# Patient Record
Sex: MALE | Race: WHITE | Employment: PART TIME | URBAN - METROPOLITAN AREA
[De-identification: names, ages, dates, MRNs, and addresses within clinical notes are randomized per-mention and may not be internally consistent; named-entity substitution may affect disease eponyms.]

---

## 2017-02-03 ENCOUNTER — TRANSFERRED RECORDS (OUTPATIENT)
Dept: HEALTH INFORMATION MANAGEMENT | Facility: CLINIC | Age: 27
End: 2017-02-03

## 2017-02-13 ENCOUNTER — OFFICE VISIT (OUTPATIENT)
Dept: OTHER | Facility: OUTPATIENT CENTER | Age: 27
End: 2017-02-13

## 2017-02-13 DIAGNOSIS — F43.10 PTSD (POST-TRAUMATIC STRESS DISORDER): ICD-10-CM

## 2017-02-13 DIAGNOSIS — F64.0 GENDER DYSPHORIA IN ADOLESCENT AND ADULT: Primary | ICD-10-CM

## 2017-02-13 NOTE — MR AVS SNAPSHOT
After Visit Summary   2/13/2017    Parveen Kaplan    MRN: [id number]           Patient Information     Date Of Birth          1990        Visit Information        Provider Department      2/13/2017 6:00 PM Denny Hdez PhD  [facility]        Today's Diagnoses     Gender dysphoria in adolescent and adult    -  1    PTSD (post-traumatic stress disorder)           Follow-ups after your visit        Your next 10 appointments already scheduled     Feb 27, 2017  6:00 PM CST   INDIVIDUAL THERAPY with Denny Hdez PhD LP   [facility] ([facility])    [address]  [address]  [facility] [address]   [phone]            Mar 13, 2017  6:00 PM CDT   INDIVIDUAL THERAPY with Denny Hdez PhD LP   [facility] ([facility])    [address]  [address]  [facility] [address]   [phone]            Mar 27, 2017  6:00 PM CDT   INDIVIDUAL THERAPY with Denny Hdez PhD LP   [facility] [facility] ([facility])    [address]  [address]  [facility] [address]   [phone]            Apr 10, 2017  6:00 PM CDT   INDIVIDUAL THERAPY with Denny Hdez PhD LP   [facility] ([facility])    [address]  [address]  [facility] [address]   [phone]            Apr 24, 2017  6:00 PM CDT   INDIVIDUAL THERAPY with Denny Hdez PhD LP   [facility] ([facility])    [address]  [address]  [facility] [address]   [phone]              Who to contact     Please call your clinic at [phone] to:    Ask questions about your health    Make or cancel appointments    Discuss your medicines    Learn about your test results    Speak to your doctor   If you have compliments or concerns about an experience at your clinic, or if you wish to file a complaint, please contact [facility] Patient  Relations at [phone] or email us at [email or web address]         Additional Information About Your Visit        [email or web address] Information     HerBabyShower gives you secure access to your electronic health record. If you see a primary care provider, you can also send messages to your care team and make appointments. If you have questions, please call your primary care clinic.  If you do not have a primary care provider, please call [phone] and they will assist you.      HerBabyShower is an electronic gateway that provides easy, online access to your medical records. With HerBabyShower, you can request a clinic appointment, read your test results, renew a prescription or communicate with your care team.     To access your existing account, please contact your [facility] or call [phone] for assistance.        Care EveryWhere ID     This is your Care EveryWhere ID. This could be used by other organizations to access your [place] medical records  [id number]         Blood Pressure from Last 3 Encounters:   12/06/16 110/60   11/30/16 121/60   11/29/16 124/73    Weight from Last 3 Encounters:   12/06/16 85.4 kg (188 lb 3.2 oz)   11/30/16 85.7 kg (188 lb 15 oz)   11/29/16 85.5 kg (188 lb 9.6 oz)              We Performed the Following     Individual Psychotherapy (53+ min) [65654]          Today's Medication Changes          These changes are accurate as of: 2/13/17  6:56 PM.  If you have any questions, ask your nurse or doctor.               These medicines have changed or have updated prescriptions.        Dose/Directions    gabapentin 300 MG capsule   Commonly known as:  NEURONTIN   This may have changed:    - when to take this  - additional instructions   Used for:  Depression with anxiety        One tid   Quantity:  90 capsule   Refills:  1       venlafaxine 225 MG Tb24 24 hr tablet   Commonly known as:  EFFEXOR-ER   This may have changed:    - how much to take  - additional  "instructions   Used for:  Depression with anxiety, Panic attacks        One q day   Quantity:  30 tablet   Refills:  1                Primary Care Provider    Md Other Clinic                Thank you!     Thank you for choosing [facility]  for your care. Our goal is always to provide you with excellent care. Hearing back from our patients is one way we can continue to improve our services. Please take a few minutes to complete the written survey that you may receive in the mail after your visit with us. Thank you!             Your Updated Medication List - Protect others around you: Learn how to safely use, store and throw away your medicines at www.disposemymeds.org.          This list is accurate as of: 2/13/17  6:56 PM.  Always use your most recent med list.                   Brand Name Dispense Instructions for use    BUSPIRONE HCL PO      Take 15 mg by mouth 3 times daily       gabapentin 300 MG capsule    NEURONTIN    90 capsule    One tid       hydrOXYzine 25 MG capsule    VISTARIL    30 capsule    Take 1 capsule (25 mg) by mouth every 6 hours as needed for itching       lamoTRIgine 50 MG half-tab    LaMICtal     Take 25 mg by mouth 2 times daily       LORazepam 1 MG tablet    ATIVAN    60 tablet    Take 0.5 tablets (0.5 mg) by mouth every 8 hours as needed for anxiety       MIRTAZAPINE PO      Take 25 mg by mouth At Bedtime       ondansetron 4 MG ODT tab    ZOFRAN-ODT    8 tablet    Take 1 tablet (4 mg) by mouth every 6 hours as needed for nausea       oxyCODONE 5 MG IR tablet    ROXICODONE    40 tablet    Take 1-2 tablets (5-10 mg) by mouth every 3 hours as needed for other (Moderate to Severe Pain)       senna-docusate 8.6-50 MG per tablet    SENOKOT-S;PERICOLACE    30 tablet    Take 1-2 tablets by mouth 2 times daily       syringe/needle (disp) 25G X 1\" 1 ML Misc     10 each    To use with weekly IM injection       testosterone cypionate 200 MG/ML injection    DEPO-TESTOSTERONE    2 mL    " Inject 0.25 mLs (50 mg) into the muscle once a week       venlafaxine 225 MG Tb24 24 hr tablet    EFFEXOR-ER    30 tablet    One q day       WELLBUTRIN XL PO      Take 150 mg by mouth
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
## 2017-02-27 ENCOUNTER — OFFICE VISIT (OUTPATIENT)
Dept: OTHER | Facility: OUTPATIENT CENTER | Age: 27
End: 2017-02-27

## 2017-02-27 DIAGNOSIS — F64.0 GENDER DYSPHORIA IN ADOLESCENT AND ADULT: Primary | ICD-10-CM

## 2017-02-27 DIAGNOSIS — F43.10 PTSD (POST-TRAUMATIC STRESS DISORDER): ICD-10-CM

## 2017-02-27 NOTE — MR AVS SNAPSHOT
After Visit Summary   2/27/2017    Parveen Kaplan    MRN: [id number]           Patient Information     Date Of Birth          1990        Visit Information        Provider Department      2/27/2017 6:00 PM Denny Hdez PhD LP [facility]        Today's Diagnoses     Gender dysphoria in adolescent and adult    -  1    PTSD (post-traumatic stress disorder)           Follow-ups after your visit        Your next 10 appointments already scheduled     Mar 02, 2017  2:00 PM CST   DBT Eval with Deneen Trevino LP   Psychiatry Clinic ([facility])    [facility]  [address]  [address] [facility] [address]   [phone]            Mar 13, 2017  6:00 PM CDT   INDIVIDUAL THERAPY with Denny Hdez PhD LP   [facility] ([facility])    [address]  [address]  [facility] [address]   [phone]            Mar 27, 2017  6:00 PM CDT   INDIVIDUAL THERAPY with Denny Hdez PhD LP   [facility] ([facility])    [address]  [address]  [facility] [address]   [phone]            Apr 10, 2017  6:00 PM CDT   INDIVIDUAL THERAPY with Denny Hdez PhD LP   [facility] ([facility])    [address]  [address]  [facility] [address]   [phone]            Apr 24, 2017  6:00 PM CDT   INDIVIDUAL THERAPY with Denny Hdez PhD LP   [facility] ([facility])    [address]  [address]  [facility] [address]   [phone]            May 08, 2017  6:00 PM CDT   INDIVIDUAL THERAPY with Denny Hdez PhD LP   [facility] ([facility])    [address]  [address]  [facility] [address]   [phone]            May 22, 2017  6:00 PM CDT   INDIVIDUAL THERAPY with Denny Hdez PhD LP   [facility] ([facility])    [address]  Mail [address]  [address]  [facility] [address]   [phone]              Who to contact     Please call your clinic at [phone] to:    Ask questions about your health    Make or cancel appointments    Discuss your medicines    Learn about your test results    Speak to your doctor   If you have compliments or concerns about an experience at your clinic, or if you wish to file a complaint, please contact [facility] Patient Relations at [phone] or email us at [email or web address]         Additional Information About Your Visit        beprettyhart Information     POPVOXt gives you secure access to your electronic health record. If you see a primary care provider, you can also send messages to your care team and make appointments. If you have questions, please call your primary care clinic.  If you do not have a primary care provider, please call [phone] and they will assist you.      [employer] is an electronic gateway that provides easy, online access to your medical records. With [employer], you can request a clinic appointment, read your test results, renew a prescription or communicate with your care team.     To access your existing account, please contact your [facility] or call [phone] for assistance.        Care EveryWhere ID     This is your Care EveryWhere ID. This could be used by other organizations to access your [place] medical records  [id number]         Blood Pressure from Last 3 Encounters:   12/06/16 110/60   11/30/16 121/60   11/29/16 124/73    Weight from Last 3 Encounters:   12/06/16 85.4 kg (188 lb 3.2 oz)   11/30/16 85.7 kg (188 lb 15 oz)   11/29/16 85.5 kg (188 lb 9.6 oz)              We Performed the Following     Individual Psychotherapy (53+ min) [55801]          Today's Medication Changes          These changes are accurate as of: 2/27/17  6:55 PM.  If you have any questions, ask your nurse or doctor.               These medicines  have changed or have updated prescriptions.        Dose/Directions    gabapentin 300 MG capsule   Commonly known as:  NEURONTIN   This may have changed:    - when to take this  - additional instructions   Used for:  Depression with anxiety        One tid   Quantity:  90 capsule   Refills:  1       venlafaxine 225 MG Tb24 24 hr tablet   Commonly known as:  EFFEXOR-ER   This may have changed:    - how much to take  - additional instructions   Used for:  Depression with anxiety, Panic attacks        One q day   Quantity:  30 tablet   Refills:  1                Primary Care Provider    Md Other Clinic                Thank you!     Thank you for choosing [facility]  for your care. Our goal is always to provide you with excellent care. Hearing back from our patients is one way we can continue to improve our services. Please take a few minutes to complete the written survey that you may receive in the mail after your visit with us. Thank you!             Your Updated Medication List - Protect others around you: Learn how to safely use, store and throw away your medicines at www.disposemymeds.org.          This list is accurate as of: 2/27/17  6:55 PM.  Always use your most recent med list.                   Brand Name Dispense Instructions for use    BUSPIRONE HCL PO      Take 15 mg by mouth 3 times daily       gabapentin 300 MG capsule    NEURONTIN    90 capsule    One tid       hydrOXYzine 25 MG capsule    VISTARIL    30 capsule    Take 1 capsule (25 mg) by mouth every 6 hours as needed for itching       lamoTRIgine 50 MG half-tab    LaMICtal     Take 25 mg by mouth 2 times daily       LORazepam 1 MG tablet    ATIVAN    60 tablet    Take 0.5 tablets (0.5 mg) by mouth every 8 hours as needed for anxiety       MIRTAZAPINE PO      Take 25 mg by mouth At Bedtime       ondansetron 4 MG ODT tab    ZOFRAN-ODT    8 tablet    Take 1 tablet (4 mg) by mouth every 6 hours as needed for nausea       oxyCODONE 5 MG IR  "tablet    ROXICODONE    40 tablet    Take 1-2 tablets (5-10 mg) by mouth every 3 hours as needed for other (Moderate to Severe Pain)       senna-docusate 8.6-50 MG per tablet    SENOKOT-S;PERICOLACE    30 tablet    Take 1-2 tablets by mouth 2 times daily       syringe/needle (disp) 25G X 1\" 1 ML Misc     10 each    To use with weekly IM injection       testosterone cypionate 200 MG/ML injection    DEPO-TESTOSTERONE    2 mL    Inject 0.25 mLs (50 mg) into the muscle once a week       venlafaxine 225 MG Tb24 24 hr tablet    EFFEXOR-ER    30 tablet    One q day       WELLBUTRIN XL PO      Take 150 mg by mouth         "
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
## 2017-03-08 ENCOUNTER — TEAM CONFERENCE (OUTPATIENT)
Dept: OTHER | Facility: OUTPATIENT CENTER | Age: 27
End: 2017-03-08

## 2017-03-08 NOTE — TELEPHONE ENCOUNTER
Medical/Psychiatric/Psyhological Consultation Form    Date:  3/8/2017     Name:  Parveen Kaplan   Aliases:  NÉSTOR KAPLAN : MILES : RAE BERNAL  :  1990  MRN:  [id number]    To:  Dr. [facility]  Other Providers:  none    Reason for Consult:  Psychiatric Consultation:  Evaluation of indications for medication treatment    DSM Diagnosis:  302.85    Consult reason / list test to be given/interpretted and additional information:  Client is FTM individual who is a long-time therapy client, though less frequent in the last 6 months. He has PTSD and anxiety/depression. He find it difficult to work with his current psychiatry and I suggested he consider [facility] psychiatry. He already has an appt with [facility] psychiatry set up.    OK for Medical Student to sit in:  unknown

## 2017-03-13 ENCOUNTER — OFFICE VISIT (OUTPATIENT)
Dept: OTHER | Facility: OUTPATIENT CENTER | Age: 27
End: 2017-03-13

## 2017-03-13 DIAGNOSIS — F43.10 PTSD (POST-TRAUMATIC STRESS DISORDER): ICD-10-CM

## 2017-03-13 DIAGNOSIS — F64.0 GENDER DYSPHORIA IN ADOLESCENT AND ADULT: Primary | ICD-10-CM
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
## 2017-03-13 NOTE — MR AVS SNAPSHOT
After Visit Summary   3/13/2017    Parveen Kaplan    MRN: [id number]           Patient Information     Date Of Birth          1990        Visit Information        Provider Department      3/13/2017 6:00 PM Denny Hdez PhD LP [facility]        Today's Diagnoses     Gender dysphoria in adolescent and adult    -  1    PTSD (post-traumatic stress disorder)           Follow-ups after your visit        Your next 10 appointments already scheduled     Mar 27, 2017  6:00 PM CDT   INDIVIDUAL THERAPY with Denny Hdez PhD LP   [facility] ([facility])    [address]  [address] 7521  [facility] [address]   [phone]            Apr 10, 2017  6:00 PM CDT   INDIVIDUAL THERAPY with Denny Hdez PhD LP   [facility] ([facility])    [address]  [address] 7521  [facility] [address]   [phone]            Apr 24, 2017  6:00 PM CDT   INDIVIDUAL THERAPY with Denny Hdez PhD LP   [facility] ([facility])    [address]  [address] 7521  [facility] [address]   [phone]            May 08, 2017  6:00 PM CDT   INDIVIDUAL THERAPY with Denny Hdez PhD LP   [facility] ([facility])    [address]  Mail [address] 7521  [facility] [address]   [phone]            May 22, 2017  6:00 PM CDT   INDIVIDUAL THERAPY with Denny Hdez PhD LP   [facility] ([facility])    [address]  [address]  [facility] [address]   [phone]              Who to contact     Please call your clinic at [phone] to:    Ask questions about your health    Make or cancel appointments    Discuss your medicines    Learn about your test results    Speak to your doctor   If you have compliments or concerns about an experience at your clinic, or if you wish to file a complaint, please contact [facility] Patient  Relations at [phone] or email us at [email or web address]         Additional Information About Your Visit        [employer]vision System[employer] Information     Tachyus gives you secure access to your electronic health record. If you see a primary care provider, you can also send messages to your care team and make appointments. If you have questions, please call your primary care clinic.  If you do not have a primary care provider, please call [phone] and they will assist you.      Tachyus is an electronic gateway that provides easy, online access to your medical records. With Tachyus, you can request a clinic appointment, read your test results, renew a prescription or communicate with your care team.     To access your existing account, please contact your [facility] or call [phone] for assistance.        Care EveryWhere ID     This is your Care EveryWhere ID. This could be used by other organizations to access your [place] medical records  [id number]         Blood Pressure from Last 3 Encounters:   12/06/16 110/60   11/30/16 121/60   11/29/16 124/73    Weight from Last 3 Encounters:   12/06/16 85.4 kg (188 lb 3.2 oz)   11/30/16 85.7 kg (188 lb 15 oz)   11/29/16 85.5 kg (188 lb 9.6 oz)              We Performed the Following     Individual Psychotherapy (53+ min) [79239]          Today's Medication Changes          These changes are accurate as of: 3/13/17  6:55 PM.  If you have any questions, ask your nurse or doctor.               These medicines have changed or have updated prescriptions.        Dose/Directions    gabapentin 300 MG capsule   Commonly known as:  NEURONTIN   This may have changed:    - when to take this  - additional instructions   Used for:  Depression with anxiety        One tid   Quantity:  90 capsule   Refills:  1       venlafaxine 225 MG Tb24 24 hr tablet   Commonly known as:  EFFEXOR-ER   This may have changed:    - how much to take  - additional  "instructions   Used for:  Depression with anxiety, Panic attacks        One q day   Quantity:  30 tablet   Refills:  1                Primary Care Provider Fax #    M[facility]ed with [facility] [phone]       [employer] [place] [address] [facility] [address]        Thank you!     Thank you for choosing [facility]  for your care. Our goal is always to provide you with excellent care. Hearing back from our patients is one way we can continue to improve our services. Please take a few minutes to complete the written survey that you may receive in the mail after your visit with us. Thank you!             Your Updated Medication List - Protect others around you: Learn how to safely use, store and throw away your medicines at www.disposemymeds.org.          This list is accurate as of: 3/13/17  6:55 PM.  Always use your most recent med list.                   Brand Name Dispense Instructions for use    BUSPIRONE HCL PO      Take 15 mg by mouth 3 times daily       gabapentin 300 MG capsule    NEURONTIN    90 capsule    One tid       hydrOXYzine 25 MG capsule    VISTARIL    30 capsule    Take 1 capsule (25 mg) by mouth every 6 hours as needed for itching       lamoTRIgine 50 MG half-tab    LaMICtal     Take 25 mg by mouth 2 times daily       LORazepam 1 MG tablet    ATIVAN    60 tablet    Take 0.5 tablets (0.5 mg) by mouth every 8 hours as needed for anxiety       MIRTAZAPINE PO      Take 25 mg by mouth At Bedtime       ondansetron 4 MG ODT tab    ZOFRAN-ODT    8 tablet    Take 1 tablet (4 mg) by mouth every 6 hours as needed for nausea       oxyCODONE 5 MG IR tablet    ROXICODONE    40 tablet    Take 1-2 tablets (5-10 mg) by mouth every 3 hours as needed for other (Moderate to Severe Pain)       senna-docusate 8.6-50 MG per tablet    SENOKOT-S;PERICOLACE    30 tablet    Take 1-2 tablets by mouth 2 times daily       syringe/needle (disp) 25G X 1\" 1 ML Misc     10 each    To use with weekly IM injection       " testosterone cypionate 200 MG/ML injection    DEPO-TESTOSTERONE    2 mL    Inject 0.25 mLs (50 mg) into the muscle once a week       venlafaxine 225 MG Tb24 24 hr tablet    EFFEXOR-ER    30 tablet    One q day       WELLBUTRIN XL PO      Take 150 mg by mouth

## 2017-05-26 ENCOUNTER — OFFICE VISIT (OUTPATIENT)
Dept: PSYCHIATRY | Facility: CLINIC | Age: 27
End: 2017-05-26
Attending: PSYCHIATRY & NEUROLOGY
Payer: COMMERCIAL

## 2017-05-26 VITALS
DIASTOLIC BLOOD PRESSURE: 77 MMHG | SYSTOLIC BLOOD PRESSURE: 111 MMHG | WEIGHT: 183.2 LBS | BODY MASS INDEX: 26.29 KG/M2 | HEART RATE: 79 BPM

## 2017-05-26 DIAGNOSIS — F64.0 GENDER DYSPHORIA IN ADULT: ICD-10-CM

## 2017-05-26 DIAGNOSIS — F41.1 GAD (GENERALIZED ANXIETY DISORDER): Primary | ICD-10-CM

## 2017-05-26 DIAGNOSIS — F41.8 DEPRESSION WITH ANXIETY: ICD-10-CM

## 2017-05-26 DIAGNOSIS — F33.1 MAJOR DEPRESSIVE DISORDER, RECURRENT EPISODE, MODERATE (H): ICD-10-CM

## 2017-05-26 PROCEDURE — 99212 OFFICE O/P EST SF 10 MIN: CPT | Mod: ZF

## 2017-05-26 RX ORDER — GABAPENTIN 300 MG/1
CAPSULE ORAL
Qty: 150 CAPSULE | Refills: 1 | Status: SHIPPED | OUTPATIENT
Start: 2017-05-26 | End: 2017-10-23

## 2017-05-26 RX ORDER — LAMOTRIGINE 100 MG/1
100 TABLET ORAL DAILY
Qty: 30 TABLET | Refills: 1 | Status: SHIPPED | OUTPATIENT
Start: 2017-05-26 | End: 2017-06-21

## 2017-05-26 NOTE — PATIENT INSTRUCTIONS
Stop Wellbutrin  Increase Gabapentin to 600 mg in the morning, 300 mg in the afternoon, and 600 mg at bedtime  In one week, increase Lamictal to 100 mg daily    A social work referral will be placed and they will be contacting you within 3-5 business with additional resources for DBT programs.

## 2017-05-26 NOTE — MR AVS SNAPSHOT
After Visit Summary   5/26/2017    Parveen Kaplan    MRN: [id number]           Patient Information     Date Of Birth          1990        Visit Information        Provider Department      5/26/2017 1:00 PM Sara Santacruz MD [facility]        Today's Diagnoses     Depression with anxiety          Care Instructions    Stop Wellbutrin  Increase Gabapentin to 600 mg in the morning, 300 mg in the afternoon, and 600 mg at bedtime  In one week, increase Lamictal to 100 mg daily    A social work referral will be placed and they will be contacting you within 3-5 business with additional resources for DBT programs.           Follow-ups after your visit        Follow-up notes from your care team     Return in about 1 month (around 6/26/2017).      Your next 10 appointments already scheduled     Jun 12, 2017  6:00 PM CDT   INDIVIDUAL THERAPY with Denny Hdez PhD    [facility] ([facility])    [address]  Mail [address] 7521  [facility] [address]   [phone]            Jun 21, 2017  3:15 PM CDT   Adult Med Follow UP with Sara Santacruz MD   [facility] ([facility])    [address]  [facility] [address]   [phone]            Jun 26, 2017  6:00 PM CDT   INDIVIDUAL THERAPY with Denny Hdez PhD    [facility] ([facility])    [address]  [address] 75[address]  [facility] [address]   [phone]            Jul 10, 2017  6:00 PM CDT   INDIVIDUAL THERAPY with Denny Hdez PhD LP   [place] [facility] ([facility])    [address]  Mail [address] 7521  [facility] [address]   [phone]            Jul 24, 2017  6:00 PM CDT   INDIVIDUAL THERAPY with Denny Hdez PhD [facility] ([facility])    [address]  [address] 75[address]  [facility] [address]   [phone]              Who to contact     Please call your  clinic at [phone] to:    Ask questions about your health    Make or cancel appointments    Discuss your medicines    Learn about your test results    Speak to your doctor   If you have compliments or concerns about an experience at your clinic, or if you wish to file a complaint, please contact [facility] Patient Relations at [phone] or email us at [email or web address]         Additional Information About Your Visit        Z[employer]le[employer] Information     Echo[employer]t gives you secure access to your electronic health record. If you see a primary care provider, you can also send messages to your care team and make appointments. If you have questions, please call your primary care clinic.  If you do not have a primary care provider, please call [phone] and they will assist you.      Bringme is an electronic gateway that provides easy, online access to your medical records. With Bringme, you can request a clinic appointment, read your test results, renew a prescription or communicate with your care team.     To access your existing account, please contact your [facility] or call [phone] for assistance.        Care EveryWhere ID     This is your Care EveryWhere ID. This could be used by other organizations to access your [place] medical records  [id number]        Your Vitals Were     Pulse BMI (Body Mass Index)                79 26.29 kg/m2           Blood Pressure from Last 3 Encounters:   05/26/17 111/77   12/06/16 110/60   11/30/16 121/60    Weight from Last 3 Encounters:   05/26/17 83.1 kg (183 lb 3.2 oz)   12/06/16 85.4 kg (188 lb 3.2 oz)   11/30/16 85.7 kg (188 lb 15 oz)              Today, you had the following     No orders found for display         Today's Medication Changes          These changes are accurate as of: 5/26/17  3:11 PM.  If you have any questions, ask your nurse or doctor.               These medicines have changed or  have updated prescriptions.        Dose/Directions    gabapentin 300 MG capsule   Commonly known as:  NEURONTIN   This may have changed:  additional instructions   Used for:  Depression with anxiety   Changed by:  Sara Santacruz MD        Take 600 mg in the morning, 300 mg in the afternoon, and 600 mg at bedtime for a total daily dose of 1500 mg   Quantity:  150 capsule   Refills:  1       * lamoTRIgine 50 mg half-tab   Commonly known as:  LaMICtal   This may have changed:  Another medication with the same name was added. Make sure you understand how and when to take each.   Changed by:  Senait Gaines MD        Dose:  25 mg   Take 25 mg by mouth 2 times daily   Refills:  0       * lamoTRIgine 100 MG tablet   Commonly known as:  LAMICTAL   This may have changed:  You were already taking a medication with the same name, and this prescription was added. Make sure you understand how and when to take each.   Used for:  Depression with anxiety   Changed by:  Sara Santacruz MD        Dose:  100 mg   Take 1 tablet (100 mg) by mouth daily In one week   Quantity:  30 tablet   Refills:  1       venlafaxine 225 MG Tb24 24 hr tablet   Commonly known as:  EFFEXOR-ER   This may have changed:    - how much to take  - additional instructions   Used for:  Depression with anxiety, Panic attacks        One q day   Quantity:  30 tablet   Refills:  1       * Notice:  This list has 2 medication(s) that are the same as other medications prescribed for you. Read the directions carefully, and ask your doctor or other care provider to review them with you.      Stop taking these medicines if you haven't already. Please contact your care team if you have questions.     LORazepam 1 MG tablet   Commonly known as:  ATIVAN   Stopped by:  Sara Santacruz MD           MIRTAZAPINE PO   Stopped by:  Sara Santacruz MD           ondansetron 4 MG ODT tab   Commonly known as:  ZOFRAN-ODT   Stopped by:  Sara Santacruz MD            oxyCODONE 5 MG IR tablet   Commonly known as:  ROXICODONE   Stopped by:  Sara Santacruz MD           senna-docusate 8.6-50 MG per tablet   Commonly known as:  SENOKOT-S;PERICOLACE   Stopped by:  Sara Santacruz MD           WELLBUTRIN XL PO   Stopped by:  Sara Santacruz MD                Where to get your medicines      These medications were sent to [facility] [address] [place], [place] [address] [address]  AT [facility] [address] [facility]  [address], [facility] [address]     Phone:  [phone]     gabapentin 300 MG capsule    lamoTRIgine 100 MG tablet                Primary Care Provider Fax #    [facility] [phone]       [facility] [place] [address] [facility] [address]        Thank you!     Thank you for choosing [facility]  for your care. Our goal is always to provide you with excellent care. Hearing back from our patients is one way we can continue to improve our services. Please take a few minutes to complete the written survey that you may receive in the mail after your visit with us. Thank you!             Your Updated Medication List - Protect others around you: Learn how to safely use, store and throw away your medicines at www.disposemymeds.org.          This list is accurate as of: 5/26/17  3:11 PM.  Always use your most recent med list.                   Brand Name Dispense Instructions for use    BUSPIRONE HCL PO      Take 15 mg by mouth 3 times daily       gabapentin 300 MG capsule    NEURONTIN    150 capsule    Take 600 mg in the morning, 300 mg in the afternoon, and 600 mg at bedtime for a total daily dose of 1500 mg       hydrOXYzine 25 MG capsule    VISTARIL    30 capsule    Take 1 capsule (25 mg) by mouth every 6 hours as needed for itching       * lamoTRIgine 50 mg half-tab    LaMICtal     Take 25 mg by mouth 2 times daily       * lamoTRIgine 100 MG tablet    LAMICTAL    30 tablet    Take 1 tablet (100 mg) by mouth daily In one week        "syringe/needle (disp) 25G X 1\" 1 ML Misc     10 each    To use with weekly IM injection       testosterone cypionate 200 MG/ML injection    DEPO-TESTOSTERONE    2 mL    Inject 0.25 mLs (50 mg) into the muscle once a week       venlafaxine 225 MG Tb24 24 hr tablet    EFFEXOR-ER    30 tablet    One q day       * Notice:  This list has 2 medication(s) that are the same as other medications prescribed for you. Read the directions carefully, and ask your doctor or other care provider to review them with you.      "
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
## 2017-05-26 NOTE — NURSING NOTE
Chief Complaint   Patient presents with     Eval/Assessment     Reviewed allergies, smoking status, and pharmacy preference  Administered abuse screening questions   Obtained weight, blood pressure and heart rate   Silvia Dumas LPN

## 2017-05-27 ENCOUNTER — HEALTH MAINTENANCE LETTER (OUTPATIENT)
Age: 27
End: 2017-05-27

## 2017-06-02 ENCOUNTER — TELEPHONE (OUTPATIENT)
Dept: PSYCHIATRY | Facility: CLINIC | Age: 27
End: 2017-06-02

## 2017-06-02 ASSESSMENT — PATIENT HEALTH QUESTIONNAIRE - PHQ9: SUM OF ALL RESPONSES TO PHQ QUESTIONS 1-9: 13

## 2017-06-02 NOTE — TELEPHONE ENCOUNTER
"Social Work Referral Response  [facility]    Data/Intervention:  Patient Name:  Parveen Kaplan  /Age:  1990 (27 year old)    Referral Source:  Dr. Sara Santacruz, Psychiatry provider  Reason for Referral:  Per Dr. Santacruz:    \"Social Work Consult Request     Reason for Referral and/or Desired Outcome:     Pt interested in DBT. I would love for him (OMAR) to attend a DBT program that [facility] has recommended, if possible.     Is this a provider recommendation or patient request?   Both     How have they been getting this need met or coping until this point?   Individual psychotherapy though would benefit from more intensive outpatient services     Have there been changes recently that contribute to this need?   Ongoing anxiety and depressive symptoms and lack of healthy coping strategies     Does this person have a  or other  involved in their care currently?  If yes, is there an ADILSON? No       Additional Comments or History:  (Are there specific dates of service SW can review that will provide helpful context)   None     Preferred Mode of Communication: Contact with patient   - Is this individual aware of this consult?  Yes   - If the individual is someone other than the patient, is there a Release of Information/ADILSON?  NA     If contacting patient by phone, is it okay to leave a detailed message?   Did not ask this question specifically     Preferred Response Time:  2-3 business days   (Note: standard response time is 2-3 business days)\"    Collaborated With:    -Omar, pt/self  -Dr. Santacruz    SW called Omar and [facility] to offer DBT referrals. No programs in [place], but some in surrounding areas. Also offered to send whole list as [employer] message.     Plan:  Provided patient with writer's contact information and availability.   SW will wait for call back from pt.     Will route to patient's psychiatric provider(s) as an FYI.     Thank you for the " referral!  Please call or page if needs or concerns arise.     ROSARIO Rodriguez, [facility]  Clinic   Direct: [phone]  Fax: [phone]

## 2017-06-21 ENCOUNTER — OFFICE VISIT (OUTPATIENT)
Dept: PSYCHIATRY | Facility: CLINIC | Age: 27
End: 2017-06-21
Attending: PSYCHIATRY & NEUROLOGY

## 2017-06-21 VITALS
SYSTOLIC BLOOD PRESSURE: 117 MMHG | DIASTOLIC BLOOD PRESSURE: 75 MMHG | BODY MASS INDEX: 26.57 KG/M2 | HEART RATE: 92 BPM | WEIGHT: 185.2 LBS

## 2017-06-21 DIAGNOSIS — F41.8 DEPRESSION WITH ANXIETY: ICD-10-CM

## 2017-06-21 DIAGNOSIS — F41.0 PANIC ATTACKS: ICD-10-CM

## 2017-06-21 DIAGNOSIS — F33.41 MDD (MAJOR DEPRESSIVE DISORDER), RECURRENT, IN PARTIAL REMISSION (H): Primary | ICD-10-CM

## 2017-06-21 DIAGNOSIS — F41.1 GAD (GENERALIZED ANXIETY DISORDER): ICD-10-CM

## 2017-06-21 DIAGNOSIS — Z92.89 HISTORY OF PSYCHIATRIC CARE: ICD-10-CM

## 2017-06-21 PROCEDURE — 99212 OFFICE O/P EST SF 10 MIN: CPT | Mod: ZF

## 2017-06-21 RX ORDER — VENLAFAXINE HYDROCHLORIDE 150 MG/1
300 TABLET, EXTENDED RELEASE ORAL
Start: 2017-06-21 | End: 2017-10-23

## 2017-06-21 RX ORDER — LAMOTRIGINE 100 MG/1
150 TABLET ORAL DAILY
Qty: 45 TABLET | Refills: 2
Start: 2017-06-21 | End: 2017-10-23
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
## 2017-06-21 NOTE — MR AVS SNAPSHOT
After Visit Summary   6/21/2017    Parveen Kaplan    MRN: [id number]           Patient Information     Date Of Birth          1990        Visit Information        Provider Department      6/21/2017 3:15 PM Sara Santacruz MD Psychiatry Clinic        Today's Diagnoses     Depression with anxiety        Panic attacks          Care Instructions    Please contact Con[facility] (contact info below) to discuss DBT program options.    ROSARIO Rodriguez, [facility]   Direct: [phone]  GENERAL RECOMMENDATIONS FOR INSOMNIA       --Allow 2-4 wks to see results--    - Try progressive muscle relaxation and paced breathing during night-time awakenings.     Establish a regular sleep schedule: Go to bed at same time; Get up at same time each day     Avoid sleeping-in on Sunday morning     Cut down time in bed (if not asleep, get up)    Use your bed only for sleep and sex; Do not read or watch television in bed     Make the bedroom comfortable:  keep it quiet, cool, dark    Consider ear plugs (silicon)     Deal with your worries before bedtime:  set aside a worry time for 30 minutes earlier    Perform measures to make you tired at bedtime:               --Get regular Exercise each day (6 hours before bedtime)                --Take medications only as directed                --Eat a light bedtime snack or warm drink               --Warm milk or camomile tea (non-caffeinated)   Things to avoid   Do not exercise 2-3 hours before bedtime   No overstimulating activities just before bed   No competitive games before bedtime   No exciting television programs before bedtime   Avoid caffeine (coffee, soda, caffeinated teas) after lunchtime   Do not use alcohol to induce sleep (worsens middle evening insomnia)   Do not take someone else's sleeping pills   Do not look at the clock when awakening   Do not turn on light when getting up to use bathroom     Thank you for coming to the PSYCHIATRY CLINIC.    Lab  Testing:  If you had lab testing today and your results are reassuring or normal they will be mailed to you or sent through [employer] within 7 days.   If the lab tests need quick action we will call you with the results.  The phone number we will call with results is # [phone] (home) . If this is not the best number please call our clinic and change the number.    Medication Refills:  If you need any refills please call your pharmacy and they will contact us. Our fax number for refills is [phone]. Please allow three business for refill processing.   If you need to  your refill at a new pharmacy, please contact the new pharmacy directly. The new pharmacy will help you get your medications transferred.     Scheduling:  If you have any concerns about today's visit or wish to schedule another appointment please call our office during normal business hours [phone] (8-5:00 M-F)    Contact Us:  Please call [phone] during business hours (8-5:00 M-F).  If after clinic hours, or on the weekend, please call  [phone].    Financial Assistance [phone]  [facility] Billing [phone]  [place] [facility]illing [phone]  Medical Records [phone]      MENTAL HEALTH CRISIS NUMBERS:  [facility]:   [facility] - [phone]   Crisis Residence [facility] - [phone]   Walk-In Counseling [facility] - [phone]   COPE 24/7 Tra Mobile Team for Adults - [[phone]]; Child - [[phone]]     Crisis Connection - [phone]     [facility]:   [facility] - [phone]   Walk-in counseling [facility] - [phone]   Walk-in counseling [facility] - [phone]   Crisis Residence [facility] - [phone]   Urgent Care Adult Mental Health:   --Drop-in, 24/7 crisis line, [facility] Mobile Team [[phone]]    CRISIS TEXT LINE: Text [phone] from anywhere, anytime, any crisis  24/7;    OR SEE www.crisistextline.org     Poison Control Center - [phone]    CHILD: [facility] needs assessment team - [phone]     [facility] - [phone]; or [facility] - [phone]    If you have a medical emergency please call 911or go to the nearest ER.                    _____________________________________________    Again thank you for choosing PSYCHIATRY CLINIC and please let us know how we can best partner with you to improve you and your family's health.  You may be receiving a survey in the mail regarding this appointment. We would love to have your feedback, both positive and negative, so please fill out the survey and return it using the provided envolpe. The survey is done by an external company, so your answers are anonymous.           Follow-ups after your visit        Your next 10 appointments already scheduled     Jul 24, 2017  1:45 PM CDT   Adult Med Follow UP with Nica Peters MD   Psychiatry Clinic ([facility])    [place] [id number] [facility] [address] [phone]              Who to contact     Please call your clinic at [phone] to:    Ask questions about your health    Make or cancel appointments    Discuss your medicines    Learn about your test results    Speak to your doctor   If you have compliments or concerns about an experience at your clinic, or if you wish to file a complaint, please contact [facility] Patient Relations at [phone] or email us at [email or web address]         Additional Information About Your Visit        MyChart Information     [employer] gives you secure access to your electronic health record. If you see a primary care provider, you can also send messages to your care team and make appointments. If you have questions, please call your primary care clinic.  If you do not have a primary care provider, please call [phone]  and they will assist you.      [employer] is an electronic gateway that provides easy, online access to your medical records. With [employer], you can request a clinic appointment, read your test results, renew a prescription or communicate with your care team.     To access your existing account, please contact your [facility] or call [phone] for assistance.        Care EveryWhere ID     This is your Care EveryWhere ID. This could be used by other organizations to access your [facility] medical records  [id number]        Your Vitals Were     Pulse BMI (Body Mass Index)                92 26.57 kg/m2           Blood Pressure from Last 3 Encounters:   06/21/17 117/75   05/26/17 111/77   12/06/16 110/60    Weight from Last 3 Encounters:   06/21/17 84 kg (185 lb 3.2 oz)   05/26/17 83.1 kg (183 lb 3.2 oz)   12/06/16 85.4 kg (188 lb 3.2 oz)              Today, you had the following     No orders found for display         Today's Medication Changes          These changes are accurate as of: 6/21/17  4:22 PM.  If you have any questions, ask your nurse or doctor.               These medicines have changed or have updated prescriptions.        Dose/Directions    lamoTRIgine 100 MG tablet   Commonly known as:  LAMICTAL   This may have changed:    - how much to take  - additional instructions  - Another medication with the same name was removed. Continue taking this medication, and follow the directions you see here.   Used for:  Depression with anxiety   Changed by:  Sara Santacruz MD        Dose:  150 mg   Take 1.5 tablets (150 mg) by mouth daily   Quantity:  45 tablet   Refills:  2       venlafaxine 150 MG Tb24 24 hr tablet   Commonly known as:  EFFEXOR-ER   This may have changed:    - medication strength  - how much to take  - how to take this  - when to take this   Used for:  Depression with anxiety, Panic attacks   Changed by:  Sara Santacruz MD        Dose:  300 mg   Take 2 tablets (300  mg) by mouth daily (with breakfast) One q day   Refills:  0         Stop taking these medicines if you haven't already. Please contact your care team if you have questions.     BUSPIRONE HCL PO   Stopped by:  Sara Santacruz MD                Where to get your medicines      Some of these will need a paper prescription and others can be bought over the counter.  Ask your nurse if you have questions.     You don't need a prescription for these medications     lamoTRIgine 100 MG tablet    venlafaxine 150 MG Tb24 24 hr tablet                Primary Care Provider Fax #    [facility] [phone]       [facility] [address]  [facility] [address]        Equal Access to Services     [facility]: Hadii radha Jones, saul navas, emmanuelle liu, kim marques. So [facility] [phone].    ATENCIÓN: Si habla español, tiene a loera disposición servicios gratuitos de asistencia lingüística. [facility] [phone].    We comply with applicable federal civil rights laws and [place] laws. We do not discriminate on the basis of race, color, national origin, age, disability sex, sexual orientation or gender identity.            Thank you!     Thank you for choosing PSYCHIATRY CLINIC  for your care. Our goal is always to provide you with excellent care. Hearing back from our patients is one way we can continue to improve our services. Please take a few minutes to complete the written survey that you may receive in the mail after your visit with us. Thank you!             Your Updated Medication List - Protect others around you: Learn how to safely use, store and throw away your medicines at www.disposemymeds.org.          This list is accurate as of: 6/21/17  4:22 PM.  Always use your most recent med list.                   Brand Name Dispense Instructions for use Diagnosis    gabapentin 300 MG capsule    NEURONTIN    150 capsule    Take 600 mg in the morning,  "300 mg in the afternoon, and 600 mg at bedtime for a total daily dose of 1500 mg    Depression with anxiety       hydrOXYzine 25 MG capsule    VISTARIL    30 capsule    Take 1 capsule (25 mg) by mouth every 6 hours as needed for itching    Gender identity disorder       lamoTRIgine 100 MG tablet    LAMICTAL    45 tablet    Take 1.5 tablets (150 mg) by mouth daily    Depression with anxiety       syringe/needle (disp) 25G X 1\" 1 ML Misc     10 each    To use with weekly IM injection    Gender identity disorder in adolescents or adults       testosterone cypionate 200 MG/ML injection    DEPO-TESTOSTERONE    2 mL    Inject 0.25 mLs (50 mg) into the muscle once a week    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

## 2017-06-21 NOTE — PATIENT INSTRUCTIONS
Please contact [facility] (contact info below) to discuss DBT program options.    ROSARIO Rodriguez, [facility]  Clinic   Direct: [phone]  GENERAL RECOMMENDATIONS FOR INSOMNIA       --Allow 2-4 wks to see results--    - Try progressive muscle relaxation and paced breathing during night-time awakenings.     Establish a regular sleep schedule: Go to bed at same time; Get up at same time each day     Avoid sleeping-in on Sunday morning     Cut down time in bed (if not asleep, get up)    Use your bed only for sleep and sex; Do not read or watch television in bed     Make the bedroom comfortable:  keep it quiet, cool, dark    Consider ear plugs (silicon)     Deal with your worries before bedtime:  set aside a worry time for 30 minutes earlier    Perform measures to make you tired at bedtime:               --Get regular Exercise each day (6 hours before bedtime)                --Take medications only as directed                --Eat a light bedtime snack or warm drink               --Warm milk or camomile tea (non-caffeinated)   Things to avoid   Do not exercise 2-3 hours before bedtime   No overstimulating activities just before bed   No competitive games before bedtime   No exciting television programs before bedtime   Avoid caffeine (coffee, soda, caffeinated teas) after lunchtime   Do not use alcohol to induce sleep (worsens middle evening insomnia)   Do not take someone else's sleeping pills   Do not look at the clock when awakening   Do not turn on light when getting up to use bathroom     Thank you for coming to the PSYCHIATRY CLINIC.    Lab Testing:  If you had lab testing today and your results are reassuring or normal they will be mailed to you or sent through [employer] within 7 days.   If the lab tests need quick action we will call you with the results.  The phone number we will call with results is # [phone] (home) . If this is not the best number please call our clinic and change the  number.    Medication Refills:  If you need any refills please call your pharmacy and they will contact us. Our fax number for refills is [phone]. Please allow three business for refill processing.   If you need to  your refill at a new pharmacy, please contact the new pharmacy directly. The new pharmacy will help you get your medications transferred.     Scheduling:  If you have any concerns about today's visit or wish to schedule another appointment please call our office during normal business hours [phone] (8-5:00 M-F)    Contact Us:  Please call [phone] during business hours (8-5:00 M-F).  If after clinic hours, or on the weekend, please call  [phone].    Financial Assistance [phone]  [facility] [phone]  [facility] [phone]  Medical Records [phone]      MENTAL HEALTH CRISIS NUMBERS:  [facility]:   [facility] - [phone]   Crisis Residence [facility] - [phone]   Walk-In Counseling [facility] [phone]   COPE 24/7 Tra Mobile Team for Adults - [[phone]]; Child - [[phone]]     Crisis Connection - [phone]     [facility] - [phone]   Walk-in counseling [facility] - [phone]   Walk-in counseling [facility] - [phone]   Crisis Residence [facility] - [phone]   Urgent Care Adult Mental Health:   --Drop-in, 24/7 crisis line, and [facility]ell Ortiz Mobile Team [[phone]]    CRISIS TEXT LINE: Text [phone] from anywhere, anytime, any crisis 24/7;    OR SEE www.crisistextline.org     Poison Control Center - [phone]    CHILD: [facility] needs assessment team - [phone]     [facility] Lifeline - [phone]; or [facility] Lifeline - [phone]    If you have a medical emergency please call 911or go to the nearest ER.                     _____________________________________________    Again thank you for choosing PSYCHIATRY CLINIC and please let us know how we can best partner with you to improve you and your family's health.  You may be receiving a survey in the mail regarding this appointment. We would love to have your feedback, both positive and negative, so please fill out the survey and return it using the provided envolpe. The survey is done by an external company, so your answers are anonymous.

## 2017-06-21 NOTE — NURSING NOTE
Chief Complaint   Patient presents with     Recheck Medication     Depression with anxiety        Reviewed allergies, smoking status, and pharmacy preference   Obtained weight, blood pressure and heart rate

## 2017-06-23 ASSESSMENT — PATIENT HEALTH QUESTIONNAIRE - PHQ9: SUM OF ALL RESPONSES TO PHQ QUESTIONS 1-9: 12

## 2017-06-27 PROBLEM — Z92.89 HISTORY OF PSYCHIATRIC CARE: Status: ACTIVE | Noted: 2017-06-27

## 2017-06-28 ENCOUNTER — TELEPHONE (OUTPATIENT)
Dept: PSYCHIATRY | Facility: CLINIC | Age: 27
End: 2017-06-28

## 2017-06-28 NOTE — TELEPHONE ENCOUNTER
"Document found in former [facility] employees file folder.  File folder reads \"faxed forms\". Post it reads \"enter in computer\"\"faxed 6/12/2017\".  On 5/26/2017 the patient signed an ADILSON authorizing the release of records from [facility] to [facility].  I don't see evidence of the ADILSON having been sent to scanning so I am sending it and keeping a copy in Psychiatry until scanning complete/confirmed.Maru Johnson/KISHOR    "

## 2017-09-27 ENCOUNTER — TRANSFERRED RECORDS (OUTPATIENT)
Dept: HEALTH INFORMATION MANAGEMENT | Facility: CLINIC | Age: 27
End: 2017-09-27

## 2017-10-02 ENCOUNTER — OFFICE VISIT (OUTPATIENT)
Dept: OTHER | Facility: OUTPATIENT CENTER | Age: 27
End: 2017-10-02

## 2017-10-02 DIAGNOSIS — F43.10 PTSD (POST-TRAUMATIC STRESS DISORDER): ICD-10-CM

## 2017-10-02 DIAGNOSIS — F64.0 GENDER DYSPHORIA IN ADULT: Primary | ICD-10-CM

## 2017-10-02 NOTE — MR AVS SNAPSHOT
After Visit Summary   10/2/2017    Parveen Kaplan    MRN: [id number]           Patient Information     Date Of Birth          1990        Visit Information        Provider Department      10/2/2017 1:00 PM Denny Hdez, PhD  [facility]        Today's Diagnoses     Gender dysphoria in adult    -  1    PTSD (post-traumatic stress disorder)           Follow-ups after your visit        Your next 10 appointments already scheduled     Oct 23, 2017  9:05 AM CDT   Adult Med Follow UP with Nica Peters MD   Psychiatry Clinic ([facility])    [address]  [address] [facility] [id number] [phone]              Who to contact     Please call your clinic at [phone] to:    Ask questions about your health    Make or cancel appointments    Discuss your medicines    Learn about your test results    Speak to your doctor   If you have compliments or concerns about an experience at your clinic, or if you wish to file a complaint, please contact [facility] Patient Relations at [phone] or email us at [email or web address]         Additional Information About Your Visit        MyChart Information     [facility] gives you secure access to your electronic health record. If you see a primary care provider, you can also send messages to your care team and make appointments. If you have questions, please call your primary care clinic.  If you do not have a primary care provider, please call [phone] and they will assist you.      [facility] is an electronic gateway that provides easy, online access to your medical records. With [facility], you can request a clinic appointment, read your test results, renew a prescription or communicate with your care team.     To access your existing account, please contact your [facility] Clinic or call [phone] for assistance.        Care EveryWhere  ID     This is your Care EveryWhere ID. This could be used by other organizations to access your [facility] medical records  [id number]         Blood Pressure from Last 3 Encounters:   06/21/17 117/75   05/26/17 111/77   12/06/16 110/60    Weight from Last 3 Encounters:   06/21/17 84 kg (185 lb 3.2 oz)   05/26/17 83.1 kg (183 lb 3.2 oz)   12/06/16 85.4 kg (188 lb 3.2 oz)              We Performed the Following     Individual Psychotherapy (53+ min) [60542]        Primary Care Provider Fax #    [facility] [phone]       [facility] [address]  [facility] [address]        Equal Access to Services     COLUMBA TONY : Janis Jones, watrevorda luqadaha, qaybta kaalmada adechristianyakomal, kim marques. So [facility] [phone].    ATENCIÓN: Si habla español, tiene a loera disposición servicios gratuitos de asistencia lingüística. [facility] [phone].    We comply with applicable federal civil rights laws and [place] laws. We do not discriminate on the basis of race, color, national origin, age, disability, sex, sexual orientation, or gender identity.            Thank you!     Thank you for choosing [facility]  for your care. Our goal is always to provide you with excellent care. Hearing back from our patients is one way we can continue to improve our services. Please take a few minutes to complete the written survey that you may receive in the mail after your visit with us. Thank you!             Your Updated Medication List - Protect others around you: Learn how to safely use, store and throw away your medicines at www.disposemymeds.org.          This list is accurate as of: 10/2/17  1:56 PM.  Always use your most recent med list.                   Brand Name Dispense Instructions for use Diagnosis    gabapentin 300 MG capsule    NEURONTIN    150 capsule    Take 600 mg in the morning, 300 mg in the afternoon, and 600 mg at bedtime for a total daily dose  "of 1500 mg    Depression with anxiety       hydrOXYzine 25 MG capsule    VISTARIL    30 capsule    Take 1 capsule (25 mg) by mouth every 6 hours as needed for itching    Gender identity disorder       lamoTRIgine 100 MG tablet    LAMICTAL    45 tablet    Take 1.5 tablets (150 mg) by mouth daily    Depression with anxiety       syringe/needle (disp) 25G X 1\" 1 ML Misc     10 each    To use with weekly IM injection    Gender identity disorder in adolescents or adults       testosterone cypionate 200 MG/ML injection    DEPO-TESTOSTERONE    2 mL    Inject 0.25 mLs (50 mg) into the muscle once a week    

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
## 2017-10-23 ENCOUNTER — OFFICE VISIT (OUTPATIENT)
Dept: PSYCHIATRY | Facility: CLINIC | Age: 27
End: 2017-10-23
Attending: PSYCHIATRY & NEUROLOGY
Payer: COMMERCIAL

## 2017-10-23 VITALS
BODY MASS INDEX: 26.34 KG/M2 | DIASTOLIC BLOOD PRESSURE: 78 MMHG | WEIGHT: 183.6 LBS | HEART RATE: 79 BPM | SYSTOLIC BLOOD PRESSURE: 110 MMHG

## 2017-10-23 DIAGNOSIS — F41.8 DEPRESSION WITH ANXIETY: ICD-10-CM

## 2017-10-23 DIAGNOSIS — F64.9 GENDER IDENTITY DISORDER: ICD-10-CM

## 2017-10-23 DIAGNOSIS — F41.0 PANIC ATTACKS: ICD-10-CM

## 2017-10-23 PROCEDURE — 99212 OFFICE O/P EST SF 10 MIN: CPT | Mod: ZF

## 2017-10-23 RX ORDER — LAMOTRIGINE 100 MG/1
150 TABLET ORAL DAILY
Qty: 45 TABLET | Refills: 2 | Status: SHIPPED | OUTPATIENT
Start: 2017-10-23 | End: 2018-01-11

## 2017-10-23 RX ORDER — GABAPENTIN 300 MG/1
CAPSULE ORAL
Qty: 150 CAPSULE | Refills: 2 | Status: SHIPPED | OUTPATIENT
Start: 2017-10-23 | End: 2018-03-06

## 2017-10-23 RX ORDER — HYDROXYZINE PAMOATE 25 MG/1
25 CAPSULE ORAL EVERY 6 HOURS PRN
Qty: 30 CAPSULE | Refills: 0 | Status: SHIPPED | OUTPATIENT
Start: 2017-10-23 | End: 2019-03-18

## 2017-10-23 RX ORDER — VENLAFAXINE HYDROCHLORIDE 150 MG/1
300 TABLET, EXTENDED RELEASE ORAL
Qty: 30 EACH | Refills: 2 | Status: SHIPPED | OUTPATIENT
Start: 2017-10-23 | End: 2017-11-09

## 2017-10-23 ASSESSMENT — PATIENT HEALTH QUESTIONNAIRE - PHQ9: SUM OF ALL RESPONSES TO PHQ QUESTIONS 1-9: 16
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
## 2017-10-23 NOTE — MR AVS SNAPSHOT
After Visit Summary   10/23/2017    Parveen Kaplan    MRN: [id number]           Patient Information     Date Of Birth          1990        Visit Information        Provider Department      10/23/2017 9:05 AM Nica Peters MD Psychiatry Clinic        Today's Diagnoses     Depression with anxiety        Panic attacks        Gender identity disorder           Follow-ups after your visit        Follow-up notes from your care team     Return in about 3 months (around 1/23/2018).      Your next 10 appointments already scheduled     Nov 13, 2017  1:00 PM CST   INDIVIDUAL THERAPY with Denny Hdez PhD    [facility] ([facility])    [address]  [address]  [facility] [address]   [phone]            Nov 27, 2017  1:00 PM CST   INDIVIDUAL THERAPY with Denny Hdez PhD [facility] ([facility])    [address]  Mail [address]  [facility] [address]   [phone]            Dec 18, 2017  1:00 PM CST   INDIVIDUAL THERAPY with Denny Hdez PhD [facility] ([facility])    [address]  Mail [address]  [facility] [address]   [phone]            Jan 22, 2018  9:05 AM CST   Adult Med Follow UP with Nica Peters MD   [facility] ([facility])    [address]  [address] [facility] [address] [phone]              Who to contact     Please call your clinic at [phone] to:    Ask questions about your health    Make or cancel appointments    Discuss your medicines    Learn about your test results    Speak to your doctor   If you have compliments or concerns about an experience at your clinic, or if you wish to file a complaint, please contact [facility] Patient Relations at [phone] or email us at [email or web address]         Additional Information About Your Visit        [facility]  Information     [facility] gives you secure access to your electronic health record. If you see a primary care provider, you can also send messages to your care team and make appointments. If you have questions, please call your primary care clinic.  If you do not have a primary care provider, please call [phone] and they will assist you.      [employer] [facility] is an electronic gateway that provides easy, online access to your medical records. With [facility], you can request a clinic appointment, read your test results, renew a prescription or communicate with your care team.     To access your existing account, please contact your [facility] or call [phone] for assistance.        Care EveryWhere ID     This is your Care EveryWhere ID. This could be used by other organizations to access your [facility] medical records  [id number]        Your Vitals Were     Pulse BMI (Body Mass Index)                79 26.34 kg/m2           Blood Pressure from Last 3 Encounters:   10/23/17 110/78   06/21/17 117/75   05/26/17 111/77    Weight from Last 3 Encounters:   10/23/17 83.3 kg (183 lb 9.6 oz)   06/21/17 84 kg (185 lb 3.2 oz)   05/26/17 83.1 kg (183 lb 3.2 oz)              Today, you had the following     No orders found for display         Where to get your medicines      These medications were sent to [place] [address] [place] [address] [place]  [address] [place], [place] [address] [address]     Phone:  [phone]     gabapentin 300 MG capsule    hydrOXYzine 25 MG capsule    lamoTRIgine 100 MG tablet    venlafaxine 150 MG Tb24 24 hr tablet          Primary Care Provider Fax #    [facility] [phone]       [address] [place] [address] [address] [facility] [address]        Equal Access to Services     COLUMBA TONY : Janis Jones, saul navas, kim keenan. So [facility] [phone].    ATENCIÓN: Danielle fam  "español, tiene a loera disposición servicios gratuitos de asistencia lingüística. Leanna moreno [phone].    We comply with applicable federal civil rights laws and [place] laws. We do not discriminate on the basis of race, color, national origin, age, disability, sex, sexual orientation, or gender identity.            Thank you!     Thank you for choosing PSYCHIATRY CLINIC  for your care. Our goal is always to provide you with excellent care. Hearing back from our patients is one way we can continue to improve our services. Please take a few minutes to complete the written survey that you may receive in the mail after your visit with us. Thank you!             Your Updated Medication List - Protect others around you: Learn how to safely use, store and throw away your medicines at www.disposemymeds.org.          This list is accurate as of: 10/23/17 11:59 PM.  Always use your most recent med list.                   Brand Name Dispense Instructions for use Diagnosis    gabapentin 300 MG capsule    NEURONTIN    150 capsule    Take 600 mg in the morning, 300 mg in the afternoon, and 600 mg at bedtime for a total daily dose of 1500 mg    Depression with anxiety       hydrOXYzine 25 MG capsule    VISTARIL    30 capsule    Take 1 capsule (25 mg) by mouth every 6 hours as needed for itching    Gender identity disorder       lamoTRIgine 100 MG tablet    LAMICTAL    45 tablet    Take 1.5 tablets (150 mg) by mouth daily    Depression with anxiety       syringe/needle (disp) 25G X 1\" 1 ML Misc     10 each    To use with weekly IM injection    Gender identity disorder in adolescents or adults       testosterone cypionate 200 MG/ML injection    DEPO-TESTOSTERONE    2 mL    Inject 0.25 mLs (50 mg) into the muscle once a week    Gender identity disorder in adolescents or adults       venlafaxine 150 MG Tb24 24 hr tablet    EFFEXOR-ER    30 each    Take 2 tablets (300 mg) by mouth daily (with breakfast) One q day    Depression with " anxiety, Panic attacks

## 2017-10-30 ENCOUNTER — OFFICE VISIT (OUTPATIENT)
Dept: OTHER | Facility: OUTPATIENT CENTER | Age: 27
End: 2017-10-30

## 2017-10-30 DIAGNOSIS — F64.0 GENDER DYSPHORIA IN ADULT: Primary | ICD-10-CM
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
## 2017-10-30 NOTE — MR AVS SNAPSHOT
After Visit Summary   10/30/2017    Parveen Kaplan    MRN: [id number]           Patient Information     Date Of Birth          1990        Visit Information        Provider Department      10/30/2017 1:00 PM Denny Hdez, PhD [facility]        Today's Diagnoses     Gender dysphoria in adult    -  1       Follow-ups after your visit        Your next 10 appointments already scheduled     Nov 13, 2017  1:00 PM CST   INDIVIDUAL THERAPY with Denny Hdez PhD LP   [facility] ([facility]    [address]  Mail [address] 7521  [facility] [address]   [phone]            Nov 27, 2017  1:00 PM CST   INDIVIDUAL THERAPY with Denny Hdez PhD LP   [facility] ([facility]    [address]  Mail [address]21  [facility] [address]   [phone]            Dec 18, 2017  1:00 PM CST   INDIVIDUAL THERAPY with Denny Hdez PhD LP   [facility]    [address]  Mail [address] 7521  [facility] [address]   [phone]            Jan 22, 2018  9:05 AM CST   Adult Med Follow UP with Nica Peters MD   [facility]iat[facility] ([facility])    [address]  [address] [facility] [id number] [phone]              Who to contact     Please call your clinic at [phone] to:    Ask questions about your health    Make or cancel appointments    Discuss your medicines    Learn about your test results    Speak to your doctor   If you have compliments or concerns about an experience at your clinic, or if you wish to file a complaint, please contact [facility] Patient Relations at [phone] or email us at [email or web address]         Additional Information About Your Visit        MyChart Information     MyChart gives you secure access to your electronic health record. If you see a primary care provider, you can  also send messages to your care team and make appointments. If you have questions, please call your primary care clinic.  If you do not have a primary care provider, please call [phone] and they will assist you.      Radiojar is an electronic gateway that provides easy, online access to your medical records. With Radiojar, you can request a clinic appointment, read your test results, renew a prescription or communicate with your care team.     To access your existing account, please contact your [facility] or call [phone] for assistance.        Care EveryWhere ID     This is your Care EveryWhere ID. This could be used by other organizations to access your [facility] [place] medical records  [id number]         Blood Pressure from Last 3 Encounters:   10/23/17 110/78   06/21/17 117/75   05/26/17 111/77    Weight from Last 3 Encounters:   10/23/17 83.3 kg (183 lb 9.6 oz)   06/21/17 84 kg (185 lb 3.2 oz)   05/26/17 83.1 kg (183 lb 3.2 oz)              We Performed the Following     Individual Psychotherapy (53+ min) [94702]        Primary Care Provider Fax #    [facility] [phone]       [address] [place] [address] [facility] [address]        Equal Access to Services     COLUMBA TONY : Hadii radha diaz hadasho Somarieali, waaxda luqadaha, qaybta kaalmada adeegyada, kim marques. So [facility] [phone].    ATENCIÓN: Si habla español, tiene a loera disposición servicios gratuitos de asistencia lingüística. Llame al [phone].    We comply with applicable federal civil rights laws and [place] laws. We do not discriminate on the basis of race, color, national origin, age, disability, sex, sexual orientation, or gender identity.            Thank you!     Thank you for choosing [facility] [facility]  for your care. Our goal is always to provide you with excellent care. Hearing back from our patients is one way we can continue to improve our services.  "Please take a few minutes to complete the written survey that you may receive in the mail after your visit with us. Thank you!             Your Updated Medication List - Protect others around you: Learn how to safely use, store and throw away your medicines at www.disposemymeds.org.          This list is accurate as of: 10/30/17  1:53 PM.  Always use your most recent med list.                   Brand Name Dispense Instructions for use Diagnosis    gabapentin 300 MG capsule    NEURONTIN    150 capsule    Take 600 mg in the morning, 300 mg in the afternoon, and 600 mg at bedtime for a total daily dose of 1500 mg    Depression with anxiety       hydrOXYzine 25 MG capsule    VISTARIL    30 capsule    Take 1 capsule (25 mg) by mouth every 6 hours as needed for itching    Gender identity disorder       lamoTRIgine 100 MG tablet    LAMICTAL    45 tablet    Take 1.5 tablets (150 mg) by mouth daily    Depression with anxiety       syringe/needle (disp) 25G X 1\" 1 ML Misc     10 each    To use with weekly IM injection    Gender identity disorder in adolescents or adults       testosterone cypionate 200 MG/ML injection    DEPO-TESTOSTERONE    2 mL    Inject 0.25 mLs (50 mg) into the muscle once a week    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

## 2017-11-03 ENCOUNTER — TRANSFERRED RECORDS (OUTPATIENT)
Dept: HEALTH INFORMATION MANAGEMENT | Facility: CLINIC | Age: 27
End: 2017-11-03

## 2017-11-08 ENCOUNTER — TELEPHONE (OUTPATIENT)
Dept: PSYCHIATRY | Facility: CLINIC | Age: 27
End: 2017-11-08

## 2017-11-08 DIAGNOSIS — F41.8 DEPRESSION WITH ANXIETY: ICD-10-CM

## 2017-11-08 DIAGNOSIS — F41.0 PANIC ATTACKS: ICD-10-CM

## 2017-11-08 NOTE — TELEPHONE ENCOUNTER
Medication requested: Venlafaxine ER 75 mg caps  Last refilled: 9-9-17 (per fax)  Qty: 60      Last seen: 10-23-17  RTC: 3 mo  Cancel: 0  No-show: 0  Next appt: 1-22-17    Refill decision: Refill pended and routed to the provider for review/determination due to dose clarification needed:    Refill Request States:   Venlafaxine ER 2 caps of 75 mg with 1 cap of 150 mg for a total of 300 mg daily    Medication List States:  venlafaxine (EFFEXOR-ER) 150 MG Take 2 tablets (300 mg) by mouth daily (with breakfast) One q day  *NOT a clear order - (should that be 1 tab daily or 2?)    Last Clinic NoteStates:  1) PSYCHOTROPIC MEDICATIONS:  - Continue Lamictal 150 mg daily  - Continue Venlafaxine  mg daily  - Continue Gabapentin 600/300/600 daily  - No refill of hydroxyzine 25-50 mg q 6 hours PRN anxiety - consider behavioral interventions in the future and tapering      Kathleen M Doege RN

## 2017-11-09 ENCOUNTER — TELEPHONE (OUTPATIENT)
Dept: PSYCHIATRY | Facility: CLINIC | Age: 27
End: 2017-11-09

## 2017-11-09 RX ORDER — VENLAFAXINE HYDROCHLORIDE 150 MG/1
300 TABLET, EXTENDED RELEASE ORAL
Qty: 30 EACH | Refills: 2 | Status: SHIPPED | OUTPATIENT
Start: 2017-11-09 | End: 2017-11-13

## 2017-11-09 NOTE — TELEPHONE ENCOUNTER
Nica Peters MD   You; Maine Rondon, RN 39 minutes ago (4:04 PM)                 Patient should be on Effexor  mg daily. Whatever pill combination gets her that dose is fine. My last note is confusing in that it says 200 mg, but he is on 300 mg.         Refill sent for 30 day supply with 2 refills to get patient to next appointment - 150 mg tabs - 2 tabs daily.    Kathleen M Doege RN

## 2017-11-09 NOTE — TELEPHONE ENCOUNTER
Received lab results from [facility] drawn on 11/3/2017 at 16:23  Results  NOT  entered into EMR   Copies sent to Dr. Peters and Maine Rondon, RNCC for review  Document placed in scanning  Maru Johnson/CMA

## 2017-11-13 ENCOUNTER — OFFICE VISIT (OUTPATIENT)
Dept: OTHER | Facility: OUTPATIENT CENTER | Age: 27
End: 2017-11-13

## 2017-11-13 DIAGNOSIS — F64.0 GENDER DYSPHORIA IN ADULT: Primary | ICD-10-CM

## 2017-11-13 RX ORDER — VENLAFAXINE HYDROCHLORIDE 150 MG/1
300 CAPSULE, EXTENDED RELEASE ORAL DAILY
Qty: 60 CAPSULE | Refills: 2 | Status: SHIPPED | OUTPATIENT
Start: 2017-11-13 | End: 2018-02-15

## 2017-11-13 NOTE — MR AVS SNAPSHOT
After Visit Summary   11/13/2017    Parveen Kaplan    MRN: [id number]           Patient Information     Date Of Birth          1990        Visit Information        Provider Department      11/13/2017 1:00 PM Denny Hdez, PhD [facility]        Today's Diagnoses     Gender dysphoria in adult    -  1       Follow-ups after your visit        Your next 10 appointments already scheduled     Nov 27, 2017  1:00 PM CST   INDIVIDUAL THERAPY with Denny Hdez PhD LP   [facility] ([facility])    [address]  Mail [address] 7521  [facility] [address]   [phone]            Dec 18, 2017  1:00 PM CST   INDIVIDUAL THERAPY with Denny Hdez PhD LP   [facility] ([facility])    [address]  Mail [address] 7521  [facility] [address]   [phone]            Jan 22, 2018  8:35 AM CST   Adult Med Follow UP with Nica Peters MD   Psychiatry Clinic ([facility])    [address] [facility] [id number] [phone]              Who to contact     Please call your clinic at [phone] to:    Ask questions about your health    Make or cancel appointments    Discuss your medicines    Learn about your test results    Speak to your doctor   If you have compliments or concerns about an experience at your clinic, or if you wish to file a complaint, please contact [facility] Patient Relations at [phone] or email us at [email or web address]         Additional Information About Your Visit        MyChart Information     Venus Conceptt gives you secure access to your electronic health record. If you see a primary care provider, you can also send messages to your care team and make appointments. If you have questions, please call your primary care clinic.  If you do not have a primary care provider, please call [phone] and they will assist  you.      OpenDNS is an electronic gateway that provides easy, online access to your medical records. With OpenDNS, you can request a clinic appointment, read your test results, renew a prescription or communicate with your care team.     To access your existing account, please contact your [facility] or call [phone] for assistance.        Care EveryWhere ID     This is your Care EveryWhere ID. This could be used by other organizations to access your [facility] medical records  [id number]         Blood Pressure from Last 3 Encounters:   10/23/17 110/78   06/21/17 117/75   05/26/17 111/77    Weight from Last 3 Encounters:   10/23/17 83.3 kg (183 lb 9.6 oz)   06/21/17 84 kg (185 lb 3.2 oz)   05/26/17 83.1 kg (183 lb 3.2 oz)              We Performed the Following     Individual Psychotherapy (53+ min) [94257]        Primary Care Provider Fax #    [facility] [phone]       [address] [place] [address] [facility] [address]        Equal Access to Services     [facility] Global Medical CenterISABEL : Hadii aad ku hadasho Soomaali, waaxda luqadaha, qaybta kaalmada adeegyakomal, kim dubois . So [facility] [phone].    ATENCIÓN: Si habla español, tiene a loera disposición servicios gratuitos de asistencia lingüística. Leanna al [phone].    We comply with applicable federal civil rights laws and [place] laws. We do not discriminate on the basis of race, color, national origin, age, disability, sex, sexual orientation, or gender identity.            Thank you!     Thank you for choosing [facility] [facility]  for your care. Our goal is always to provide you with excellent care. Hearing back from our patients is one way we can continue to improve our services. Please take a few minutes to complete the written survey that you may receive in the mail after your visit with us. Thank you!             Your Updated Medication List - Protect others around you: Learn how to  "safely use, store and throw away your medicines at www.disposemymeds.org.          This list is accurate as of: 11/13/17  1:58 PM.  Always use your most recent med list.                   Brand Name Dispense Instructions for use Diagnosis    gabapentin 300 MG capsule    NEURONTIN    150 capsule    Take 600 mg in the morning, 300 mg in the afternoon, and 600 mg at bedtime for a total daily dose of 1500 mg    Depression with anxiety       hydrOXYzine 25 MG capsule    VISTARIL    30 capsule    Take 1 capsule (25 mg) by mouth every 6 hours as needed for itching    Gender identity disorder       lamoTRIgine 100 MG tablet    LAMICTAL    45 tablet    Take 1.5 tablets (150 mg) by mouth daily    Depression with anxiety       syringe/needle (disp) 25G X 1\" 1 ML Misc     10 each    To use with weekly IM injection    Gender identity disorder in adolescents or adults       testosterone cypionate 200 MG/ML injection    DEPO-TESTOSTERONE    2 mL    Inject 0.25 mLs (50 mg) into the muscle once a week    

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
## 2017-11-13 NOTE — TELEPHONE ENCOUNTER
Rochelle Darling Michelle, RN        Phone Number: [phone]                     Caller:  mayito Nelson   Medication:  Venlafaxine   Pharmacy and location:  [facility]   Have you contacted the pharmacy: yes   How much of medication do you have left:  Ran out yesterday   Pending appt: 1/22   Okay to leave VM:  yes       LVM for pt with update that refill was submitted on 11/9 to preferred pharmacy.  Requested a c/b if there was further difficulty in obtaining refill.

## 2017-11-13 NOTE — TELEPHONE ENCOUNTER
Order from 11-9-17 did not transmit - verbal order called - changed to caps as that is what the patient has been taking per pharmacy.      Kathleen M Doege RN

## 2017-12-18 ENCOUNTER — TELEPHONE (OUTPATIENT)
Dept: OTHER | Facility: OUTPATIENT CENTER | Age: 27
End: 2017-12-18

## 2018-01-11 DIAGNOSIS — F41.8 DEPRESSION WITH ANXIETY: ICD-10-CM

## 2018-01-11 RX ORDER — LAMOTRIGINE 100 MG/1
150 TABLET ORAL DAILY
Qty: 45 TABLET | Refills: 0 | Status: SHIPPED | OUTPATIENT
Start: 2018-01-11 | End: 2018-02-09

## 2018-01-11 NOTE — TELEPHONE ENCOUNTER
Medication requested: Lamictal 100 mg tabs  Last refilled: 12-12-17  Qty: 45      Last seen: 12-23-17  RTC: 3 mo  Cancel: 0  No-show: 0  Next appt: 1-22-18    Refill decision: Refilled for 30 days per protocol.      Kathleen M Doege RN

## 2018-02-09 DIAGNOSIS — F41.8 DEPRESSION WITH ANXIETY: ICD-10-CM

## 2018-02-09 RX ORDER — LAMOTRIGINE 100 MG/1
150 TABLET ORAL DAILY
Qty: 45 TABLET | Refills: 0 | Status: SHIPPED | OUTPATIENT
Start: 2018-02-09 | End: 2018-03-06

## 2018-02-09 NOTE — TELEPHONE ENCOUNTER
Medication requested: Lamictal 100 mg tabs  Last refilled: 1-11-18  Qty: 45      Last seen: 10-23-17  RTC: 3 mo  Cancel: 1  No-show: 1  Next appt: 2-22-18    Refill decision: Refill pended and routed to the provider for review/determination due to: cancellation x1, NOS x1 and Pt outside of RTC timeframe.      Kathleen M Doege RN

## 2018-02-15 DIAGNOSIS — F41.0 PANIC ATTACKS: ICD-10-CM

## 2018-02-15 DIAGNOSIS — F41.8 DEPRESSION WITH ANXIETY: ICD-10-CM

## 2018-02-15 RX ORDER — VENLAFAXINE HYDROCHLORIDE 150 MG/1
300 CAPSULE, EXTENDED RELEASE ORAL DAILY
Qty: 60 CAPSULE | Refills: 0 | Status: SHIPPED | OUTPATIENT
Start: 2018-02-15 | End: 2018-03-06

## 2018-02-15 NOTE — TELEPHONE ENCOUNTER
Medication requested: venlafaxine (EFFEXOR-XR) 150 MG   Last refilled: 1/5/18  Qty: 60    Last seen: 10/23/17  RTC: 3 MOS  Cancel:  X 1  No-show: X 1  Next appt: NONE     Refill decision: Refill pended and routed to the provider for review/determination due to : RTC OVER DUE  1X CANCEL 1X NOSHOW       30 DAY RF ETHAN  Scheduling has been notified to contact the pt for appointment

## 2018-02-22 ENCOUNTER — OFFICE VISIT (OUTPATIENT)
Dept: OTHER | Facility: OUTPATIENT CENTER | Age: 28
End: 2018-02-22
Payer: COMMERCIAL

## 2018-02-22 DIAGNOSIS — F43.10 PTSD (POST-TRAUMATIC STRESS DISORDER): ICD-10-CM

## 2018-02-22 DIAGNOSIS — F64.0 GENDER DYSPHORIA IN ADULT: Primary | ICD-10-CM

## 2018-02-22 NOTE — MR AVS SNAPSHOT
After Visit Summary   2/22/2018    Parveen Kaplan    MRN: [id number]           Patient Information     Date Of Birth          1990        Visit Information        Provider Department      2/22/2018 9:00 AM Denny Hdez, PhD  [facility]        Today's Diagnoses     Gender dysphoria in adult    -  1    PTSD (post-traumatic stress disorder)           Follow-ups after your visit        Who to contact     Please call your clinic at [phone] to:    Ask questions about your health    Make or cancel appointments    Discuss your medicines    Learn about your test results    Speak to your doctor            Additional Information About Your Visit        MyChart Information     Annexon gives you secure access to your electronic health record. If you see a primary care provider, you can also send messages to your care team and make appointments. If you have questions, please call your primary care clinic.  If you do not have a primary care provider, please call [phone] and they will assist you.      Annexon is an electronic gateway that provides easy, online access to your medical records. With Annexon, you can request a clinic appointment, read your test results, renew a prescription or communicate with your care team.     To access your existing account, please contact your [facility] or call [phone] for assistance.        Care EveryWhere ID     This is your Care EveryWhere ID. This could be used by other organizations to access your [facility] medical records  [id number]         Blood Pressure from Last 3 Encounters:   10/23/17 110/78   06/21/17 117/75   05/26/17 111/77    Weight from Last 3 Encounters:   10/23/17 83.3 kg (183 lb 9.6 oz)   06/21/17 84 kg (185 lb 3.2 oz)   05/26/17 83.1 kg (183 lb 3.2 oz)              We Performed the Following     Individual Psychotherapy (53+ min) [19288]        Primary Care Provider Fax #    [facility] [phone]       [address] [place] [address] N.  [facility] [address]        Equal Access to Services     COLUMBA TONY : Hadii radha diaz meghan Jones, jenyda yosef, emmanuelle liu, kim guyarturo colón laMichellekatja marques. So [facility] [phone].    ATENCIÓN: Si habla español, tiene a loera disposición servicios gratuitos de asistencia lingüística. Llame al [phone].    We comply with applicable federal civil rights laws and [place] laws. We do not discriminate on the basis of race, color, national origin, age, disability, sex, sexual orientation, or gender identity.            Thank you!     Thank you for choosing [place] [facility]  for your care. Our goal is always to provide you with excellent care. Hearing back from our patients is one way we can continue to improve our services. Please take a few minutes to complete the written survey that you may receive in the mail after your visit with us. Thank you!             Your Updated Medication List - Protect others around you: Learn how to safely use, store and throw away your medicines at www.disposemymeds.org.          This list is accurate as of 2/22/18  9:58 AM.  Always use your most recent med list.                   Brand Name Dispense Instructions for use Diagnosis    gabapentin 300 MG capsule    NEURONTIN    150 capsule    Take 600 mg in the morning, 300 mg in the afternoon, and 600 mg at bedtime for a total daily dose of 1500 mg    Depression with anxiety       hydrOXYzine 25 MG capsule    VISTARIL    30 capsule    Take 1 capsule (25 mg) by mouth every 6 hours as needed for itching    Gender identity disorder       lamoTRIgine 100 MG tablet    LAMICTAL    45 tablet    Take 1.5 tablets (150 mg) by mouth daily    Depression with anxiety       syringe/needle (disp) 25G X 1\" 1 ML Misc     10 each    To use with weekly IM injection    Gender identity disorder in adolescents or adults       testosterone cypionate 200 MG/ML " injection    DEPO-TESTOSTERONE    2 mL    Inject 0.25 mLs (50 mg) into the muscle once a week    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 This will be her last refill until she is seen.    Depression with anxiety, Panic attacks
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
## 2018-02-26 ENCOUNTER — TELEPHONE (OUTPATIENT)
Dept: PSYCHIATRY | Facility: CLINIC | Age: 28
End: 2018-02-26

## 2018-02-26 DIAGNOSIS — F41.8 DEPRESSION WITH ANXIETY: ICD-10-CM

## 2018-02-26 NOTE — TELEPHONE ENCOUNTER
Prior Authorization Retail Medication Request  Medication/Dose: Effexor  mg caps  Diagnosis and ICD code: MDD, recurrent, moderate depressive symptoms (F33.1); Generalized anxiety disorder (F 41.1)  New/Renewal/Insurance Change PA: unknown    Previously Tried and Failed Therapies:   *pt has only been seen at this clinic since 5/2017 so not all records are available  Wellbutrin XL (unknown- 5/2017) increased anxiety, Remeron (prior to 5/2017) ineffective, Paxil (prior to 5/2017) caused SI, Buspar (2016-6/2017) ineffective, Prazosin (prior to 5/2017) too sedating, Trazodone (unknown date) ineffective for mood    Clinical:  Pt has been on Effexor  mg daily since prior to their first appt at this clinic, in 5/2017.  Pt has not had adequate response at lower doses and continues to have severe depression. Even though 300mg daily is higher than FDA recommended dose, it is the standard in psychiatric treatment in the community to use this dose for treatment of Major Depressive Disorder. Pt's depression and anxiety is currently being treated with a combination of Effexor XR, Lamictal, Gabapentin, and rare hydroxyzine use.  There is no clinical indication to adjust dosing.  Please continue to authorize as dose decrease may result in decompensation of pt.      Insurance ID (if provided): [id number]  Insurance Phone (if provided): [phone]    Any additional info from fax request: quantity limit    If you received a fax notification from an outside Pharmacy:  Pharmacy Name: [facility]  Pharmacy #: [phone]  Pharmacy Fax: [phone]

## 2018-02-28 NOTE — TELEPHONE ENCOUNTER
PA not required    Medication: Effexor  mg - no PA required  Insurance Company: [employer] - Phone [phone] Fax [phone]  Pharmacy Filling the Rx: [facility] [address] [place], [place] [address] [address] [address] AT [facility] OF [address]  Filling Pharmacy Phone: [phone]  Filling Pharmacy Fax: [phone]  Start Date: 2/28/2018      Per pharmacy issue has been resolved

## 2018-02-28 NOTE — TELEPHONE ENCOUNTER
Central Prior Authorization Team   Phone: [phone]    PA Initiation    Medication: Effexor  mg  Insurance Company: [employer] - Phone [phone] Fax [phone]  Pharmacy Filling the Rx: [facility] [address] [place] [address] [address] AT [facility] OF  & [address]  Filling Pharmacy Phone: [phone]  Filling Pharmacy Fax: [phone]  Start Date: 2/28/2018      Waiting for questions to generate

## 2018-03-06 ENCOUNTER — OFFICE VISIT (OUTPATIENT)
Dept: PSYCHIATRY | Facility: CLINIC | Age: 28
End: 2018-03-06
Attending: PSYCHIATRY & NEUROLOGY
Payer: COMMERCIAL

## 2018-03-06 VITALS
HEART RATE: 91 BPM | WEIGHT: 192.2 LBS | SYSTOLIC BLOOD PRESSURE: 116 MMHG | DIASTOLIC BLOOD PRESSURE: 76 MMHG | BODY MASS INDEX: 27.58 KG/M2

## 2018-03-06 DIAGNOSIS — F41.0 PANIC ATTACKS: ICD-10-CM

## 2018-03-06 DIAGNOSIS — F41.8 DEPRESSION WITH ANXIETY: ICD-10-CM

## 2018-03-06 DIAGNOSIS — F64.0 GENDER IDENTITY DISORDER IN ADOLESCENTS OR ADULTS: ICD-10-CM

## 2018-03-06 PROCEDURE — G0463 HOSPITAL OUTPT CLINIC VISIT: HCPCS | Mod: ZF

## 2018-03-06 RX ORDER — GABAPENTIN 300 MG/1
CAPSULE ORAL
Qty: 120 CAPSULE | Refills: 2 | Status: SHIPPED | OUTPATIENT
Start: 2018-03-06 | End: 2018-06-22

## 2018-03-06 RX ORDER — VENLAFAXINE HYDROCHLORIDE 150 MG/1
300 CAPSULE, EXTENDED RELEASE ORAL DAILY
Qty: 60 CAPSULE | Refills: 2 | Status: SHIPPED | OUTPATIENT
Start: 2018-03-06 | End: 2018-06-11

## 2018-03-06 RX ORDER — TESTOSTERONE CYPIONATE 200 MG/ML
70 INJECTION, SOLUTION INTRAMUSCULAR WEEKLY
Qty: 2 ML | Refills: 1 | COMMUNITY
Start: 2018-03-06 | End: 2020-01-15

## 2018-03-06 RX ORDER — LAMOTRIGINE 100 MG/1
150 TABLET ORAL DAILY
Qty: 45 TABLET | Refills: 2 | Status: SHIPPED | OUTPATIENT
Start: 2018-03-06 | End: 2018-06-22

## 2018-03-06 ASSESSMENT — PAIN SCALES - GENERAL: PAINLEVEL: NO PAIN (0)

## 2018-03-06 NOTE — MR AVS SNAPSHOT
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      Your next 10 appointments already scheduled     Jun 04, 2018 10:15 AM CDT   Adult Med Follow UP with Nica Peters MD   Psychiatry Clinic ([facility])    [place] [id number]  [address] [address] [phone]              Who to contact     Please call your clinic at [phone] to:    Ask questions about your health    Make or cancel appointments    Discuss your medicines    Learn about your test results    Speak to your doctor            Additional Information About Your Visit        MyChart Information     Slacker gives you secure access to your electronic health record. If you see a primary care provider, you can also send messages to your care team and make appointments. If you have questions, please call your primary care clinic.  If you do not have a primary care provider, please call [phone] and they will assist you.      Slacker is an electronic gateway that provides easy, online access to your medical records. With Slacker, you can request a clinic appointment, read your test results, renew a prescription or communicate with your care team.     To access your existing account, please contact your [facility] or call [phone] for assistance.        Care EveryWhere ID      This is your Care EveryWhere ID. This could be used by other organizations to access your [place] medical records  [id number]        Your Vitals Were     Pulse BMI (Body Mass Index)                91 27.58 kg/m2           Blood Pressure from Last 3 Encounters:   03/06/18 116/76   10/23/17 110/78   06/21/17 117/75    Weight from Last 3 Encounters:   03/06/18 87.2 kg (192 lb 3.2 oz)   10/23/17 83.3 kg (183 lb 9.6 oz)   06/21/17 84 kg (185 lb 3.2 oz)              Today, you had the following     No orders found for display         Today's Medication Changes          These changes are accurate as of 3/6/18 11:59 PM.  If you have any questions, ask your nurse or doctor.               These medicines have changed or have updated prescriptions.        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   Generic drug:  testosterone cypionate   Changed by:  Nica Peters MD        Dose:  70 mg   Inject 0.35 mLs (70 mg) into the muscle once a week   Quantity:  2 mL   Refills:  1       gabapentin 300 MG capsule   Commonly known as:  NEURONTIN   This may have changed:  additional instructions   Used for:  Depression with anxiety   Changed by:  Nica Peters MD        Take 900 mg in the morning, 300 mg in the afternoon as needed for anxiety for a total daily dose of 1200 mg   Quantity:  120 capsule   Refills:  2            Where to get your medicines      These medications were sent to [facility] [address] [place] [address] [facility]  AT [facility] OF [address] [facility]  [address] [facility] , [facility] [address]     Phone:  [phone]     gabapentin 300 MG capsule    lamoTRIgine 100 MG tablet    venlafaxine 150 MG 24 hr capsule                Primary Care Provider Fax #    [facility] [phone]       [address] [place] [address] [facility] [address]        Equal Access to  "Services     [facility]: Hadii aad ku hadevonanthony Daliajerman, watrevorda luqadaha, qaybta kaalmakomal liu, kim marques. So [facility] [phone].    ATENCIÓN: Si habla daxa, tiene a loera disposición servicios gratuitos de asistencia lingüística. Llame al [phone].    We comply with applicable federal civil rights laws and [place] laws. We do not discriminate on the basis of race, color, national origin, age, disability, sex, sexual orientation, or gender identity.            Thank you!     Thank you for choosing PSYCHIATRY CLINIC  for your care. Our goal is always to provide you with excellent care. Hearing back from our patients is one way we can continue to improve our services. Please take a few minutes to complete the written survey that you may receive in the mail after your visit with us. Thank you!             Your Updated Medication List - Protect others around you: Learn how to safely use, store and throw away your medicines at www.disposemymeds.org.          This list is accurate as of 3/6/18 11:59 PM.  Always use your most recent med list.                   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       gabapentin 300 MG capsule    NEURONTIN    120 capsule    Take 900 mg in the morning, 300 mg in the afternoon as needed for anxiety for a total daily dose of 1200 mg    Depression with anxiety       hydrOXYzine 25 MG capsule    VISTARIL    30 capsule    Take 1 capsule (25 mg) by mouth every 6 hours as needed for itching    Gender identity disorder       lamoTRIgine 100 MG tablet    LAMICTAL    45 tablet    Take 1.5 tablets (150 mg) by mouth daily    Depression with anxiety       syringe/needle (disp) 25G X 1\" 1 ML Misc     10 each    To use with weekly IM injection    Gender " identity disorder in adolescents or adults       venlafaxine 150 MG 24 hr capsule    EFFEXOR-XR    60 capsule    Take 2 capsules (300 mg) by mouth daily Call clinic to schedule follow up appointment. This will be her last refill until she is seen.    Depression with anxiety, Panic attacks

## 2018-03-06 NOTE — PATIENT INSTRUCTIONS
-Eat something satisfying every three hours  -Melatonin 3-6 mg with dinner (or 3 hours before bedtime)
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
## 2018-03-06 NOTE — NURSING NOTE
Chief Complaint   Patient presents with     Recheck Medication     Gender dysphoria in adult     Reviewed Allergies, Medications, Pharmacy, Smoking Status, and Pain Level  Administered Abuse Screening Questions   Obtained Weight, Blood Pressure, Heart Rate

## 2018-03-07 ASSESSMENT — PATIENT HEALTH QUESTIONNAIRE - PHQ9: SUM OF ALL RESPONSES TO PHQ QUESTIONS 1-9: 9

## 2018-06-11 DIAGNOSIS — F41.0 PANIC ATTACKS: ICD-10-CM

## 2018-06-11 DIAGNOSIS — F41.8 DEPRESSION WITH ANXIETY: ICD-10-CM

## 2018-06-11 RX ORDER — VENLAFAXINE HYDROCHLORIDE 150 MG/1
300 CAPSULE, EXTENDED RELEASE ORAL DAILY
Qty: 60 CAPSULE | Refills: 2 | Status: SHIPPED | OUTPATIENT
Start: 2018-06-11 | End: 2018-06-22

## 2018-06-11 NOTE — TELEPHONE ENCOUNTER
Medication requested: Venlafaxine 150 mg tabs  Last refilled: 5-21-18  Qty: 60      Last seen: 3-6-18  RTC: 3 mo  Cancel: 1  No-show: 0  Next appt: none    Refill decision:   Refill pended and routed to the provider for review/determination due to cancellation x1 and no scheduled appointment.    Scheduling has been notified to contact the pt for appointment.    Kathleen M Doege RN

## 2018-06-13 ENCOUNTER — TELEPHONE (OUTPATIENT)
Dept: PSYCHIATRY | Facility: CLINIC | Age: 28
End: 2018-06-13

## 2018-06-13 NOTE — TELEPHONE ENCOUNTER
Central Prior Authorization Team   Phone: [phone]    PA Initiation    Medication: venlafaxine  mg capsule  Insurance Company: [employer] - Phone [phone] Fax [phone]  [facility] [address] [place] [address] [address] AT [facility] OF  & [address]  Filling Pharmacy Phone: [phone]  Filling Pharmacy Fax: [phone]  Start Date: 6/13/2018

## 2018-06-13 NOTE — TELEPHONE ENCOUNTER
Prior Authorization Retail Medication Request    Prior Authorization Retail Medication Request  Medication/Dose: Effexor  mg caps  Diagnosis and ICD code: MDD, recurrent, moderate depressive symptoms (F33.1); Generalized anxiety disorder (F 41.1)  New/Renewal/Insurance Change PA: previously submitted on 2/26/18 and the response was no PA was needed     Previously Tried and Failed Therapies:   *pt has only been seen at this clinic since 5/2017 so not all records are available  Wellbutrin XL (unknown- 5/2017) increased anxiety, Remeron (prior to 5/2017) ineffective, Paxil (prior to 5/2017) caused SI, Buspar (2016-6/2017) ineffective, Prazosin (prior to 5/2017) too sedating, Trazodone (unknown date) ineffective for mood     Clinical:  Pt has been on Effexor  mg daily since prior to their first appt at this clinic, in 5/2017.  Pt has not had adequate response at lower doses and continues to have severe depression. Even though 300mg daily is higher than FDA recommended dose, it is the standard in psychiatric treatment in the community to use this dose for treatment of Major Depressive Disorder. Pt's depression and anxiety is currently being treated with a combination of Effexor XR, Lamictal, Gabapentin, and rare hydroxyzine use.  There is no clinical indication to adjust dosing.  Please continue to authorize as dose decrease may result in decompensation of pt.        Insurance ID (if provided): [id number]  Insurance Phone (if provided): [phone]     Any additional info from fax request: quantity limit     If you received a fax notification from an outside Pharmacy:  Pharmacy Name: [facility]  Pharmacy #: [phone]  Pharmacy Fax: [phone]    Called the pharmacy to verify that a PA is needed since nothing has changed since February, but staff confirmed that a PA is now needed.    Will route to PA team for assistance.

## 2018-06-21 ENCOUNTER — OFFICE VISIT (OUTPATIENT)
Dept: OTHER | Facility: OUTPATIENT CENTER | Age: 28
End: 2018-06-21
Payer: COMMERCIAL

## 2018-06-21 DIAGNOSIS — F64.0 GENDER DYSPHORIA IN ADULT: Primary | ICD-10-CM

## 2018-06-21 DIAGNOSIS — F43.10 PTSD (POST-TRAUMATIC STRESS DISORDER): ICD-10-CM

## 2018-06-21 NOTE — MR AVS SNAPSHOT
After Visit Summary   6/21/2018    Parveen Kaplan    MRN: [id number]           Patient Information     Date Of Birth          1990        Visit Information        Provider Department      6/21/2018 4:00 PM Denny Hdez, PhD  [facility]        Today's Diagnoses     Gender dysphoria in adult    -  1    PTSD (post-traumatic stress disorder)           Follow-ups after your visit        Your next 10 appointments already scheduled     Jun 22, 2018  1:15 PM CDT   Adult Med Follow UP with Nica Peters MD   Psychiatry Clinic ([facility])    [place] [id number]  [address] [id number] [phone]              Who to contact     Please call your clinic at [phone] to:    Ask questions about your health    Make or cancel appointments    Discuss your medicines    Learn about your test results    Speak to your doctor            Additional Information About Your Visit        MyChart Information     [employer] gives you secure access to your electronic health record. If you see a primary care provider, you can also send messages to your care team and make appointments. If you have questions, please call your primary care clinic.  If you do not have a primary care provider, please call [phone] and they will assist you.      [employer] is an electronic gateway that provides easy, online access to your medical records. With [employer], you can request a clinic appointment, read your test results, renew a prescription or communicate with your care team.     To access your existing account, please contact your [facility] or call [phone] for assistance.        Care EveryWhere ID     This is your Care EveryWhere ID. This could be used by other organizations to access your [place] medical records  [id number]         Blood Pressure from Last 3 Encounters:   03/06/18 116/76   10/23/17 110/78   06/21/17 117/75     Weight from Last 3 Encounters:   03/06/18 87.2 kg (192 lb 3.2 oz)   10/23/17 83.3 kg (183 lb 9.6 oz)   06/21/17 84 kg (185 lb 3.2 oz)              We Performed the Following     Individual Psychotherapy (53+ min) [96366]        Primary Care Provider Fax #    [facility] [phone]       [facility] [place] [address] [facility] [address]        Equal Access to Services     COLUMBA TONY : Hadii aad ku hadasho Soomaali, waaxda luqadaha, qaybta kaalmada adeegyada, waxay guyin haykatja marques. So [facility] [phone].    ATENCIÓN: Si habla español, tiene a loera disposición servicios gratuitos de asistencia lingüística. [facility] [phone].    We comply with applicable federal civil rights laws and [place] laws. We do not discriminate on the basis of race, color, national origin, age, disability, sex, sexual orientation, or gender identity.            Thank you!     Thank you for choosing [facility]  for your care. Our goal is always to provide you with excellent care. Hearing back from our patients is one way we can continue to improve our services. Please take a few minutes to complete the written survey that you may receive in the mail after your visit with us. Thank you!             Your Updated Medication List - Protect others around you: Learn how to safely use, store and throw away your medicines at www.disposemymeds.org.          This list is accurate as of 6/21/18  4:58 PM.  Always use your most recent med list.                   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       gabapentin 300 MG capsule    NEURONTIN    120 capsule    Take 900 mg in the morning, 300 mg in the afternoon as needed for anxiety for a total daily dose of 1200 mg    Depression with anxiety  "      hydrOXYzine 25 MG capsule    VISTARIL    30 capsule    Take 1 capsule (25 mg) by mouth every 6 hours as needed for itching    Gender identity disorder       lamoTRIgine 100 MG tablet    LAMICTAL    45 tablet    Take 1.5 tablets (150 mg) by mouth daily    Depression with anxiety       syringe/needle (disp) 25G X 1\" 1 ML Misc     10 each    To use with weekly IM injection    

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
## 2018-06-22 ENCOUNTER — OFFICE VISIT (OUTPATIENT)
Dept: PSYCHIATRY | Facility: CLINIC | Age: 28
End: 2018-06-22
Attending: PSYCHIATRY & NEUROLOGY
Payer: COMMERCIAL

## 2018-06-22 VITALS
DIASTOLIC BLOOD PRESSURE: 65 MMHG | WEIGHT: 194.6 LBS | HEART RATE: 79 BPM | SYSTOLIC BLOOD PRESSURE: 118 MMHG | BODY MASS INDEX: 27.92 KG/M2

## 2018-06-22 DIAGNOSIS — F41.0 PANIC ATTACKS: ICD-10-CM

## 2018-06-22 DIAGNOSIS — F41.8 DEPRESSION WITH ANXIETY: ICD-10-CM

## 2018-06-22 PROCEDURE — G0463 HOSPITAL OUTPT CLINIC VISIT: HCPCS | Mod: ZF

## 2018-06-22 RX ORDER — GABAPENTIN 300 MG/1
CAPSULE ORAL
Qty: 150 CAPSULE | Refills: 2 | Status: SHIPPED | OUTPATIENT
Start: 2018-06-22 | End: 2018-08-03

## 2018-06-22 RX ORDER — LAMOTRIGINE 100 MG/1
150 TABLET ORAL DAILY
Qty: 45 TABLET | Refills: 2 | Status: SHIPPED | OUTPATIENT
Start: 2018-06-22 | End: 2018-08-03

## 2018-06-22 RX ORDER — VENLAFAXINE HYDROCHLORIDE 150 MG/1
300 CAPSULE, EXTENDED RELEASE ORAL DAILY
Qty: 60 CAPSULE | Refills: 2 | Status: SHIPPED | OUTPATIENT
Start: 2018-06-22 | End: 2018-08-03

## 2018-06-22 ASSESSMENT — PAIN SCALES - GENERAL: PAINLEVEL: MILD PAIN (3)

## 2018-06-22 NOTE — MR AVS SNAPSHOT
After Visit Summary   6/22/2018    Parveen Kaplan    MRN: [id number]           Patient Information     Date Of Birth          1990        Visit Information        Provider Department      6/22/2018 1:15 PM Nica Peters MD Psychiatry Clinic        Today's Diagnoses     Depression with anxiety        Panic attacks           Follow-ups after your visit        Follow-up notes from your care team     Return in about 4 weeks (around 7/20/2018).      Your next 10 appointments already scheduled     Jul 16, 2018  9:00 AM CDT   Individual Therapy 53+ minutes with Nathalie Baldwin PhD   [facility] ([facility])    [address]  [address]  [facility] [address]   [phone]            Aug 01, 2018 10:00 AM CDT   Individual Therapy 53+ minutes with Nathalie Baldwin PhD   [facility] ([facility])    [address]  [address]  [facility] [address]   [phone]            Aug 03, 2018  2:40 PM CDT   Adult Med Follow UP with Loly Driscoll MD   Psychiatry Clinic ([facility])    [address]  [address] [address]   [phone]            Aug 20, 2018  3:00 PM CDT   Individual Therapy 53+ minutes with Nathalie Baldwin PhD   [facility] ([facility])    [address]  [address]  [facility] [address]   [phone]              Who to contact     Please call your clinic at [phone] to:    Ask questions about your health    Make or cancel appointments    Discuss your medicines    Learn about your test results    Speak to your doctor            Additional Information About Your Visit        MyChart Information     [employer] gives you secure access to your electronic health record. If you see a primary care provider, you can also send messages to your care team and make appointments. If you have questions, please call your primary  [facility] clinic.  If you do not have a primary care provider, please call [phone] and they will assist you.      [employer] is an electronic gateway that provides easy, online access to your medical records. With [employer], you can request a clinic appointment, read your test results, renew a prescription or communicate with your care team.     To access your existing account, please contact your [facility] or call [phone] for assistance.        Care EveryWhere ID     This is your Care EveryWhere ID. This could be used by other organizations to access your [place] medical records  [id number]        Your Vitals Were     Pulse BMI (Body Mass Index)                79 27.92 kg/m2           Blood Pressure from Last 3 Encounters:   06/22/18 118/65   03/06/18 116/76   10/23/17 110/78    Weight from Last 3 Encounters:   06/22/18 88.3 kg (194 lb 9.6 oz)   03/06/18 87.2 kg (192 lb 3.2 oz)   10/23/17 83.3 kg (183 lb 9.6 oz)              Today, you had the following     No orders found for display         Today's Medication Changes          These changes are accurate as of 6/22/18 11:59 PM.  If you have any questions, ask your nurse or doctor.               These medicines have changed or have updated prescriptions.        Dose/Directions    gabapentin 300 MG capsule   Commonly known as:  NEURONTIN   This may have changed:  additional instructions   Used for:  Depression with anxiety   Changed by:  Nica Peters MD        Take 900 mg in the morning, 300 mg twice a day as needed for anxiety for a total daily dose of 1500 mg   Quantity:  150 capsule   Refills:  2       hydrOXYzine 25 MG capsule   Commonly known as:  VISTARIL   This may have changed:  reasons to take this   Used for:  Gender identity disorder        Dose:  25 mg   Take 1 capsule (25 mg) by mouth every 6 hours as needed for itching   Quantity:  30 capsule   Refills:  0            Where to get your medicines      These  medications were sent to [facility] [id number] [place] [address] - [address] [facility]  [facility]  [address] [place] [address]     Phone:  [phone]     gabapentin 300 MG capsule    lamoTRIgine 100 MG tablet    venlafaxine 150 MG 24 hr capsule                Primary Care Provider Fax #    [facility] [phone]       [address] [facility] [address]  [facility] [address]        Equal Access to Services     [facility] CECILY : Hadii aad ku hadasho Soomaali, waaxda luqadaha, qaybta kaalmada adeegyada, kim dubois . So [facility] [phone].    ATENCIÓN: Si habla español, tiene a loera disposición servicios gratuitos de asistencia lingüística. [facility] [phone].    We comply with applicable federal civil rights laws and [place] laws. We do not discriminate on the basis of race, color, national origin, age, disability, sex, sexual orientation, or gender identity.            Thank you!     Thank you for choosing PSYCHIATRY CLINIC  for your care. Our goal is always to provide you with excellent care. Hearing back from our patients is one way we can continue to improve our services. Please take a few minutes to complete the written survey that you may receive in the mail after your visit with us. Thank you!             Your Updated Medication List - Protect others around you: Learn how to safely use, store and throw away your medicines at www.disposemymeds.org.          This list is accurate as of 6/22/18 11:59 PM.  Always use your most recent med list.                   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       gabapentin 300 MG capsule    NEURONTIN    150 capsule    Take 900 mg in the morning, 300 mg twice a day as needed for anxiety for  "a total daily dose of 1500 mg    Depression with anxiety       hydrOXYzine 25 MG capsule    VISTARIL    30 capsule    Take 1 capsule (25 mg) by mouth every 6 hours as needed for itching    Gender identity disorder       lamoTRIgine 100 MG tablet    LAMICTAL    45 tablet    Take 1.5 tablets (150 mg) by mouth daily    Depression with anxiety       syringe/needle (disp) 25G X 1\" 1 ML Misc     10 each    To use with weekly IM injection    

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
## 2018-06-22 NOTE — NURSING NOTE
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

## 2018-06-27 ASSESSMENT — PATIENT HEALTH QUESTIONNAIRE - PHQ9: SUM OF ALL RESPONSES TO PHQ QUESTIONS 1-9: 11

## 2018-06-28 ENCOUNTER — OFFICE VISIT (OUTPATIENT)
Dept: OTHER | Facility: OUTPATIENT CENTER | Age: 28
End: 2018-06-28
Payer: COMMERCIAL

## 2018-06-28 DIAGNOSIS — F64.0 GENDER DYSPHORIA IN ADULT: Primary | ICD-10-CM

## 2018-06-28 DIAGNOSIS — F43.10 PTSD (POST-TRAUMATIC STRESS DISORDER): ICD-10-CM

## 2018-06-28 NOTE — MR AVS SNAPSHOT
After Visit Summary   6/28/2018    Parveen Kaplan    MRN: [id number]           Patient Information     Date Of Birth          1990        Visit Information        Provider Department      6/28/2018 2:00 PM Denny Hdez, PhD  [facility]        Today's Diagnoses     Gender dysphoria in adult    -  1    PTSD (post-traumatic stress disorder)           Follow-ups after your visit        Your next 10 appointments already scheduled     Jul 16, 2018  9:00 AM CDT   Individual Therapy 53+ minutes with Nathalie Baldwin,    [facility] ([facility])    [address]  Mail [address] 7521  [facility] [address]   [phone]            Aug 01, 2018 10:00 AM CDT   Individual Therapy 53+ minutes with Nathalie Baldwin PhD   [facility] ([facility])    [address]  [address]  [facility] [address]   [phone]            Aug 03, 2018  2:40 PM CDT   Adult Med Follow UP with Loly Driscoll MD   Psychiatry Clinic ([facility])    [address]  [address] [address]   [phone]            Aug 20, 2018  3:00 PM CDT   Individual Therapy 53+ minutes with Nathalie Baldwin PhD   [facility] ([facility])    [address]  [address]  [facility] [address]   [phone]              Who to contact     Please call your clinic at [phone] to:    Ask questions about your health    Make or cancel appointments    Discuss your medicines    Learn about your test results    Speak to your doctor            Additional Information About Your Visit        MyChart Information     Storonet gives you secure access to your electronic health record. If you see a primary care provider, you can also send messages to your care team and make appointments. If you have questions, please call your primary care clinic.  If you do not have a primary  care provider, please call [phone] and they will assist you.      DearJane is an electronic gateway that provides easy, online access to your medical records. With DearJane, you can request a clinic appointment, read your test results, renew a prescription or communicate with your care team.     To access your existing account, please contact your [facility] or call [phone] for assistance.        Care EveryWhere ID     This is your Care EveryWhere ID. This could be used by other organizations to access your [place] medical records  [id number]         Blood Pressure from Last 3 Encounters:   06/22/18 118/65   03/06/18 116/76   10/23/17 110/78    Weight from Last 3 Encounters:   06/22/18 88.3 kg (194 lb 9.6 oz)   03/06/18 87.2 kg (192 lb 3.2 oz)   10/23/17 83.3 kg (183 lb 9.6 oz)              We Performed the Following     Individual Psychotherapy (53+ min) [20713]          Today's Medication Changes          These changes are accurate as of 6/28/18  2:58 PM.  If you have any questions, ask your nurse or doctor.               These medicines have changed or have updated prescriptions.        Dose/Directions    hydrOXYzine 25 MG capsule   Commonly known as:  VISTARIL   This may have changed:  reasons to take this   Used for:  Gender identity disorder        Dose:  25 mg   Take 1 capsule (25 mg) by mouth every 6 hours as needed for itching   Quantity:  30 capsule   Refills:  0                Primary Care Provider Fax #    [facility] [phone]       [address] [place] [address] [facility] [address]        Equal Access to Services     COLUMBA TONY AH: Hadalex Jones, waaxda luqadaha, qaybta kaalmakim maier. So [facility] [phone].    ATENCIÓN: Si habla español, tiene a loera disposición servicios gratuitos de asistencia lingüística. Llame al [phone].    We comply with applicable federal civil rights laws  "and [place] laws. We do not discriminate on the basis of race, color, national origin, age, disability, sex, sexual orientation, or gender identity.            Thank you!     Thank you for choosing [facility]  for your care. Our goal is always to provide you with excellent care. Hearing back from our patients is one way we can continue to improve our services. Please take a few minutes to complete the written survey that you may receive in the mail after your visit with us. Thank you!             Your Updated Medication List - Protect others around you: Learn how to safely use, store and throw away your medicines at www.disposemymeds.org.          This list is accurate as of 6/28/18  2:58 PM.  Always use your most recent med list.                   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       gabapentin 300 MG capsule    NEURONTIN    150 capsule    Take 900 mg in the morning, 300 mg twice a day as needed for anxiety for a total daily dose of 1500 mg    Depression with anxiety       hydrOXYzine 25 MG capsule    VISTARIL    30 capsule    Take 1 capsule (25 mg) by mouth every 6 hours as needed for itching    Gender identity disorder       lamoTRIgine 100 MG tablet    LAMICTAL    45 tablet    Take 1.5 tablets (150 mg) by mouth daily    Depression with anxiety       syringe/needle (disp) 25G X 1\" 1 ML Misc     10 each    To use with weekly IM injection    

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
## 2018-07-16 ENCOUNTER — OFFICE VISIT (OUTPATIENT)
Dept: OTHER | Facility: OUTPATIENT CENTER | Age: 28
End: 2018-07-16
Payer: COMMERCIAL

## 2018-07-16 DIAGNOSIS — F43.10 POSTTRAUMATIC STRESS DISORDER: ICD-10-CM

## 2018-07-16 DIAGNOSIS — F64.0 GENDER DYSPHORIA IN ADOLESCENT AND ADULT: Primary | ICD-10-CM

## 2018-07-16 NOTE — MR AVS SNAPSHOT
After Visit Summary   7/16/2018    Parveen Kaplan    MRN: [id number]           Patient Information     Date Of Birth          1990        Visit Information        Provider Department      7/16/2018 9:00 AM Nathalie Baldwin, PhD [facility]        Today's Diagnoses     Gender dysphoria in adolescent and adult    -  1    Posttraumatic stress disorder           Follow-ups after your visit        Your next 10 appointments already scheduled     Aug 20, 2018  3:00 PM CDT   Individual Therapy 53+ minutes with Nathalie Baldwin PhD   [facility] ([facility])    [address]  [address]  [facility] [address]   [phone]            Sep 05, 2018  8:00 AM CDT   Individual Therapy 53+ minutes with Nathalie Baldwin PhD   [facility] ([facility])    [address]  [address]  [facility] [address]   [phone]            Sep 19, 2018  4:00 PM CDT   Individual Therapy 53+ minutes with Nathalie Baldwin PhD   [facility] ([facility])    [address]  [address]  [facility] [address]   [phone]            Oct 03, 2018  8:00 AM CDT   Individual Therapy 53+ minutes with Nathalie Baldwin PhD   [facility] ([facility])    [address]  [address]  [facility] [address]   [phone]            Oct 17, 2018  8:00 AM CDT   Individual Therapy 53+ minutes with Nathalie Baldwin, PhD   [facility] ([facility])    [address]  [address]  [facility] [address]   [phone]            Oct 31, 2018  9:00 AM CDT   Individual Therapy 53+ minutes with Nathalie Baldwin PhD   [facility] ([facility])    [address]  [address]  [facility] [address]   [phone]            Nov 15, 2018  8:00 AM CST   Adult Med Follow UP with Loly Driscoll MD   Psychiatry Clinic  ([facility])    [address]  [address] [id number] [phone]              Who to contact     Please call your clinic at [phone] to:    Ask questions about your health    Make or cancel appointments    Discuss your medicines    Learn about your test results    Speak to your doctor            Additional Information About Your Visit        EyeEmharDigitalScirocco Information     Bday gives you secure access to your electronic health record. If you see a primary care provider, you can also send messages to your care team and make appointments. If you have questions, please call your primary care clinic.  If you do not have a primary care provider, please call [phone] and they will assist you.      Bday is an electronic gateway that provides easy, online access to your medical records. With Bday, you can request a clinic appointment, read your test results, renew a prescription or communicate with your care team.     To access your existing account, please contact your [facility] or call [phone] for assistance.        Care EveryWhere ID     This is your Care EveryWhere ID. This could be used by other organizations to access your [place] medical records  [id number]         Blood Pressure from Last 3 Encounters:   08/03/18 118/80   06/22/18 118/65   03/06/18 116/76    Weight from Last 3 Encounters:   08/03/18 88.1 kg (194 lb 3.2 oz)   06/22/18 88.3 kg (194 lb 9.6 oz)   03/06/18 87.2 kg (192 lb 3.2 oz)              We Performed the Following     Individual Psychotherapy (53+ min) [50590]          Today's Medication Changes          These changes are accurate as of 7/16/18 11:59 PM.  If you have any questions, ask your nurse or doctor.               These medicines have changed or have updated prescriptions.        Dose/Directions    hydrOXYzine 25 MG capsule   Commonly known as:  VISTARIL   This may have changed:   "reasons to take this   Used for:  Gender identity disorder        Dose:  25 mg   Take 1 capsule (25 mg) by mouth every 6 hours as needed for itching   Quantity:  30 capsule   Refills:  0                Primary Care Provider Fax #    [facility] [phone]       [address] [place] [address] [facility] [address]        Equal Access to Services     COLUMBA TONY : Hadii radha ku hadevono Somarieali, waaxda luqadaha, qaybta kaalmada vinhpeyton, kim sharp dontrellcathy gan jennyaugustine dubois . So [facility] [phone].    ATENCIÓN: Si habla español, tiene a loera disposición servicios gratuitos de asistencia lingüística. Leanna al [phone].    We comply with applicable federal civil rights laws and [place] laws. We do not discriminate on the basis of race, color, national origin, age, disability, sex, sexual orientation, or gender identity.            Thank you!     Thank you for choosing [place] [facility]  for your care. Our goal is always to provide you with excellent care. Hearing back from our patients is one way we can continue to improve our services. Please take a few minutes to complete the written survey that you may receive in the mail after your visit with us. Thank you!             Your Updated Medication List - Protect others around you: Learn how to safely use, store and throw away your medicines at www.disposemymeds.org.          This list is accurate as of 7/16/18 11:59 PM.  Always use your most recent med list.                   

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
## 2018-07-25 ENCOUNTER — TRANSFERRED RECORDS (OUTPATIENT)
Dept: HEALTH INFORMATION MANAGEMENT | Facility: CLINIC | Age: 28
End: 2018-07-25

## 2018-08-01 ENCOUNTER — OFFICE VISIT (OUTPATIENT)
Dept: OTHER | Facility: OUTPATIENT CENTER | Age: 28
End: 2018-08-01
Payer: COMMERCIAL

## 2018-08-01 DIAGNOSIS — F43.10 POSTTRAUMATIC STRESS DISORDER: ICD-10-CM

## 2018-08-01 DIAGNOSIS — F64.0 GENDER DYSPHORIA IN ADOLESCENT AND ADULT: Primary | ICD-10-CM

## 2018-08-01 NOTE — MR AVS SNAPSHOT
After Visit Summary   8/1/2018    Parveen Kaplan    MRN: [id number]           Patient Information     Date Of Birth          1990        Visit Information        Provider Department      8/1/2018 10:00 AM Nathalie Baldwin, PhD [facility]        Today's Diagnoses     Gender dysphoria in adolescent and adult    -  1    Posttraumatic stress disorder           Follow-ups after your visit        Your next 10 appointments already scheduled     Aug 20, 2018  3:00 PM CDT   Individual Therapy 53+ minutes with Nathalie Baldwin PhD   [facility] ([facility])    [address]  [address]  [facility] [address]   [phone]            Sep 05, 2018  8:00 AM CDT   Individual Therapy 53+ minutes with Nathalie Baldwin PhD   [facility] ([facility])    [address]  [address]  [facility] [address]   [phone]            Sep 19, 2018  4:00 PM CDT   Individual Therapy 53+ minutes with Nathalie Baldwin PhD   [facility] ([facility])    [address]  [address]  [facility] [address]   [phone]            Oct 03, 2018  8:00 AM CDT   Individual Therapy 53+ minutes with Nathalie Baldwin PhD   [facility] ([facility])    [address]  [address]  [facility] [address]   [phone]            Oct 17, 2018  8:00 AM CDT   Individual Therapy 53+ minutes with Nathalie Baldwin,    [facility] ([facility])    [address]  [address]  [facility] [address]   [phone]            Oct 31, 2018  9:00 AM CDT   Individual Therapy 53+ minutes with Nathalie Baldwin PhD   [facility] ([facility])    [address]  [address]  [facility] [address]   [phone]            Nov 15, 2018  8:00 AM CST   Adult Med Follow UP with Loly Driscoll MD   Psychiatry Clinic ([facility])    [address]  [address] [phone]              Who to contact     Please call your clinic at [phone] to:    Ask questions about your health    Make or cancel appointments    Discuss your medicines    Learn about your test results    Speak to your doctor            Additional Information About Your Visit        Montalvo Systemshart Information     Rebellet gives you secure access to your electronic health record. If you see a primary care provider, you can also send messages to your care team and make appointments. If you have questions, please call your primary care clinic.  If you do not have a primary care provider, please call [phone] and they will assist you.      Vhoto is an electronic gateway that provides easy, online access to your medical records. With Vhoto, you can request a clinic appointment, read your test results, renew a prescription or communicate with your care team.     To access your existing account, please contact your [facility] or call [phone] for assistance.        Care EveryWhere ID     This is your Care EveryWhere ID. This could be used by other organizations to access your [place] medical records  [id number]         Blood Pressure from Last 3 Encounters:   08/03/18 118/80   06/22/18 118/65   03/06/18 116/76    Weight from Last 3 Encounters:   08/03/18 88.1 kg (194 lb 3.2 oz)   06/22/18 88.3 kg (194 lb 9.6 oz)   03/06/18 87.2 kg (192 lb 3.2 oz)              We Performed the Following     Individual Psychotherapy (53+ min) [23[address]]          Today's Medication Changes          These changes are accurate as of 8/1/18 11:59 PM.  If you have any questions, ask your nurse or doctor.               These medicines have changed or have updated prescriptions.        Dose/Directions    hydrOXYzine 25 MG capsule   Commonly known as:  VISTARIL   This may have changed:  reasons  "to take this   Used for:  Gender identity disorder        Dose:  25 mg   Take 1 capsule (25 mg) by mouth every 6 hours as needed for itching   Quantity:  30 capsule   Refills:  0                Primary Care Provider Fax #    [facility] [phone]       [facility] [address]  [facility] [address]        Equal Access to Services     COLUMBA TONY : Hadii radha diaz hadevono Somarieali, waaxda luqadaha, qaybta kaalmada malikaracelykomal, kim sharp dontrellcathy neilkimberlyaugustine dubois . So [facility] [phone].    ATENCIÓN: Si habla español, tiene a loera disposición servicios gratuitos de asistencia lingüística. Leanna al [phone].    We comply with applicable federal civil rights laws and [place] laws. We do not discriminate on the basis of race, color, national origin, age, disability, sex, sexual orientation, or gender identity.            Thank you!     Thank you for choosing [place] [facility]  for your care. Our goal is always to provide you with excellent care. Hearing back from our patients is one way we can continue to improve our services. Please take a few minutes to complete the written survey that you may receive in the mail after your visit with us. Thank you!             Your Updated Medication List - Protect others around you: Learn how to safely use, store and throw away your medicines at www.disposemymeds.org.          This list is accurate as of 8/1/18 11:59 PM.  Always use your most recent med list.                   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       hydrOXYzine 25 MG capsule    VISTARIL    30 capsule    Take 1 capsule (25 mg) by mouth every 6 hours as needed for itching    Gender identity disorder       syringe/needle (disp) 25G X 1\" 1 ML Misc     10 each    To use with " weekly IM injection    Gender identity disorder in adolescents or adults

## 2018-08-03 ENCOUNTER — OFFICE VISIT (OUTPATIENT)
Dept: PSYCHIATRY | Facility: CLINIC | Age: 28
End: 2018-08-03
Attending: PSYCHIATRY & NEUROLOGY
Payer: COMMERCIAL

## 2018-08-03 ENCOUNTER — TELEPHONE (OUTPATIENT)
Dept: PSYCHIATRY | Facility: CLINIC | Age: 28
End: 2018-08-03

## 2018-08-03 VITALS
WEIGHT: 194.2 LBS | HEART RATE: 101 BPM | BODY MASS INDEX: 27.86 KG/M2 | DIASTOLIC BLOOD PRESSURE: 80 MMHG | SYSTOLIC BLOOD PRESSURE: 118 MMHG

## 2018-08-03 DIAGNOSIS — F41.0 PANIC ATTACKS: ICD-10-CM

## 2018-08-03 DIAGNOSIS — F31.81 BIPOLAR 2 DISORDER (H): Primary | ICD-10-CM

## 2018-08-03 DIAGNOSIS — F43.10 PTSD (POST-TRAUMATIC STRESS DISORDER): ICD-10-CM

## 2018-08-03 DIAGNOSIS — F41.8 DEPRESSION WITH ANXIETY: ICD-10-CM

## 2018-08-03 PROCEDURE — G0463 HOSPITAL OUTPT CLINIC VISIT: HCPCS | Mod: ZF

## 2018-08-03 RX ORDER — GABAPENTIN 300 MG/1
CAPSULE ORAL
Qty: 150 CAPSULE | Refills: 2 | Status: SHIPPED | OUTPATIENT
Start: 2018-08-03 | End: 2018-11-02

## 2018-08-03 RX ORDER — LAMOTRIGINE 100 MG/1
200 TABLET ORAL DAILY
Qty: 60 TABLET | Refills: 2 | Status: SHIPPED | OUTPATIENT
Start: 2018-08-03 | End: 2018-10-31

## 2018-08-03 RX ORDER — VENLAFAXINE HYDROCHLORIDE 150 MG/1
300 CAPSULE, EXTENDED RELEASE ORAL DAILY
Qty: 60 CAPSULE | Refills: 2 | Status: SHIPPED | OUTPATIENT
Start: 2018-08-03 | End: 2018-11-02

## 2018-08-03 ASSESSMENT — PAIN SCALES - GENERAL: PAINLEVEL: NO PAIN (0)

## 2018-08-03 NOTE — TELEPHONE ENCOUNTER
On 8/3/2018 the patient signed an ADILSON authorizing medical records to be exchanged between [facility] and [facility]  for the purpose of continuing care and coordination of care. I faxed the request to [phone].  I also sent the request to medical records via the tube system and kept a copy in psychiatry until scanning is complete.  Chata Estrada MA
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
## 2018-08-03 NOTE — PATIENT INSTRUCTIONS
- increase lamotrigine to 200mg daily (can go back to 150mg if having adverse effects - please call us if you do)  - continue other medications without change  - follow-up in 3 months  - please go to lab today for lab draw (we will MyChart results to you)

## 2018-08-03 NOTE — MR AVS SNAPSHOT
After Visit Summary   8/3/2018    Parveen Kaplan    MRN: [id number]           Patient Information     Date Of Birth          1990        Visit Information        Provider Department      8/3/2018 2:40 PM Loly Driscoll MD Psychiatry Clinic        Today's Diagnoses     Bipolar 2 disorder (H)    -  1    Depression with anxiety        Panic attacks        PTSD (post-traumatic stress disorder)          Care Instructions    - increase lamotrigine to 200mg daily (can go back to 150mg if having adverse effects - please call us if you do)  - continue other medications without change  - follow-up in 3 months  - please go to lab today for lab draw (we will MyChart results to you)            Follow-ups after your visit        Follow-up notes from your care team     Return in about 3 months (around 11/3/2018).      Your next 10 appointments already scheduled     Aug 20, 2018  3:00 PM CDT   Individual Therapy 53+ minutes with Nathalie Baldwin PhD   [facility]    [address]  [address]  [facility] [address]   [phone]            Sep 05, 2018  8:00 AM CDT   Individual Therapy 53+ minutes with Nathalie Baldwin PhD   [facility]    [address]  [address]  [facility] [address]   [phone]            Sep 19, 2018  4:00 PM CDT   Individual Therapy 53+ minutes with Ntahalie Baldwin PhD   [facility]    [address]  [address]  [facility] [address]   [phone]            Oct 03, 2018  8:00 AM CDT   Individual Therapy 53+ minutes with Nathalie Baldwin PhD   [facility]    [address]  [address]  [facility] [address]   [phone]            Oct 17, 2018  8:00 AM CDT   Individual Therapy 53+ minutes with Nathalie Baldwin PhD   [facility]    [address] [facility] [address]  [address]  [facility] [address]   [phone]            Oct 31, 2018  9:00 AM CDT   Individual Therapy 53+ minutes with Nathalie Baldwin, PhD   [facility] ([facility])    [address]  [address]  [facility] [address]   [phone]            Nov 15, 2018  8:00 AM CST   Adult Med Follow UP with Loly Driscoll MD   [facility]ry Clinic ([facility])    [place] [id number]  [address] [id number] [phone]              Who to contact     Please call your clinic at [phone] to:    Ask questions about your health    Make or cancel appointments    Discuss your medicines    Learn about your test results    Speak to your doctor            Additional Information About Your Visit        [employer] Information     [employer] gives you secure access to your electronic health record. If you see a primary care provider, you can also send messages to your care team and make appointments. If you have questions, please call your primary care clinic.  If you do not have a primary care provider, please call [phone] and they will assist you.      [employer] is an electronic gateway that provides easy, online access to your medical records. With [employer], you can request a clinic appointment, read your test results, renew a prescription or communicate with your care team.     To access your existing account, please contact your [facility] or call [phone] for assistance.        Care EveryWhere ID     This is your Care EveryWhere ID. This could be used by other organizations to access your [place] medical records  [id number]        Your Vitals Were     Pulse BMI (Body Mass Index)                101 27.86 kg/m2           Blood Pressure from Last 3 Encounters:   08/03/18 118/80   06/22/18 118/65   03/06/18 116/76    Weight from Last 3 Encounters:   08/03/18 88.1 kg (194 lb 3.2 oz)    06/22/18 88.3 kg (194 lb 9.6 oz)   03/06/18 87.2 kg (192 lb 3.2 oz)                 Today's Medication Changes          These changes are accurate as of 8/3/18 11:59 PM.  If you have any questions, ask your nurse or doctor.               These medicines have changed or have updated prescriptions.        Dose/Directions    hydrOXYzine 25 MG capsule   Commonly known as:  VISTARIL   This may have changed:  reasons to take this   Used for:  Gender identity disorder        Dose:  25 mg   Take 1 capsule (25 mg) by mouth every 6 hours as needed for itching   Quantity:  30 capsule   Refills:  0       lamoTRIgine 100 MG tablet   Commonly known as:  LAMICTAL   This may have changed:  how much to take   Used for:  Depression with anxiety   Changed by:  Loly Driscoll MD        Dose:  200 mg   Take 2 tablets (200 mg) by mouth daily   Quantity:  60 tablet   Refills:  2            Where to get your medicines      These medications were sent to [facility] [address] [place] [address] [facility]  [address] [facility] [address] [facility]  [address] [facility] [address]     Phone:  [phone]     gabapentin 300 MG capsule    lamoTRIgine 100 MG tablet    venlafaxine 150 MG 24 hr capsule                Primary Care Provider Fax #    [facility] [phone]       [address] [place] [address] [facility] [address]        Equal Access to Services     COLUMBA TONY AH: Hadii radha christianson Sojerman, waaxda lujohny, qaybta kaalmakomal liu, kim dubois . So [facility] [phone].    ATENCIÓN: Si habla español, tiene a loera disposición servicios gratuitos de asistencia lingüística. Leanna al [phone].    We comply with applicable federal civil rights laws and [place] laws. We do not discriminate on the basis of race, color, national origin, age, disability, sex, sexual orientation, or gender identity.            Thank you!     Thank you for choosing PSYCHIATRY CLINIC  for your care.  "Our goal is always to provide you with excellent care. Hearing back from our patients is one way we can continue to improve our services. Please take a few minutes to complete the written survey that you may receive in the mail after your visit with us. Thank you!             Your Updated Medication List - Protect others around you: Learn how to safely use, store and throw away your medicines at www.disposemymeds.org.          This list is accurate as of 8/3/18 11:59 PM.  Always use your most recent med list.                   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       gabapentin 300 MG capsule    NEURONTIN    150 capsule    Take 900 mg in the morning, 300 mg twice a day as needed for anxiety for a total daily dose of 1500 mg    Depression with anxiety       hydrOXYzine 25 MG capsule    VISTARIL    30 capsule    Take 1 capsule (25 mg) by mouth every 6 hours as needed for itching    Gender identity disorder       lamoTRIgine 100 MG tablet    LAMICTAL    60 tablet    Take 2 tablets (200 mg) by mouth daily    Depression with anxiety       syringe/needle (disp) 25G X 1\" 1 ML Misc     10 each    To use with weekly IM injection    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

## 2018-08-04 ASSESSMENT — PATIENT HEALTH QUESTIONNAIRE - PHQ9: SUM OF ALL RESPONSES TO PHQ QUESTIONS 1-9: 11

## 2018-08-14 NOTE — PROGRESS NOTES
I did not personally see the patient. I reviewed and agree with the assessment and plan of this note.       Betty Hill, PhD, LP  Licensed Psychologist
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
## 2018-09-05 ENCOUNTER — OFFICE VISIT (OUTPATIENT)
Dept: OTHER | Facility: OUTPATIENT CENTER | Age: 28
End: 2018-09-05
Payer: COMMERCIAL

## 2018-09-05 DIAGNOSIS — F64.0 GENDER DYSPHORIA IN ADOLESCENT AND ADULT: ICD-10-CM

## 2018-09-05 DIAGNOSIS — F43.10 POSTTRAUMATIC STRESS DISORDER: Primary | ICD-10-CM

## 2018-09-05 NOTE — MR AVS SNAPSHOT
After Visit Summary   9/5/2018    Parveen Kaplan    MRN: [id number]           Patient Information     Date Of Birth          1990        Visit Information        Provider Department      9/5/2018 8:00 AM Nathalie Baldwin PhD [facility]        Today's Diagnoses     Posttraumatic stress disorder    -  1    Gender dysphoria in adolescent and adult           Follow-ups after your visit        Your next 10 appointments already scheduled     Oct 03, 2018  8:00 AM CDT   Individual Therapy 53+ minutes with Nathalie Badlwin PhD   [facility] ([facility])    [address]  Mail [address] 75[address]  [facility] [address]   [phone]            Oct 15, 2018  8:00 AM CDT   Individual Therapy 53+ minutes with Nathalie Baldwin PhD   [facility] ([facility])    [address]  [address]  [facility] [address]   [phone]            Oct 31, 2018  9:00 AM CDT   Individual Therapy 53+ minutes with Nathalie Baldwin PhD   [facility] ([facility])    [address]  Mail [address]  [facility] [address]   [phone]            Nov 14, 2018  8:00 AM CST   Individual Therapy 53+ minutes with Nathalie Baldwin PhD   [facility] ([facility])    [address]  Mail [address]  [facility] [address]   [phone]            Nov 15, 2018  8:00 AM CST   Adult Med Follow UP with Loly Driscoll MD   [facility] ([facility])    [address]  [address] [address]   [phone]            Nov 28, 2018  9:00 AM CST   Individual Therapy 53+ minutes with Nathalie Baldwin PhD   [facility] ([facility])    [address]  [address]  [facility] [address]   [phone]              Who to contact     Please call your clinic at [phone] to:    Ask questions about your health    Make or cancel  appointments    Discuss your medicines    Learn about your test results    Speak to your doctor            Additional Information About Your Visit        [employer] Information     [employer] gives you secure access to your electronic health record. If you see a primary care provider, you can also send messages to your care team and make appointments. If you have questions, please call your primary care clinic.  If you do not have a primary care provider, please call [phone] and they will assist you.      [employer] is an electronic gateway that provides easy, online access to your medical records. With [employer], you can request a clinic appointment, read your test results, renew a prescription or communicate with your care team.     To access your existing account, please contact your [facility] or call [phone] for assistance.        Care EveryWhere ID     This is your Care EveryWhere ID. This could be used by other organizations to access your [place] medical records  [id number]         Blood Pressure from Last 3 Encounters:   08/03/18 118/80   06/22/18 118/65   03/06/18 116/76    Weight from Last 3 Encounters:   08/03/18 88.1 kg (194 lb 3.2 oz)   06/22/18 88.3 kg (194 lb 9.6 oz)   03/06/18 87.2 kg (192 lb 3.2 oz)              We Performed the Following     Individual Psychotherapy (53+ min) [35085]          Today's Medication Changes          These changes are accurate as of 9/5/18 11:59 PM.  If you have any questions, ask your nurse or doctor.               These medicines have changed or have updated prescriptions.        Dose/Directions    hydrOXYzine 25 MG capsule   Commonly known as:  VISTARIL   This may have changed:  reasons to take this   Used for:  Gender identity disorder        Dose:  25 mg   Take 1 capsule (25 mg) by mouth every 6 hours as needed for itching   Quantity:  30 capsule   Refills:  0                Primary Care Provider Fax #    [facility]  [phone]       [address] [place] [phone]        Equal Access to Services     COLUMBA TONY : Hadii aad ku hadevonanthony Karen, jenyda baldomargeha, emmanuelle wahl vinhpiakomal, waxkenya aron dontrellcathy neilkimberlyaugustine marques. So [facility] [phone].    ATENCIÓN: Si habla español, tiene a loera disposición servicios gratuitos de asistencia lingüística. Leanna al [phone].    We comply with applicable federal civil rights laws and [place] laws. We do not discriminate on the basis of race, color, national origin, age, disability, sex, sexual orientation, or gender identity.            Thank you!     Thank you for choosing [facility]  for your care. Our goal is always to provide you with excellent care. Hearing back from our patients is one way we can continue to improve our services. Please take a few minutes to complete the written survey that you may receive in the mail after your visit with us. Thank you!             Your Updated Medication List - Protect others around you: Learn how to safely use, store and throw away your medicines at www.disposemymeds.org.          This list is accurate as of 9/5/18 11:59 PM.  Always use your most recent med list.                   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       gabapentin 300 MG capsule    NEURONTIN    150 capsule    Take 900 mg in the morning, 300 mg twice a day as needed for anxiety for a total daily dose of 1500 mg    Depression with anxiety       hydrOXYzine 25 MG capsule    VISTARIL    30 capsule    Take 1 capsule (25 mg) by mouth every 6 hours as needed for itching    Gender identity disorder       lamoTRIgine 100 MG tablet    LAMICTAL    60 tablet    Take 2 tablets (200 mg) by mouth daily    Depression with anxiety        "syringe/needle (disp) 25G X 1\" 1 ML Misc     10 each    To use with weekly IM injection    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

## 2018-09-19 ENCOUNTER — OFFICE VISIT (OUTPATIENT)
Dept: OTHER | Facility: OUTPATIENT CENTER | Age: 28
End: 2018-09-19
Payer: COMMERCIAL

## 2018-09-19 DIAGNOSIS — F64.0 GENDER DYSPHORIA IN ADOLESCENT AND ADULT: Primary | ICD-10-CM

## 2018-09-19 DIAGNOSIS — F43.10 POSTTRAUMATIC STRESS DISORDER: ICD-10-CM

## 2018-09-19 NOTE — MR AVS SNAPSHOT
After Visit Summary   9/19/2018    Parveen Kaplan    MRN: [id number]           Patient Information     Date Of Birth          1990        Visit Information        Provider Department      9/19/2018 4:00 PM Nathalie [facility] PhD [facility]        Today's Diagnoses     Gender dysphoria in adolescent and adult    -  1    Posttraumatic stress disorder           Follow-ups after your visit        Your next 10 appointments already scheduled     Oct 31, 2018  9:00 AM CDT   Individual Therapy 53+ minutes with Nathalie Baldwin PhD   [facility] ([facility])    [address]  Mail [address] 7521  [facility] [address]   [phone]            Nov 14, 2018  8:00 AM CST   Individual Therapy 53+ minutes with Nathalie Baldwin PhD   [facility] ([facility])    [address]  [address]  [facility] [address]   [phone]            Nov 15, 2018  8:00 AM CST   Adult Med Follow UP with Loly Driscoll MD   Psychiatry Clinic ([facility])    [place] [id number]  [address] [place] [address]   [phone]            Nov 28, 2018  9:00 AM CST   Individual Therapy 53+ minutes with Nathalie Baldwin PhD   [facility] ([facility])    [address]  [address]  [facility] [address]   [phone]            Dec 12, 2018 10:00 AM CST   Individual Therapy 53+ minutes with Nathalie Baldwin PhD   [facility] ([facility])    [address]  [address]  [facility] [address]   [phone]            Dec 27, 2018  8:00 AM CST   Individual Therapy 53+ minutes with Nathalie Baldwin PhD   [facility] ([facility])    [address]  [address]  [facility] [address]   [phone]              Who to contact     Please call your clinic at [phone] to:    Ask questions about your health    Make or cancel  appointments    Discuss your medicines    Learn about your test results    Speak to your doctor            Additional Information About Your Visit        [employer] Information     [employer] gives you secure access to your electronic health record. If you see a primary care provider, you can also send messages to your care team and make appointments. If you have questions, please call your primary care clinic.  If you do not have a primary care provider, please call [phone] and they will assist you.      [employer] is an electronic gateway that provides easy, online access to your medical records. With [employer], you can request a clinic appointment, read your test results, renew a prescription or communicate with your care team.     To access your existing account, please contact your [facility] or call [phone] for assistance.        Care EveryWhere ID     This is your Care EveryWhere ID. This could be used by other organizations to access your [place] medical records  [id number]         Blood Pressure from Last 3 Encounters:   08/03/18 118/80   06/22/18 118/65   03/06/18 116/76    Weight from Last 3 Encounters:   08/03/18 88.1 kg (194 lb 3.2 oz)   06/22/18 88.3 kg (194 lb 9.6 oz)   03/06/18 87.2 kg (192 lb 3.2 oz)              We Performed the Following     Individual Psychotherapy (53+ min) [17873]     Mental Health Tx Plan Scan (HIM Scan)          Today's Medication Changes          These changes are accurate as of 9/19/18 11:59 PM.  If you have any questions, ask your nurse or doctor.               These medicines have changed or have updated prescriptions.        Dose/Directions    hydrOXYzine 25 MG capsule   Commonly known as:  VISTARIL   This may have changed:  reasons to take this   Used for:  Gender identity disorder        Dose:  25 mg   Take 1 capsule (25 mg) by mouth every 6 hours as needed for itching   Quantity:  30 capsule   Refills:  0                Primary Care  Provider Fax #    [facility] [phone]       [address] [place] [address] [address]  [facility] [phone]        Equal Access to Services     COLUMBA TONY : Janis Jones, jenykomal blandmargeha, felipaclaritza pulidosebastienkomal justicepeyton, kim guyin hayaacathy justicechristian colón sherly marques. So [facility] [phone].    ATENCIÓN: Si habla español, tiene a loera disposición servicios gratuitos de asistencia lingüística. Llame al [phone].    We comply with applicable federal civil rights laws and [place] laws. We do not discriminate on the basis of race, color, national origin, age, disability, sex, sexual orientation, or gender identity.            Thank you!     Thank you for choosing [facility]  for your care. Our goal is always to provide you with excellent care. Hearing back from our patients is one way we can continue to improve our services. Please take a few minutes to complete the written survey that you may receive in the mail after your visit with us. Thank you!             Your Updated Medication List - Protect others around you: Learn how to safely use, store and throw away your medicines at www.disposemymeds.org.          This list is accurate as of 9/19/18 11:59 PM.  Always use your most recent med list.                   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       gabapentin 300 MG capsule    NEURONTIN    150 capsule    Take 900 mg in the morning, 300 mg twice a day as needed for anxiety for a total daily dose of 1500 mg    Depression with anxiety       hydrOXYzine 25 MG capsule    VISTARIL    30 capsule    Take 1 capsule (25 mg) by mouth every 6 hours as needed for itching    Gender identity disorder       lamoTRIgine 100 MG tablet    LAMICTAL    60 tablet    Take 2 tablets (200 mg) by mouth  "daily    Depression with anxiety       syringe/needle (disp) 25G X 1\" 1 ML Misc     10 each    To use with weekly IM injection    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

## 2018-09-23 NOTE — PROGRESS NOTES
I did not personally see the patient.  I reviewed and agree with the assessment and plan as documented in this note.     Betty Desai, PhD, LMFT
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
## 2018-09-28 ENCOUNTER — MEDICAL CORRESPONDENCE (OUTPATIENT)
Dept: HEALTH INFORMATION MANAGEMENT | Facility: CLINIC | Age: 28
End: 2018-09-28
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
## 2018-10-15 ENCOUNTER — OFFICE VISIT (OUTPATIENT)
Dept: OTHER | Facility: OUTPATIENT CENTER | Age: 28
End: 2018-10-15
Payer: COMMERCIAL

## 2018-10-15 DIAGNOSIS — F43.10 POSTTRAUMATIC STRESS DISORDER: Primary | ICD-10-CM

## 2018-10-15 DIAGNOSIS — F64.0 GENDER DYSPHORIA IN ADOLESCENT AND ADULT: ICD-10-CM
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
## 2018-10-15 NOTE — MR AVS SNAPSHOT
After Visit Summary   10/15/2018    Parveen Kaplan    MRN: [id number]           Patient Information     Date Of Birth          1990        Visit Information        Provider Department      10/15/2018 8:00 AM Nathalie Baldwin, PhD [facility]        Today's Diagnoses     Posttraumatic stress disorder    -  1    Gender dysphoria in adolescent and adult           Follow-ups after your visit        Your next 10 appointments already scheduled     Nov 14, 2018  8:00 AM CST   Individual Therapy 53+ minutes with Nathalie Baldwin PhD   [facility] ([facility])    [address]  Mail [address] 7521  [facility] [address]   [phone]            Nov 15, 2018  8:00 AM CST   Adult Med Follow UP with Loly Driscoll MD   Psychiatry Clinic ([facility])    [place] [id number]  [address] [address]   [phone]            Nov 28, 2018  9:00 AM CST   Individual Therapy 53+ minutes with Nathalie Baldwin PhD   [facility] ([facility])    [address]  Mail [address] 7521  [facility] [address]   [phone]            Dec 12, 2018 10:00 AM CST   Individual Therapy 53+ minutes with Nathalie Baldwin PhD   [facility] ([facility])    [address]  Mail Code 7521  [facility] [address]   [phone]            Dec 27, 2018  8:00 AM CST   Individual Therapy 53+ minutes with Nathalie Baldwin PhD   [facility] ([facility])    [address]  Mail [address] 7521  [facility] [address]   [phone]              Who to contact     Please call your clinic at [phone] to:    Ask questions about your health    Make or cancel appointments    Discuss your medicines    Learn about your test results    Speak to your doctor            Additional Information About Your Visit        MyChart Information     [employer] gives you secure access to your electronic health  record. If you see a primary care provider, you can also send messages to your care team and make appointments. If you have questions, please call your primary care clinic.  If you do not have a primary care provider, please call [phone] and they will assist you.      [employer] is an electronic gateway that provides easy, online access to your medical records. With [employer], you can request a clinic appointment, read your test results, renew a prescription or communicate with your care team.     To access your existing account, please contact your [facility] or call [phone] for assistance.        Care EveryWhere ID     This is your Care EveryWhere ID. This could be used by other organizations to access your [place] medical records  [id number]         Blood Pressure from Last 3 Encounters:   08/03/18 118/80   06/22/18 118/65   03/06/18 116/76    Weight from Last 3 Encounters:   08/03/18 88.1 kg (194 lb 3.2 oz)   06/22/18 88.3 kg (194 lb 9.6 oz)   03/06/18 87.2 kg (192 lb 3.2 oz)              We Performed the Following     Individual Psychotherapy (38-52 min) [35057]          Today's Medication Changes          These changes are accurate as of 10/15/18 11:59 PM.  If you have any questions, ask your nurse or doctor.               These medicines have changed or have updated prescriptions.        Dose/Directions    hydrOXYzine 25 MG capsule   Commonly known as:  VISTARIL   This may have changed:  reasons to take this   Used for:  Gender identity disorder        Dose:  25 mg   Take 1 capsule (25 mg) by mouth every 6 hours as needed for itching   Quantity:  30 capsule   Refills:  0                Primary Care Provider Fax #    [facility] [phone]       [facility] [address] [facility] [address]        Equal Access to Services     COLUMBA TONY AH: saul Morrow, kim keenan  "la'aan ah. So [facility] [phone].    ATENCIÓN: Si rachella daxa, tiene a loera disposición servicios gratuitos de asistencia lingüística. Leanna al [phone].    We comply with applicable federal civil rights laws and [place] laws. We do not discriminate on the basis of race, color, national origin, age, disability, sex, sexual orientation, or gender identity.            Thank you!     Thank you for choosing [facility]  for your care. Our goal is always to provide you with excellent care. Hearing back from our patients is one way we can continue to improve our services. Please take a few minutes to complete the written survey that you may receive in the mail after your visit with us. Thank you!             Your Updated Medication List - Protect others around you: Learn how to safely use, store and throw away your medicines at www.disposemymeds.org.          This list is accurate as of 10/15/18 11:59 PM.  Always use your most recent med list.                   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       gabapentin 300 MG capsule    NEURONTIN    150 capsule    Take 900 mg in the morning, 300 mg twice a day as needed for anxiety for a total daily dose of 1500 mg    Depression with anxiety       hydrOXYzine 25 MG capsule    VISTARIL    30 capsule    Take 1 capsule (25 mg) by mouth every 6 hours as needed for itching    Gender identity disorder       lamoTRIgine 100 MG tablet    LAMICTAL    60 tablet    Take 2 tablets (200 mg) by mouth daily    Depression with anxiety       syringe/needle (disp) 25G X 1\" 1 ML Misc     10 each    To use with weekly IM injection    Gender identity disorder in adolescents or adults       venlafaxine 150 MG 24 hr capsule    EFFEXOR-XR    60 capsule    Take 2 capsules (300 " mg) by mouth daily    Depression with anxiety, Panic attacks

## 2018-10-17 ENCOUNTER — TELEPHONE (OUTPATIENT)
Dept: PSYCHIATRY | Facility: CLINIC | Age: 28
End: 2018-10-17

## 2018-10-17 NOTE — TELEPHONE ENCOUNTER
-Writer received incoming fax of hand-written letter from the pt. The pt is requesting a letter from the provider stating she needs the following accommodations:    1. The allowance to wear headphones to reduce anxiety    2. Extra breaks to manage symptoms    3. If the pt is unable to calm after taking emergency medications, he is allowed to go home without negative impact to his work attendance.   (Pt is willing to put a cap on this in terms of how many excused days he's allow a month)    -Message routed to provider and original request placed in provider's folder for review.

## 2018-10-31 DIAGNOSIS — F41.8 DEPRESSION WITH ANXIETY: ICD-10-CM

## 2018-10-31 RX ORDER — LAMOTRIGINE 100 MG/1
200 TABLET ORAL DAILY
Qty: 60 TABLET | Refills: 0 | Status: SHIPPED | OUTPATIENT
Start: 2018-10-31 | End: 2018-11-15

## 2018-10-31 NOTE — TELEPHONE ENCOUNTER
Medication requested:  lamoTRIgine (LAMICTAL) 100 MG   Last refilled: 10/1/18  Qty: 60    Last seen: 8/3/18  RTC: 3 MOS  Cancel: 0  No-show: 0  Next appt: 11/15/18  Refill decision:  Refilled for 30 days per protocol

## 2018-11-02 DIAGNOSIS — F41.8 DEPRESSION WITH ANXIETY: ICD-10-CM

## 2018-11-02 DIAGNOSIS — F41.0 PANIC ATTACKS: ICD-10-CM

## 2018-11-02 RX ORDER — VENLAFAXINE HYDROCHLORIDE 150 MG/1
300 CAPSULE, EXTENDED RELEASE ORAL DAILY
Qty: 60 CAPSULE | Refills: 2 | Status: SHIPPED | OUTPATIENT
Start: 2018-11-02 | End: 2018-11-15

## 2018-11-02 RX ORDER — GABAPENTIN 300 MG/1
CAPSULE ORAL
Qty: 150 CAPSULE | Refills: 0 | Status: SHIPPED | OUTPATIENT
Start: 2018-11-02 | End: 2018-11-15

## 2018-11-02 NOTE — TELEPHONE ENCOUNTER
Medication requested: VENLAFAXINE   Last refilled: 10/1/2018  Qty: 60    Medication requested: Gabapentin  Last refilled: 10/1/2018  Qty: 150    Last seen: 8/3/2018  RTC:  3 months  Cancel: 0  No-show: 0  Next appt: 11/15/2018    Refill decision:   Refilled for 30 days per protocol.

## 2018-11-14 ENCOUNTER — OFFICE VISIT (OUTPATIENT)
Dept: OTHER | Facility: OUTPATIENT CENTER | Age: 28
End: 2018-11-14
Payer: COMMERCIAL

## 2018-11-14 DIAGNOSIS — F64.0 GENDER DYSPHORIA IN ADOLESCENT AND ADULT: ICD-10-CM

## 2018-11-14 DIAGNOSIS — F43.10 POSTTRAUMATIC STRESS DISORDER: Primary | ICD-10-CM
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
## 2018-11-14 NOTE — MR AVS SNAPSHOT
After Visit Summary   11/14/2018    Parveen Kaplan    MRN: [id number]           Patient Information     Date Of Birth          1990        Visit Information        Provider Department      11/14/2018 8:00 AM Nathalie Baldwin PhD [facility]        Today's Diagnoses     Posttraumatic stress disorder    -  1    Gender dysphoria in adolescent and adult           Follow-ups after your visit        Your next 10 appointments already scheduled     Nov 28, 2018  9:00 AM CST   Individual Therapy 53+ minutes with Nathalie Baldwin PhD   [facility] ([facility])    [address]  Mail [address] 7521  [facility] [address]   [phone]            Dec 12, 2018 10:00 AM CST   Individual Therapy 53+ minutes with Nathalie Baldwin PhD   [facility] ([facility])    [address]  [address] 75[address]  [facility] [address]   [phone]            Dec 27, 2018  8:00 AM CST   Individual Therapy 53+ minutes with Nathalie Baldwin PhD   [facility] ([facility])    [address]  Mail [address] 7521  [facility] [address]   [phone]            Jan 09, 2019  8:00 AM CST   Individual Therapy 53+ minutes with Nathalie Baldwin PhD   [facility] ([facility])    [address]  [address]  [facility] [address]   [phone]            Jan 23, 2019  8:00 AM CST   Individual Therapy 53+ minutes with Nathalie Baldwin PhD   [facility] ([facility])    [address]  Mail [address] 7521  [facility] [address]   [phone]            Feb 14, 2019  8:00 AM CST   Adult Med Follow UP with Loly Driscoll MD   Psychiatry Clinic ([facility])    [address]  [address] [id number] [phone]              Who to contact     Please call your clinic at [phone] to:    Ask questions about your health    Make or cancel  appointments    Discuss your medicines    Learn about your test results    Speak to your doctor            Additional Information About Your Visit        [employer] Information     [employer] gives you secure access to your electronic health record. If you see a primary care provider, you can also send messages to your care team and make appointments. If you have questions, please call your primary care clinic.  If you do not have a primary care provider, please call [phone] and they will assist you.      [employer] is an electronic gateway that provides easy, online access to your medical records. With [employer], you can request a clinic appointment, read your test results, renew a prescription or communicate with your care team.     To access your existing account, please contact your [facility] or call [phone] for assistance.        Care EveryWhere ID     This is your Care EveryWhere ID. This could be used by other organizations to access your [place] medical records  [id number]         Blood Pressure from Last 3 Encounters:   11/19/18 117/64   11/15/18 110/76   08/03/18 118/80    Weight from Last 3 Encounters:   11/15/18 88 kg (194 lb)   08/03/18 88.1 kg (194 lb 3.2 oz)   06/22/18 88.3 kg (194 lb 9.6 oz)              We Performed the Following     Individual Psychotherapy (53+ min) [90313]          Today's Medication Changes          These changes are accurate as of 11/14/18 11:59 PM.  If you have any questions, ask your nurse or doctor.               These medicines have changed or have updated prescriptions.        Dose/Directions    hydrOXYzine 25 MG capsule   Commonly known as:  VISTARIL   This may have changed:  reasons to take this   Used for:  Gender identity disorder        Dose:  25 mg   Take 1 capsule (25 mg) by mouth every 6 hours as needed for itching   Quantity:  30 capsule   Refills:  0                Primary Care Provider Fax #    [facility]  "[phone]       [address] [place] [address] [address].  [facility] [address]        Equal Access to Services     COLUMBA TONY : Hadii aad ku hadjennifer Jones, jenyda yosef, emmanuelle liu, kim colón laMichellekatja marques. So [facility] [phone].    ATENCIÓN: Si habla español, tiene a loera disposición servicios gratuitos de asistencia lingüística. Juan Joséame al [phone].    We comply with applicable federal civil rights laws and [place] laws. We do not discriminate on the basis of race, color, national origin, age, disability, sex, sexual orientation, or gender identity.            Thank you!     Thank you for choosing [place] [facility]  for your care. Our goal is always to provide you with excellent care. Hearing back from our patients is one way we can continue to improve our services. Please take a few minutes to complete the written survey that you may receive in the mail after your visit with us. Thank you!             Your Updated Medication List - Protect others around you: Learn how to safely use, store and throw away your medicines at www.disposemymeds.org.          This list is accurate as of 11/14/18 11:59 PM.  Always use your most recent med list.                   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       hydrOXYzine 25 MG capsule    VISTARIL    30 capsule    Take 1 capsule (25 mg) by mouth every 6 hours as needed for itching    Gender identity disorder       syringe/needle (disp) 25G X 1\" 1 ML Misc     10 each    To use with weekly IM injection    Gender identity disorder in adolescents or adults         "

## 2018-11-15 ENCOUNTER — OFFICE VISIT (OUTPATIENT)
Dept: PSYCHIATRY | Facility: CLINIC | Age: 28
End: 2018-11-15
Attending: PSYCHIATRY & NEUROLOGY
Payer: COMMERCIAL

## 2018-11-15 VITALS
BODY MASS INDEX: 27.84 KG/M2 | WEIGHT: 194 LBS | DIASTOLIC BLOOD PRESSURE: 76 MMHG | SYSTOLIC BLOOD PRESSURE: 110 MMHG | HEART RATE: 73 BPM

## 2018-11-15 DIAGNOSIS — F64.0 GENDER IDENTITY DISORDER IN ADOLESCENTS OR ADULTS: ICD-10-CM

## 2018-11-15 DIAGNOSIS — F64.9 GENDER IDENTITY DISORDER: ICD-10-CM

## 2018-11-15 DIAGNOSIS — F41.0 PANIC ATTACKS: ICD-10-CM

## 2018-11-15 DIAGNOSIS — F41.8 DEPRESSION WITH ANXIETY: ICD-10-CM

## 2018-11-15 PROCEDURE — G0463 HOSPITAL OUTPT CLINIC VISIT: HCPCS | Mod: ZF

## 2018-11-15 RX ORDER — LAMOTRIGINE 200 MG/1
200 TABLET ORAL DAILY
Qty: 30 TABLET | Refills: 2 | Status: SHIPPED | OUTPATIENT
Start: 2018-11-15 | End: 2019-02-13

## 2018-11-15 RX ORDER — HYDROXYZINE PAMOATE 25 MG/1
25 CAPSULE ORAL EVERY 6 HOURS PRN
Qty: 30 CAPSULE | Refills: 0 | Status: CANCELLED | OUTPATIENT
Start: 2018-11-15

## 2018-11-15 RX ORDER — GABAPENTIN 300 MG/1
CAPSULE ORAL
Qty: 150 CAPSULE | Refills: 0 | Status: SHIPPED | OUTPATIENT
Start: 2018-11-15 | End: 2019-01-17

## 2018-11-15 RX ORDER — VENLAFAXINE HYDROCHLORIDE 150 MG/1
300 CAPSULE, EXTENDED RELEASE ORAL DAILY
Qty: 60 CAPSULE | Refills: 2 | Status: SHIPPED | OUTPATIENT
Start: 2018-11-15 | End: 2019-02-13

## 2018-11-15 ASSESSMENT — PAIN SCALES - GENERAL: PAINLEVEL: NO PAIN (0)

## 2018-11-15 ASSESSMENT — PATIENT HEALTH QUESTIONNAIRE - PHQ9: SUM OF ALL RESPONSES TO PHQ QUESTIONS 1-9: 7

## 2018-11-15 NOTE — PATIENT INSTRUCTIONS
- continue current medications  - consider calling your insurance company to see if they will reimburse gym membership costs  - continue to practice sleep hygiene skills  - return to clinic in 3 months. Call or [employer] sooner if questions/concerns arise.       Thank you for coming to the PSYCHIATRY CLINIC.    Lab Testing:  If you had lab testing today and your results are reassuring or normal they will be mailed to you or sent through [employer] within 7 days.   If the lab tests need quick action we will call you with the results.  The phone number we will call with results is # [phone] (home) . If this is not the best number please call our clinic and change the number.    Medication Refills:  If you need any refills please call your pharmacy and they will contact us. Our fax number for refills is [phone]. Please allow three business for refill processing.   If you need to  your refill at a new pharmacy, please contact the new pharmacy directly. The new pharmacy will help you get your medications transferred.     Scheduling:  If you have any concerns about today's visit or wish to schedule another appointment please call our office during normal business hours [phone] (8-5:00 M-F)    Contact Us:  Please call [phone] during business hours (8-5:00 M-F).  If after clinic hours, or on the weekend, please call  [phone].    Financial Assistance [phone]  [employer] Billing [phone]  [facility]illing [phone]  Medical Records [phone]      MENTAL HEALTH CRISIS NUMBERS:  [facility]:   [facility] - [phone]   Crisis Residence [facility] - [phone]   Walk-In Counseling Center [facility] - [phone]   COPE [facility]/7 [facility] Mobile Team for Adults - [[phone]]; Child - [[phone]]     Crisis Connection - [phone]     [facility]:   [facility] - [phone]   Walk-in counseling [facility]  [phone]   Walk-in counseling [facility] - [phone]   Crisis Residence [facility] - [phone]   Urgent Care Adult Mental Health:   --Drop-in, 24/7 crisis line, [facility] Mobile Team [[phone]]    CRISIS TEXT LINE: Text [phone] from anywhere, anytime, any crisis 24/7;    OR SEE www.crisistextline.org     Poison Control Center - 1-738.555.3606    CHILD: [facility] needs assessment team - [phone]     Trans Lifeline - [phone]; or [employer] Lifeline - 1-677.836.3932    If you have a medical emergency please call 911or go to the nearest ER.                    _____________________________________________    Again thank you for choosing PSYCHIATRY CLINIC and please let us know how we can best partner with you to improve you and your family's health.  You may be receiving a survey in the mail regarding this appointment. We would love to have your feedback, both positive and negative, so please fill out the survey and return it using the provided envelope. The survey is done by an external company, so your answers are anonymous.

## 2018-11-15 NOTE — MR AVS SNAPSHOT
After Visit Summary   11/15/2018    Parveen Kaplan    MRN: [id number]           Patient Information     Date Of Birth          1990        Visit Information        Provider Department      11/15/2018 8:00 AM Loly Driscoll MD [facility]        Today's Diagnoses     Depression with anxiety        Panic attacks        Gender identity disorder        Gender identity disorder in adolescents or adults          Care Instructions    - continue current medications  - consider calling your insurance company to see if they will reimburse gym membership costs  - continue to practice sleep hygiene skills  - return to clinic in 3 months. Call or linkedÃ¼hart sooner if questions/concerns arise.       Thank you for coming to the PSYCHIATRY CLINIC.    Lab Testing:  If you had lab testing today and your results are reassuring or normal they will be mailed to you or sent through [employer] within 7 days.   If the lab tests need quick action we will call you with the results.  The phone number we will call with results is # [phone] (home) . If this is not the best number please call our clinic and change the number.    Medication Refills:  If you need any refills please call your pharmacy and they will contact us. Our fax number for refills is [phone]. Please allow three business for refill processing.   If you need to  your refill at a new pharmacy, please contact the new pharmacy directly. The new pharmacy will help you get your medications transferred.     Scheduling:  If you have any concerns about today's visit or wish to schedule another appointment please call our office during normal business hours [phone] (8-5:00 M-F)    Contact Us:  Please call [phone] during business hours (8-5:00 M-F).  If after clinic hours, or on the weekend, please call  [phone].    Financial Assistance [phone]  [employer] Billing [phone]  [facility] Billing [phone]  Medical Records  [phone]      MENTAL HEALTH CRISIS NUMBERS:  [facility]:   [facility] - [phone]   Crisis Residence [facility] - [phone]   Walk-In Counseling Center [facility] - [phone]   COPE 24/7 [facility] Mobile Team for Adults - [[phone]]; Child - [[phone]]     Crisis Connection - [phone]     [facility] - [phone]   Walk-in counseling [facility] - [phone]   Walk-in counseling [facility] - [phone]   Crisis Residence [facility] - [phone]   Urgent Care Adult Mental Health:   --Drop-in, 24/7 crisis line, and [facility] Mobile Team [[phone]]    CRISIS TEXT LINE: Text [phone] from anywhere, anytime, any crisis 24/7;    OR SEE www.crisistextline.org     Poison Control Center - [phone]    CHILD: [facility] needs assessment team - [phone]     [facility] Lifeline - [phone]; or [employer] Lifeline - [phone]    If you have a medical emergency please call 911or go to the nearest ER.                    _____________________________________________    Again thank you for choosing PSYCHIATRY CLINIC and please let us know how we can best partner with you to improve you and your family's health.  You may be receiving a survey in the mail regarding this appointment. We would love to have your feedback, both positive and negative, so please fill out the survey and return it using the provided envelope. The survey is done by an external company, so your answers are anonymous.             Follow-ups after your visit        Follow-up notes from your care team     Return in about 3 months (around 2/15/2019).      Your next 10 appointments already scheduled     Nov 28, 2018  9:00 AM CST   Individual Therapy 53+ minutes with Nathalie Baldwin, PhD   [facility])    [address] [place] [address]  Mail Code 6775  [facility]  [address]   [phone]            Dec 12, 2018 10:00 AM CST   Individual Therapy 53+ minutes with Nathalie Baldwin PhD   [facility] ([facility])    [address]  Mail Code 7521  [facility] [address]   [phone]            Dec 27, 2018  8:00 AM CST   Individual Therapy 53+ minutes with Nathalie Baldwin PhD   [facility] ([facility])    [address]  Mail Code 7521  [facility] [address]   [phone]            Jan 09, 2019  8:00 AM CST   Individual Therapy 53+ minutes with Nathalie Baldwin PhD   [facility] ([facility])    [address]  Mail Code 7521  [facility] [address]   [phone]            Jan 23, 2019  8:00 AM CST   Individual Therapy 53+ minutes with Nathalie Baldwin PhD   [facility] ([facility])    [address]  Mail Code 7521  [facility] [address]   [phone]            Feb 14, 2019  8:00 AM CST   Adult Med Follow UP with Loly Driscoll MD   [facility] ([facility])    [address]  [address] [id number] [phone]              Who to contact     Please call your clinic at [phone] to:    Ask questions about your health    Make or cancel appointments    Discuss your medicines    Learn about your test results    Speak to your doctor            Additional Information About Your Visit        [employer] Information     [employer] gives you secure access to your electronic health record. If you see a primary care provider, you can also send messages to your care team and make appointments. If you have questions, please call your primary care clinic.  If you do not have a primary care provider, please call [phone] and they will assist you.      [employer] is an electronic gateway that provides easy, online access to your medical records. With [employer], you can request a clinic appointment, read your test results, renew a  prescription or communicate with your care team.     To access your existing account, please contact your [facility] or call [phone] for assistance.        Care EveryWhere ID     This is your Care EveryWhere ID. This could be used by other organizations to access your [place] medical records  [id number]        Your Vitals Were     Pulse BMI (Body Mass Index)                73 27.84 kg/m2           Blood Pressure from Last 3 Encounters:   11/15/18 110/76   08/03/18 118/80   06/22/18 118/65    Weight from Last 3 Encounters:   11/15/18 88 kg (194 lb)   08/03/18 88.1 kg (194 lb 3.2 oz)   06/22/18 88.3 kg (194 lb 9.6 oz)              Today, you had the following     No orders found for display         Today's Medication Changes          These changes are accurate as of 11/15/18  8:35 AM.  If you have any questions, ask your nurse or doctor.               These medicines have changed or have updated prescriptions.        Dose/Directions    hydrOXYzine 25 MG capsule   Commonly known as:  VISTARIL   This may have changed:  reasons to take this   Used for:  Gender identity disorder        Dose:  25 mg   Take 1 capsule (25 mg) by mouth every 6 hours as needed for itching   Quantity:  30 capsule   Refills:  0       lamoTRIgine 200 MG tablet   Commonly known as:  LAMICTAL   This may have changed:  medication strength   Used for:  Depression with anxiety   Changed by:  Loly Driscoll MD        Dose:  200 mg   Take 1 tablet (200 mg) by mouth daily   Quantity:  30 tablet   Refills:  2            Where to get your medicines      [facility] [address] [place], [place] [address] [address] AT [facility] OF  & [employer]  [address], [facility] [address]     Phone:  [phone]     gabapentin 300 MG capsule    lamoTRIgine 200 MG tablet    venlafaxine 150 MG 24 hr capsule                Primary Care Provider Fax #    Located within [facility]  [phone]       [address] [place] [address]        Equal Access to Services     COLUMBA TONY : Hadii aad ku hadevonanthony Karen, jenyda baldomargeha, emmanuelle wahl vinhpeyton, kim guyin hayaacathy justicechristian rashaadadriel sherly marques. So [facility] [phone].    ATENCIÓN: Si habla español, tiene a loera disposición servicios gratuitos de asistencia lingüística. Leanna al [phone].    We comply with applicable federal civil rights laws and [place] laws. We do not discriminate on the basis of race, color, national origin, age, disability, sex, sexual orientation, or gender identity.            Thank you!     Thank you for choosing PS[facility]ATRY CLINIC  for your care. Our goal is always to provide you with excellent care. Hearing back from our patients is one way we can continue to improve our services. Please take a few minutes to complete the written survey that you may receive in the mail after your visit with us. Thank you!             Your Updated Medication List - Protect others around you: Learn how to safely use, store and throw away your medicines at www.disposemymeds.org.          This list is accurate as of 11/15/18  8:35 AM.  Always use your most recent med list.                   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       gabapentin 300 MG capsule    NEURONTIN    150 capsule    Take 900 mg in the morning, 300 mg twice a day as needed for anxiety for a total daily dose of 1500 mg    Depression with anxiety       hydrOXYzine 25 MG capsule    VISTARIL    30 capsule    Take 1 capsule (25 mg) by mouth every 6 hours as needed for itching    Gender identity disorder       lamoTRIgine 200 MG tablet    LAMICTAL    30 tablet    Take 1 tablet (200 mg) by mouth daily    Depression with anxiety        "syringe/needle (disp) 25G X 1\" 1 ML Misc     10 each    To use with weekly IM injection    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

## 2018-11-19 ENCOUNTER — RADIANT APPOINTMENT (OUTPATIENT)
Dept: GENERAL RADIOLOGY | Facility: CLINIC | Age: 28
End: 2018-11-19
Attending: FAMILY MEDICINE
Payer: COMMERCIAL

## 2018-11-19 ENCOUNTER — OFFICE VISIT (OUTPATIENT)
Dept: URGENT CARE | Facility: URGENT CARE | Age: 28
End: 2018-11-19
Payer: COMMERCIAL

## 2018-11-19 VITALS
HEART RATE: 79 BPM | OXYGEN SATURATION: 96 % | TEMPERATURE: 97.9 F | SYSTOLIC BLOOD PRESSURE: 117 MMHG | DIASTOLIC BLOOD PRESSURE: 64 MMHG

## 2018-11-19 DIAGNOSIS — M79.645 PAIN OF FINGER OF LEFT HAND: ICD-10-CM

## 2018-11-19 DIAGNOSIS — M79.645 PAIN OF FINGER OF LEFT HAND: Primary | ICD-10-CM

## 2018-11-19 PROCEDURE — 99213 OFFICE O/P EST LOW 20 MIN: CPT | Performed by: FAMILY MEDICINE

## 2018-11-19 PROCEDURE — 73140 X-RAY EXAM OF FINGER(S): CPT | Mod: LT

## 2018-11-19 NOTE — MR AVS SNAPSHOT
After Visit Summary   11/19/2018    Parveen Kaplan    MRN: [id number]           Patient Information     Date Of Birth          1990        Visit Information        Provider Department      11/19/2018 7:05 PM Parker Gilbert MD [facility]        Today's Diagnoses     Pain of finger of left hand    -  1       Follow-ups after your visit        Follow-up notes from your care team     Return if symptoms worsen or fail to improve.      Your next 10 appointments already scheduled     Nov 28, 2018  9:00 AM CST   Individual Therapy 53+ minutes with Nathalie Baldwin PhD   [facility] ([facility])    [address]  Mail Code 7521  [facility] [address]   [phone]            Dec 12, 2018 10:00 AM CST   Individual Therapy 53+ minutes with Nathalie Baldwin PhD   [facility] ([facility])    [address]  Mail Code 7521  [facility] [address]   [phone]            Dec 27, 2018  8:00 AM CST   Individual Therapy 53+ minutes with Nathalie Baldwin PhD   [facility] ([facility])    [address]  Mail Code 75[address]  [facility] [address]   [phone]            Jan 09, 2019  8:00 AM CST   Individual Therapy 53+ minutes with Nathalie Baldwin PhD   [facility] ([facility])    [address]  Mail Code 7521  [facility] [address]   [phone]            Jan 23, 2019  8:00 AM CST   Individual Therapy 53+ minutes with Nathalie Baldwin PhD   [facility] ([facility])    [address]  Mail Code 7521  [facility] [address]   [phone]            Feb 14, 2019  8:00 AM CST   Adult Med Follow UP with Loly Driscoll MD   Psychiatry Clinic ([facility])    [address]  [address] [id number] [phone]              Who to contact     If you have questions or need follow up information about  today's clinic visit or your schedule please contact [facility] directly at [phone].  Normal or non-critical lab and imaging results will be communicated to you by [employer], letter or phone within 4 business days after the clinic has received the results. If you do not hear from us within 7 days, please contact the clinic through [employer] or phone. If you have a critical or abnormal lab result, we will notify you by phone as soon as possible.  Submit refill requests through [employer] or call your pharmacy and they will forward the refill request to us. Please allow 3 business days for your refill to be completed.          Additional Information About Your Visit        [employer] Information     [employer] gives you secure access to your electronic health record. If you see a primary care provider, you can also send messages to your care team and make appointments. If you have questions, please call your primary care clinic.  If you do not have a primary care provider, please call [phone] and they will assist you.        Care EveryWhere ID     This is your Care EveryWhere ID. This could be used by other organizations to access your [place] medical records  [id number]        Your Vitals Were     Pulse Temperature Pulse Oximetry             79 97.9  F (36.6  C) (Oral) 96%          Blood Pressure from Last 3 Encounters:   11/19/18 117/64   12/06/16 110/60   11/30/16 121/60    Weight from Last 3 Encounters:   12/06/16 188 lb 3.2 oz (85.4 kg)   11/30/16 188 lb 15 oz (85.7 kg)   11/29/16 188 lb 9.6 oz (85.5 kg)                 Today's Medication Changes          These changes are accurate as of 11/19/18  8:07 PM.  If you have any questions, ask your nurse or doctor.               These medicines have changed or have updated prescriptions.        Dose/Directions    hydrOXYzine 25 MG capsule   Commonly known as:  VISTARIL   This may have changed:  reasons to take this   Used for:  Gender identity  disorder        Dose:  25 mg   Take 1 capsule (25 mg) by mouth every 6 hours as needed for itching   Quantity:  30 capsule   Refills:  0                Primary Care Provider Fax #    [facility] [phone]       [facility] [address]  [facility] [address]        Equal Access to Services     COLUMBA TONY : Hadii aad ku hadevonanthony Somarieali, waaxda luqadaha, qaybta kaalmada adeegyada, kim neilkimberlyaugustine marques. So [facility] [phone].    ATENCIÓN: Si habla español, tiene a loera disposición servicios gratuitos de asistencia lingüística. Llame al [phone].    We comply with applicable federal civil rights laws and [place] laws. We do not discriminate on the basis of race, color, national origin, age, disability, sex, sexual orientation, or gender identity.            Thank you!     Thank you for choosing [facility]  for your care. Our goal is always to provide you with excellent care. Hearing back from our patients is one way we can continue to improve our services. Please take a few minutes to complete the written survey that you may receive in the mail after your visit with us. Thank you!             Your Updated Medication List - Protect others around you: Learn how to safely use, store and throw away your medicines at www.disposemymeds.org.          This list is accurate as of 11/19/18  8:07 PM.  Always use your most recent med list.                   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       gabapentin 300 MG capsule    NEURONTIN    150 capsule    Take 900 mg in the morning, 300 mg twice a day as needed for anxiety for a total daily dose of 1500 mg    Depression with anxiety       hydrOXYzine 25 MG capsule    VISTARIL    30 capsule    Take 1  "capsule (25 mg) by mouth every 6 hours as needed for itching    Gender identity disorder       lamoTRIgine 200 MG tablet    LAMICTAL    30 tablet    Take 1 tablet (200 mg) by mouth daily    Depression with anxiety       syringe/needle (disp) 25G X 1\" 1 ML Misc     10 each    To use with weekly IM injection    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

## 2018-11-20 NOTE — PROGRESS NOTES
"SUBJECTIVE:  Chief Complaint   Patient presents with     Urgent Care     Finger     left index finger injury this morning     Parveen Kaplan is a 28 year old female who presents with a chief complaint of left finger  second pain and tenderness.  Symptoms began 4 hour(s) ago, are moderate and sudden onset  Context:  Injury:Yes: jammed it.  R handed  Pain exacerbated by flexion/extension Relieved by rest and ice.  She treated it initially with Tylenol. This is the first time this type of injury has occurred to this patient.     No past medical history on file.  Current Outpatient Prescriptions   Medication Sig Dispense Refill     gabapentin (NEURONTIN) 300 MG capsule Take 900 mg in the morning, 300 mg twice a day as needed for anxiety for a total daily dose of 1500 mg 150 capsule 0     hydrOXYzine (VISTARIL) 25 MG capsule Take 1 capsule (25 mg) by mouth every 6 hours as needed for itching (Patient taking differently: Take 25 mg by mouth every 6 hours as needed for anxiety ) 30 capsule 0     lamoTRIgine (LAMICTAL) 200 MG tablet Take 1 tablet (200 mg) by mouth daily 30 tablet 2     syringe/needle, disp, 25G X 1\" 1 ML MISC To use with weekly IM injection 10 each 3     testosterone cypionate (DEPO-TESTOSTERONE) 200 MG/ML injection Inject 0.35 mLs (70 mg) into the muscle once a week 2 mL 1     venlafaxine (EFFEXOR-XR) 150 MG 24 hr capsule Take 2 capsules (300 mg) by mouth daily 60 capsule 2     Social History   Substance Use Topics     Smoking status: Never Smoker     Smokeless tobacco: Never Used     Alcohol use Not on file       ROS:  CONSTITUTIONAL:NEGATIVE for fever, chills, change in weight  INTEGUMENTARY/SKIN: NEGATIVE for worrisome rashes, moles or lesions    EXAM:   /64  Pulse 79  Temp 97.9  F (36.6  C) (Oral)  SpO2 96%  M/S Exam:finger  second tenderness to palpation and decreased ROM at both joints   GENERAL APPEARANCE: healthy, alert and no distress  EXTREMITIES: peripheral pulses normal  SKIN: no " suspicious lesions or rashes  NEURO: Normal strength and tone, sensory exam grossly normal, mentation intact and speech normal    X-RAY was done.  I have personally reviewed the images and find nothing acute.   Radiology to review xrays and I will communicate new findings     ASSESSMENT:  1. Pain of finger of left hand  Symptomatic cares were discussed in detail.  Pt instructed to come back to the clinic for worsening sx or persistent pain x 2 weeks  - XR Finger Left G/E 2 Views; Future

## 2018-11-28 ENCOUNTER — OFFICE VISIT (OUTPATIENT)
Dept: OTHER | Facility: OUTPATIENT CENTER | Age: 28
End: 2018-11-28
Payer: COMMERCIAL

## 2018-11-28 DIAGNOSIS — F64.0 GENDER DYSPHORIA IN ADOLESCENT AND ADULT: Primary | ICD-10-CM

## 2018-11-28 DIAGNOSIS — F43.10 PTSD (POST-TRAUMATIC STRESS DISORDER): ICD-10-CM
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
## 2018-12-26 NOTE — TELEPHONE ENCOUNTER
Medication requested: LAMOTRIGINE 100 MG two tablets once daily.  Last refilled: 10/31/2018  Qty: 60  New script sent for lamotrigine 200 mg tablets    lamoTRIgine (LAMICTAL) 200 MG tablet 30 tablet 2 11/15/2018  No   Sig - Route: Take 1 tablet (200 mg) by mouth daily - Oral   Sent to pharmacy as: lamoTRIgine (LAMICTAL) 200 MG tablet         Last seen: 11/15/2018  RTC:  3 months  Cancel: 0  No-show: 0  Next appt: None for MFU, recent 12/3/2018 cancellation by provider via [facility] of appt 2/14/1959 has not been read. Will ask Scheduling to contact the pt for r/s  Appointment.  Faxed new order to [facility] [address]

## 2018-12-27 ENCOUNTER — OFFICE VISIT (OUTPATIENT)
Dept: OTHER | Facility: OUTPATIENT CENTER | Age: 28
End: 2018-12-27
Payer: COMMERCIAL

## 2018-12-27 DIAGNOSIS — F64.0 GENDER DYSPHORIA IN ADOLESCENT AND ADULT: ICD-10-CM

## 2018-12-27 DIAGNOSIS — F43.10 POSTTRAUMATIC STRESS DISORDER: Primary | ICD-10-CM
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
## 2019-01-09 ENCOUNTER — TELEPHONE (OUTPATIENT)
Dept: OTHER | Facility: OUTPATIENT CENTER | Age: 29
End: 2019-01-09

## 2019-01-09 NOTE — TELEPHONE ENCOUNTER
Therapist left voicemail requesting client to return call regarding missed appointment. In the message, therapist reminded client of [facility] policy about getting charged a fee for no shows.    TRINI Baldwin, PhD LP

## 2019-01-17 DIAGNOSIS — F41.8 DEPRESSION WITH ANXIETY: ICD-10-CM

## 2019-01-17 RX ORDER — GABAPENTIN 300 MG/1
CAPSULE ORAL
Qty: 150 CAPSULE | Refills: 0 | Status: SHIPPED | OUTPATIENT
Start: 2019-01-17 | End: 2019-02-11

## 2019-01-18 NOTE — TELEPHONE ENCOUNTER
Medication requested: gabapentin 300mg cap  Last refilled: 12/14/18  Qty: 150      Last seen: 11/15/18  RTC: 3 months  Cancel: 0  No-show: 0  Next appt: 2/5/19    Refill decision:   Refilled for 30 days per protocol.

## 2019-01-23 ENCOUNTER — OFFICE VISIT (OUTPATIENT)
Dept: OTHER | Facility: OUTPATIENT CENTER | Age: 29
End: 2019-01-23
Payer: COMMERCIAL

## 2019-01-23 DIAGNOSIS — F64.0 GENDER DYSPHORIA IN ADOLESCENT AND ADULT: ICD-10-CM

## 2019-01-23 DIAGNOSIS — F43.10 POSTTRAUMATIC STRESS DISORDER: Primary | ICD-10-CM
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
## 2019-02-05 ENCOUNTER — TELEPHONE (OUTPATIENT)
Dept: PSYCHIATRY | Facility: CLINIC | Age: 29
End: 2019-02-05

## 2019-02-05 NOTE — TELEPHONE ENCOUNTER
Called patient as he did not show for his appointment today. LVM encouraging him to call and reschedule.    Pantera Driscoll MD  PGY-3 Psychiatry Resident

## 2019-02-11 DIAGNOSIS — F41.8 DEPRESSION WITH ANXIETY: ICD-10-CM

## 2019-02-12 ENCOUNTER — TELEPHONE (OUTPATIENT)
Dept: OTHER | Facility: OUTPATIENT CENTER | Age: 29
End: 2019-02-12

## 2019-02-12 DIAGNOSIS — F41.0 PANIC ATTACKS: ICD-10-CM

## 2019-02-12 DIAGNOSIS — F41.8 DEPRESSION WITH ANXIETY: ICD-10-CM

## 2019-02-12 RX ORDER — GABAPENTIN 300 MG/1
CAPSULE ORAL
Qty: 150 CAPSULE | Refills: 0 | Status: SHIPPED | OUTPATIENT
Start: 2019-02-12 | End: 2019-03-12

## 2019-02-12 NOTE — TELEPHONE ENCOUNTER
Medication requested: gabapentin (NEURONTIN) 300 MG capsule  Last refilled: 1/18/19  Qty: 150      Last seen: 11/15/18  RTC: 3 months  Cancel: 0  No-show: 1  Next appt: 3/8/19    Refill decision:   Refill pended and routed to the provider for review/determination due to NO show x 1  Pt outside of RTC timeframe.

## 2019-02-13 RX ORDER — LAMOTRIGINE 200 MG/1
200 TABLET ORAL DAILY
Qty: 30 TABLET | Refills: 0 | Status: SHIPPED | OUTPATIENT
Start: 2019-02-13 | End: 2019-03-12

## 2019-02-13 RX ORDER — VENLAFAXINE HYDROCHLORIDE 150 MG/1
CAPSULE, EXTENDED RELEASE ORAL
Qty: 60 CAPSULE | Refills: 0 | Status: SHIPPED | OUTPATIENT
Start: 2019-02-13 | End: 2019-03-18

## 2019-02-13 NOTE — TELEPHONE ENCOUNTER
Medication requested: lamoTRIgine (LAMICTAL) 200 MG tablet    Last refilled: not given  Qty: 30    Medication requested: Effexor 150 MG cap  Last refilled: 1/14/19  Qty: 60      Last seen: 11/15/18  RTC: 3 months  Cancel: 0  No-show: 1  Next appt: 3/8/19     Refill decision:   Refill pended and routed to the provider for review/determination due to NO show x 1  Pt outside of RTC timeframe.

## 2019-02-20 ENCOUNTER — TELEPHONE (OUTPATIENT)
Dept: OTHER | Facility: OUTPATIENT CENTER | Age: 29
End: 2019-02-20

## 2019-02-20 NOTE — TELEPHONE ENCOUNTER
Patient calls leaving message on my voice mail canceling today's appointment due to weather.      Rossana Beasley,CMA

## 2019-03-06 ENCOUNTER — OFFICE VISIT (OUTPATIENT)
Dept: OTHER | Facility: OUTPATIENT CENTER | Age: 29
End: 2019-03-06
Payer: COMMERCIAL

## 2019-03-06 DIAGNOSIS — F64.0 GENDER DYSPHORIA IN ADOLESCENT AND ADULT: ICD-10-CM

## 2019-03-06 DIAGNOSIS — F43.10 POSTTRAUMATIC STRESS DISORDER: Primary | ICD-10-CM
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
## 2019-03-12 DIAGNOSIS — F41.8 DEPRESSION WITH ANXIETY: ICD-10-CM

## 2019-03-13 RX ORDER — GABAPENTIN 300 MG/1
CAPSULE ORAL
Qty: 150 CAPSULE | Refills: 0 | Status: SHIPPED | OUTPATIENT
Start: 2019-03-13 | End: 2019-03-18

## 2019-03-13 RX ORDER — LAMOTRIGINE 200 MG/1
200 TABLET ORAL DAILY
Qty: 30 TABLET | Refills: 0 | Status: SHIPPED | OUTPATIENT
Start: 2019-03-13 | End: 2019-03-18

## 2019-03-13 NOTE — TELEPHONE ENCOUNTER
Medication requested: lamoTRIgine (LAMICTAL) 200 MG tablet    Last refilled:2/13/19  Qty: 30     Medication requested: gabapentin (NEURONTIN) 300 MG capsule  Last refilled: 2/12/19  Qty: 150      Last seen: 11/15/18  RTC: 3 months  Cancel: 1  No-show: 1  Next appt: 3/8/19     Refill decision:   Refill pended and routed to the provider for review/determination due to   NO show x 1  CANCEL X 1  Pt outside of RTC timeframe.

## 2019-03-18 ENCOUNTER — OFFICE VISIT (OUTPATIENT)
Dept: PSYCHIATRY | Facility: CLINIC | Age: 29
End: 2019-03-18
Attending: PSYCHIATRY & NEUROLOGY
Payer: COMMERCIAL

## 2019-03-18 VITALS
WEIGHT: 199.4 LBS | SYSTOLIC BLOOD PRESSURE: 115 MMHG | DIASTOLIC BLOOD PRESSURE: 76 MMHG | BODY MASS INDEX: 28.61 KG/M2 | HEART RATE: 87 BPM

## 2019-03-18 DIAGNOSIS — F41.0 PANIC ATTACKS: ICD-10-CM

## 2019-03-18 DIAGNOSIS — F41.8 DEPRESSION WITH ANXIETY: ICD-10-CM

## 2019-03-18 DIAGNOSIS — F64.9 GENDER IDENTITY DISORDER: ICD-10-CM

## 2019-03-18 PROCEDURE — G0463 HOSPITAL OUTPT CLINIC VISIT: HCPCS | Mod: ZF

## 2019-03-18 RX ORDER — LAMOTRIGINE 200 MG/1
200 TABLET ORAL DAILY
Qty: 30 TABLET | Refills: 2 | Status: SHIPPED | OUTPATIENT
Start: 2019-03-18 | End: 2019-08-05

## 2019-03-18 RX ORDER — VENLAFAXINE HYDROCHLORIDE 150 MG/1
CAPSULE, EXTENDED RELEASE ORAL
Qty: 60 CAPSULE | Refills: 2 | Status: SHIPPED | OUTPATIENT
Start: 2019-03-18 | End: 2019-08-05

## 2019-03-18 RX ORDER — GABAPENTIN 300 MG/1
CAPSULE ORAL
Qty: 120 CAPSULE | Refills: 2 | Status: SHIPPED | OUTPATIENT
Start: 2019-03-18 | End: 2019-08-28

## 2019-03-18 RX ORDER — HYDROXYZINE PAMOATE 25 MG/1
25 CAPSULE ORAL EVERY 6 HOURS PRN
Qty: 30 CAPSULE | Refills: 0 | Status: SHIPPED | OUTPATIENT
Start: 2019-03-18 | End: 2019-07-19

## 2019-03-18 ASSESSMENT — PAIN SCALES - GENERAL: PAINLEVEL: NO PAIN (0)

## 2019-03-18 ASSESSMENT — PATIENT HEALTH QUESTIONNAIRE - PHQ9: SUM OF ALL RESPONSES TO PHQ QUESTIONS 1-9: 13

## 2019-03-18 NOTE — PATIENT INSTRUCTIONS
- continue current medications without change  - return to clinic in 2-3 months. Consider getting on waitlist for psychiatrist at [facility]. If this does not work out, Dr. Driscoll can also ask our clinic  to help identify some possible clinics that fit your criteria    SAFETY PLAN    Completed on 3/18/19 at which time the following was reported: recent suicidal thoughts.  It was determined that ongoing outpatient care was appropriate in addition to establishing the following care plan:     1. Triggers which can possibly cause thoughts of self-harm:   Thoughts- feeling like no one else cares about me, feeling like I'm not making any progress in life  Situation-multiple highly stressful events happening at once       2. Coping strategies:  ice pack on face- reviewed  paced breathing- reviewed  progressive muscle relaxation- not discussed  intense exercise- not discussed  other- listening to calming music, reading, playing fun games, making a list     3. Ways to increase safety:  - talk to fiancee about making sure that weapons are locked away or hidden  - consider using a lockbox for medications    4. People I can call for help: (friends, family and/or professionals)    Name: Adan    Phone: (in phone)    Name: LIO (mobile crisis)  Phone: [phone]    Name: Loly Driscoll MD               Phone: [facility] [phone] (clinic)                                                                                                    [phone] (after hours)      Thank you for coming to the PS[facility]IC.    Lab Testing:  If you had lab testing today and your results are reassuring or normal they will be mailed to you or sent through [employer] within 7 days.   If the lab tests need quick action we will call you with the results.  The phone number we will call with results is # [phone] (home) . If this is not the best number please call our clinic and change the number.    Medication Refills:  If you need  any refills please call your pharmacy and they will contact us. Our fax number for refills is [phone]. Please allow three business for refill processing.   If you need to  your refill at a new pharmacy, please contact the new pharmacy directly. The new pharmacy will help you get your medications transferred.     Scheduling:  If you have any concerns about today's visit or wish to schedule another appointment please call our office during normal business hours [phone] (8-5:00 M-F)    Contact Us:  Please call [phone] during business hours (8-5:00 M-F).  If after clinic hours, or on the weekend, please call  [phone].    Financial Assistance [phone]  [facility] [phone]  [facility] [phone]  Medical Records [phone]      MENTAL HEALTH CRISIS NUMBERS:  [facility]:   [facility] - [phone]   [facility] - [phone]   Walk-In Counseling [facility] [phone]   [facility] 24/7 [facility] Mobile Team for Adults - [[phone]]; Child - [[phone]]        [facility]:   [facility] - [phone]   Walk-in counseling [facility] - [phone]   Walk-in counseling [facility] - [phone]   [facility] - [phone]   Urgent Care Adult Mental Health:   --Drop-in, 24/7 crisis line, and [facility] Mobile Team [[phone]]    CRISIS TEXT LINE: Text [phone] from anywhere, anytime, any crisis 24/7;    OR SEE www.crisistextline.org     Poison Control Center - [phone]    CHILD: [facility] needs assessment team - [phone]     [facility] Lifeline - [phone]; or [facility] Lifeline - [phone]    If you have a medical emergency please call 911or go to the nearest ER.                    _____________________________________________    Again thank you for choosing [facility] and please let us know how  we can best partner with you to improve you and your family's health.  You may be receiving a survey in the mail regarding this appointment. We would love to have your feedback, both positive and negative, so please fill out the survey and return it using the provided envelope. The survey is done by an external company, so your answers are anonymous.
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
## 2019-03-25 ENCOUNTER — TELEPHONE (OUTPATIENT)
Dept: PSYCHIATRY | Facility: CLINIC | Age: 29
End: 2019-03-25

## 2019-03-25 DIAGNOSIS — F64.9 GENDER IDENTITY DISORDER: Primary | ICD-10-CM

## 2019-03-25 RX ORDER — HYDROXYZINE HYDROCHLORIDE 25 MG/1
TABLET, FILM COATED ORAL
Qty: 30 TABLET | Refills: 0 | Status: SHIPPED | OUTPATIENT
Start: 2019-03-25 | End: 2019-08-28

## 2019-03-25 NOTE — TELEPHONE ENCOUNTER
Loly Driscoll MD   You 9 minutes ago (2:50 PM)      Yes, can substitute with hydrochloride formulation.     Thanks!   [place]    Routing Comment       You   Loly Driscoll MD; Jenn Childress RN 22 minutes ago (2:37 PM)      Hello,   Please see my note.   Thank you   [place]    Routing Comment

## 2019-03-25 NOTE — TELEPHONE ENCOUNTER
"Writer received a fax from [facility] on [facility] stating \"this medication is on back order can we change it to the HCL tablets? Please advise thanks\" Writer routed a message to Dr. Driscoll and Jenn Childress RN    "

## 2019-03-27 ENCOUNTER — TELEPHONE (OUTPATIENT)
Dept: OTHER | Facility: OUTPATIENT CENTER | Age: 29
End: 2019-03-27

## 2019-03-27 NOTE — TELEPHONE ENCOUNTER
Therapist called client to check-in, as he had not arrived for his scheduled appointment. Therapist left voicemail requesting client to return call. In the message, therapist reminded client of [facility] policy about getting charged a fee for no shows.     TRINI Baldwin, PhD LP

## 2019-04-10 ENCOUNTER — OFFICE VISIT (OUTPATIENT)
Dept: OTHER | Facility: OUTPATIENT CENTER | Age: 29
End: 2019-04-10
Payer: COMMERCIAL

## 2019-04-10 DIAGNOSIS — F64.0 GENDER DYSPHORIA IN ADOLESCENT AND ADULT: ICD-10-CM

## 2019-04-10 DIAGNOSIS — F43.10 POSTTRAUMATIC STRESS DISORDER: Primary | ICD-10-CM
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
## 2019-04-25 ENCOUNTER — OFFICE VISIT (OUTPATIENT)
Dept: OTHER | Facility: OUTPATIENT CENTER | Age: 29
End: 2019-04-25
Payer: COMMERCIAL

## 2019-04-25 DIAGNOSIS — F43.10 POSTTRAUMATIC STRESS DISORDER: Primary | ICD-10-CM

## 2019-04-25 DIAGNOSIS — F64.0 GENDER DYSPHORIA IN ADOLESCENT AND ADULT: ICD-10-CM
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
## 2019-05-16 ENCOUNTER — OFFICE VISIT (OUTPATIENT)
Dept: OTHER | Facility: OUTPATIENT CENTER | Age: 29
End: 2019-05-16

## 2019-05-16 DIAGNOSIS — F43.10 POSTTRAUMATIC STRESS DISORDER: ICD-10-CM

## 2019-05-16 DIAGNOSIS — F64.0 GENDER DYSPHORIA IN ADOLESCENT AND ADULT: Primary | ICD-10-CM
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
## 2019-05-22 ENCOUNTER — TELEPHONE (OUTPATIENT)
Dept: OTHER | Facility: OUTPATIENT CENTER | Age: 29
End: 2019-05-22

## 2019-05-22 NOTE — TELEPHONE ENCOUNTER
Omar leaves message 4/21 at 11 pm stating unable to keep today's appointment. Reason not given.      Rossana Beasley,CMA

## 2019-06-05 ENCOUNTER — OFFICE VISIT (OUTPATIENT)
Dept: OTHER | Facility: OUTPATIENT CENTER | Age: 29
End: 2019-06-05
Payer: COMMERCIAL

## 2019-06-05 DIAGNOSIS — F64.0 GENDER DYSPHORIA IN ADOLESCENT AND ADULT: ICD-10-CM

## 2019-06-05 DIAGNOSIS — F43.10 POSTTRAUMATIC STRESS DISORDER: Primary | ICD-10-CM

## 2019-06-19 ENCOUNTER — OFFICE VISIT (OUTPATIENT)
Dept: OTHER | Facility: OUTPATIENT CENTER | Age: 29
End: 2019-06-19
Payer: COMMERCIAL

## 2019-06-19 DIAGNOSIS — F64.0 GENDER DYSPHORIA IN ADOLESCENT AND ADULT: ICD-10-CM

## 2019-06-19 DIAGNOSIS — F43.10 POSTTRAUMATIC STRESS DISORDER: Primary | ICD-10-CM

## 2019-07-03 ENCOUNTER — OFFICE VISIT (OUTPATIENT)
Dept: OTHER | Facility: OUTPATIENT CENTER | Age: 29
End: 2019-07-03
Payer: COMMERCIAL

## 2019-07-03 DIAGNOSIS — F64.0 GENDER DYSPHORIA IN ADOLESCENT AND ADULT: ICD-10-CM

## 2019-07-03 DIAGNOSIS — F43.10 POSTTRAUMATIC STRESS DISORDER: Primary | ICD-10-CM
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
## 2019-07-06 ENCOUNTER — TRANSFERRED RECORDS (OUTPATIENT)
Dept: HEALTH INFORMATION MANAGEMENT | Facility: CLINIC | Age: 29
End: 2019-07-06

## 2019-07-08 ENCOUNTER — OFFICE VISIT (OUTPATIENT)
Dept: OTHER | Facility: OUTPATIENT CENTER | Age: 29
End: 2019-07-08
Payer: COMMERCIAL

## 2019-07-08 DIAGNOSIS — F64.0 GENDER DYSPHORIA IN ADOLESCENT AND ADULT: Primary | ICD-10-CM

## 2019-07-08 DIAGNOSIS — F43.10 POSTTRAUMATIC STRESS DISORDER: ICD-10-CM
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
## 2019-07-18 ENCOUNTER — TELEPHONE (OUTPATIENT)
Dept: PSYCHIATRY | Facility: CLINIC | Age: 29
End: 2019-07-18

## 2019-07-18 NOTE — TELEPHONE ENCOUNTER
Prior Authorization Retail Medication Request  RENEWAL  Medication/Dose: venlafaxine (EFFEXOR-XR) 150 MG 24 hr capsule  ICD code (if different than what is on RX):  F41.8 (Depression with anxiety)  F41.0 (Panic attacks)  Previously Tried and Failed:  Please see previous PA approval from encounter on 6/13/18  Rationale:  Please see PA approval from encounter on 6/13/18    Insurance Name:  [facility]  Insurance ID:  [id number]      Pharmacy Information (if different than what is on RX)  Name:    Phone:

## 2019-07-19 ENCOUNTER — OFFICE VISIT (OUTPATIENT)
Dept: FAMILY MEDICINE | Facility: CLINIC | Age: 29
End: 2019-07-19
Payer: COMMERCIAL

## 2019-07-19 VITALS
HEART RATE: 87 BPM | BODY MASS INDEX: 28.2 KG/M2 | SYSTOLIC BLOOD PRESSURE: 119 MMHG | OXYGEN SATURATION: 97 % | TEMPERATURE: 98.8 F | DIASTOLIC BLOOD PRESSURE: 76 MMHG | WEIGHT: 197 LBS | HEIGHT: 70 IN

## 2019-07-19 DIAGNOSIS — N30.01 ACUTE CYSTITIS WITH HEMATURIA: Primary | ICD-10-CM

## 2019-07-19 LAB
ALBUMIN UR-MCNC: 30 MG/DL
APPEARANCE UR: CLEAR
BACTERIA #/AREA URNS HPF: ABNORMAL /HPF
BILIRUB UR QL STRIP: NEGATIVE
COLOR UR AUTO: YELLOW
GLUCOSE UR STRIP-MCNC: NEGATIVE MG/DL
HGB UR QL STRIP: ABNORMAL
KETONES UR STRIP-MCNC: NEGATIVE MG/DL
LEUKOCYTE ESTERASE UR QL STRIP: ABNORMAL
NITRATE UR QL: NEGATIVE
NON-SQ EPI CELLS #/AREA URNS LPF: ABNORMAL /LPF
PH UR STRIP: 7.5 PH (ref 5–7)
RBC #/AREA URNS AUTO: ABNORMAL /HPF
SOURCE: ABNORMAL
SP GR UR STRIP: 1.01 (ref 1–1.03)
UROBILINOGEN UR STRIP-ACNC: 0.2 EU/DL (ref 0.2–1)
WBC #/AREA URNS AUTO: ABNORMAL /HPF

## 2019-07-19 PROCEDURE — 99213 OFFICE O/P EST LOW 20 MIN: CPT | Performed by: INTERNAL MEDICINE

## 2019-07-19 PROCEDURE — 81001 URINALYSIS AUTO W/SCOPE: CPT | Performed by: INTERNAL MEDICINE

## 2019-07-19 PROCEDURE — 87086 URINE CULTURE/COLONY COUNT: CPT | Performed by: INTERNAL MEDICINE

## 2019-07-19 RX ORDER — CIPROFLOXACIN 250 MG/1
250 TABLET, FILM COATED ORAL 2 TIMES DAILY
Qty: 10 TABLET | Refills: 0 | Status: SHIPPED | OUTPATIENT
Start: 2019-07-19 | End: 2019-08-30

## 2019-07-19 ASSESSMENT — MIFFLIN-ST. JEOR: SCORE: 1708.84

## 2019-07-19 NOTE — PROGRESS NOTES
"Subjective     Parveen Kaplan is a 29 year old female who presents to clinic today for the following health issues:    HPI     UTI  Patient has UTI for a couple days  Had UTI last week  Went to [facility] in [place] for this  Culture reports showed staphylococcus saprophyticus  Was given bactrim for 3 days  Last dose was 07/10  Now has symptoms again  Symptoms include dysuria, cloudy and dark urine color, and notes some hematuria  Patient is sexually active  Endorses they stay well hydrated and wipes front to back  Denies vaginal discharge    Depression  Depression is managed with venlafaxine  Follows with psychiatry at [facility]  Also follows with therapist   Mother and second brother has history of depression    Acne  Patient has cystic acne on face  This is due to testosterone injections  Has been meaning to see a dermatologist     Testosterone  S/p transgender mastectomy in 11/2016  They do not follow with endocrinologist  Patient gets testosterone injections through [facility]    Additional history: as documented    Medications and Labs reviewed in EPIC    Reviewed and updated as needed this visit by Provider         Review of Systems   ROS COMP: Constitutional, HEENT, cardiovascular, pulmonary, GI, , musculoskeletal, neuro, skin, endocrine and psych systems are negative, except as otherwise noted.    POSITIVE for dysuria, hematuria, dark colored urine, cloudy urine    This document serves as a record of the services and decisions personally performed and made by Gaby Duncan MD. It was created on her behalf by Marva Keene, a trained medical scribe. The creation of this document is based on the provider's statements to the medical scribe.  Marva Keene 2:21 PM July 19, 2019        Objective    /76   Temp 98.8  F (37.1  C) (Oral)   Ht 1.778 m (5' 10\")   Wt 89.4 kg (197 lb)   SpO2 97%   Breastfeeding? No   BMI 28.27 kg/m    Body mass index is 28.27 kg/m .    Physical Exam   GENERAL: " overweight, alert and no distress  PSYCH: mentation appears normal, affect normal/bright    Diagnostic Test Results:  Results for orders placed or performed in visit on 07/19/19 (from the past 24 hour(s))   *UA reflex to Microscopic and Culture ([facility] (except [place] and [place])   Result Value Ref Range    Color Urine Yellow     Appearance Urine Clear     Glucose Urine Negative NEG^Negative mg/dL    Bilirubin Urine Negative NEG^Negative    Ketones Urine Negative NEG^Negative mg/dL    Specific Gravity Urine 1.015 1.003 - 1.035    Blood Urine Trace (A) NEG^Negative    pH Urine 7.5 (H) 5.0 - 7.0 pH    Protein Albumin Urine 30 (A) NEG^Negative mg/dL    Urobilinogen Urine 0.2 0.2 - 1.0 EU/dL    Nitrite Urine Negative NEG^Negative    Leukocyte Esterase Urine Large (A) NEG^Negative    Source Midstream Urine    Urine Microscopic   Result Value Ref Range    WBC Urine 25-50 (A) OTO5^0 - 5 /HPF    RBC Urine 2-5 (A) OTO2^O - 2 /HPF    Squamous Epithelial /LPF Urine Moderate (A) FEW^Few /LPF    Bacteria Urine Few (A) NEG^Negative /HPF     Assessment & Plan     Parveen was seen today for uti.    Diagnoses and all orders for this visit:    Acute cystitis with hematuria  Patient complains of UTI symptoms  Symptoms include hematuria, dysuria, and dark cloudy urine  Was seen at [facility] in [place] for UTI last week  Culture was positive for staphylococcus saprophyticus  Was started on Bactrim for 3 days  Last dose of Bactrim was 07/10  Patient is sexually active  Reports they stay well hydrated  They wipe front to back  Urine labs were positive for infection  Will send sample for bacteria culture  Patient already tried bactrim and infection came back  Will have them try Cipro instead  Discussed SE of Cipro  Start Cipro per instructions  Stay well hydrated  Can also drink cranberry juice  -     *UA reflex to Microscopic and Culture ([facility] (except [place] and [place])  -      "Urine Microscopic  -     Urine Culture Aerobic Bacterial  -     ciprofloxacin (CIPRO) 250 MG tablet; Take 1 tablet (250 mg) by mouth 2 times daily    Patient is here for problem visit and not to get established    BMI:   Estimated body mass index is 28.27 kg/m  as calculated from the following:    Height as of this encounter: 1.778 m (5' 10\").    Weight as of this encounter: 89.4 kg (197 lb).   Weight management plan: Discussed healthy diet and exercise guidelines    Patient Instructions   Start Cipro 1 tablet twice daily for 5 days  Stay well hydrated  Can also drink cranberry juice  Follow up and make appointment to get established with PCP  Seek sooner medical attention if there is any worsening of symptoms or problems    The information in this document, created by the medical scribe for me, accurately reflects the services I personally performed and the decisions made by me. I have reviewed and approved this document for accuracy prior to leaving the patient care area.  July 19, 2019 2:45 PM    Gaby Duncan MD  [facility]        "
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
## 2019-07-19 NOTE — PATIENT INSTRUCTIONS
Start Cipro 1 tablet twice daily for 5 days  Stay well hydrated  Can also drink cranberry juice  Follow up and make appointment to get established with PCP  Seek sooner medical attention if there is any worsening of symptoms or problems

## 2019-07-21 LAB
BACTERIA SPEC CULT: NORMAL
SPECIMEN SOURCE: NORMAL

## 2019-07-21 NOTE — RESULT ENCOUNTER NOTE
Annabelle Saini,    This is to inform you regarding your test result.    Urine culture did not grow any significant organism.    Sincerely,      Dr.Nasima Dakota MD,FACP

## 2019-07-22 ENCOUNTER — TELEPHONE (OUTPATIENT)
Dept: PSYCHIATRY | Facility: CLINIC | Age: 29
End: 2019-07-22

## 2019-07-22 NOTE — TELEPHONE ENCOUNTER
Refill Request (venlafaxine (EFFEXOR-XR) 150 MG 24 hr capsule)     Jesús oRss MD  You 26 minutes ago (3:57 PM)      Ayaz Harris,         I would advise the patient to only take 1 capsule (150 mg) for 2-3 days, then return to his previous dose of 2 capsules (300 mg). If we discover that it has actually been longer than 2-3 days, then he should return to 150 mg for 1 week before increasing.     Best,     -Jesús Ross    Routing comment      Writer placed a call to patient at [phone] to relay the above information. Patient reports running out of the meds about 2 days ago. Meds lasted longer due to having extra from previous refill. Patient informed this writer PA was needed. Writer updated patient the PA was submitted on 7/18/19. Informed patient to start taking Effexor 150 mg for 2-3 days and then increase to 300 mg since they missed 2-3 days of medication. Patient agreed with the plan but does not have any medication at this time. Writer agreed to reach out to pharmacy to confirm if an emergency supply would be provided.     Placed a call to pharmacy at [phone] and confirmed with the pharmacist that emergency supply can be provided to the patient without any charge.    Patient notified to  the emergency supply and they agreed with the plan.

## 2019-07-22 NOTE — TELEPHONE ENCOUNTER
FW: Americo Refill urgent   Received: Today   Message Contents   Kimberly Nielsen, RN  Kimberly Nielsen, RN   Phone Number: [phone]          Previous Messages      ----- Message -----   From: Rossana Perales   Sent: 7/22/2019   1:36 PM   To: [facility]   Subject: Americo Refill urgent                               Hasn't had meds for two days, was informed she needs a prior auth     Caller:  self   Relationship to pt: self   Medication:   venlafaxine   How many days left of med do you have left (if >3 day supply, redirect to pharmacy): been without it for two days   Pharmacy and location: [facility]note 3]   Pending appt date:  8/9   Okay to leave detailed VM:  [phone]     Last seen: 3/18/19  RTC: 3 months   Cancel: 5/13/19  No-show: none   Next appt: 8/9/19    Incoming refill from patient via phone     Medication requested: venlafaxine (EFFEXOR-XR) 150 MG 24 hr capsule  Directions: Take 2 capsules by mouth daily  Qty: 60  Last refilled: 3/18/19      Writer placed a call to pharmacy at [phone] and spoke with a pharmacist who confirmed patient last refill the medication in April for a 30 day supply. Patient has 2 refills on file.     Placed a call to patient at [phone] to gather additional details. No answer at number provided. LVM, requesting a call back. Clinic number provided.

## 2019-07-23 NOTE — TELEPHONE ENCOUNTER
Central Prior Authorization Team   Phone: [phone]    PA Initiation    Medication: venlafaxine (EFFEXOR-XR) 150 MG 24 hr capsule  Insurance Company: [employer] - Phone [phone] Fax [phone]  Pharmacy Filling the Rx: [facility] [address] [place] [address] [address] AT [facility] [address]  Filling Pharmacy Phone: [phone]  Filling Pharmacy Fax:    Start Date: 7/23/2019

## 2019-07-23 NOTE — TELEPHONE ENCOUNTER
Prior Authorization Approval        Authorization Effective Date: 7/22/2019  Authorization Expiration Date: 7/22/2020  Medication: venlafaxine (EFFEXOR-XR) 150 MG 24 hr capsule - P/A APPROVED  Approved Dose/Quantity: 60  Reference #: [id number]   Insurance Company: [employer] - Phone [phone] Fax [phone]  Expected CoPay: $1.00  CoPay Card Available:      Foundation Assistance Needed:    Which Pharmacy is filling the prescription (Not needed for infusion/clinic administered): [facility] [address] [place] [address] E AT [facility] [address] [facility]  Pharmacy Notified: Yes  Patient Notified:

## 2019-07-24 ENCOUNTER — OFFICE VISIT (OUTPATIENT)
Dept: OTHER | Facility: OUTPATIENT CENTER | Age: 29
End: 2019-07-24
Payer: COMMERCIAL

## 2019-07-24 DIAGNOSIS — F64.0 GENDER DYSPHORIA IN ADOLESCENT AND ADULT: ICD-10-CM

## 2019-07-24 DIAGNOSIS — F43.10 POSTTRAUMATIC STRESS DISORDER: Primary | ICD-10-CM
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
## 2019-08-05 ENCOUNTER — OFFICE VISIT (OUTPATIENT)
Dept: OTHER | Facility: OUTPATIENT CENTER | Age: 29
End: 2019-08-05
Payer: COMMERCIAL

## 2019-08-05 DIAGNOSIS — F64.0 GENDER DYSPHORIA IN ADOLESCENT AND ADULT: Primary | ICD-10-CM

## 2019-08-05 DIAGNOSIS — F41.0 PANIC ATTACKS: ICD-10-CM

## 2019-08-05 DIAGNOSIS — F43.10 POSTTRAUMATIC STRESS DISORDER: ICD-10-CM

## 2019-08-05 DIAGNOSIS — F41.8 DEPRESSION WITH ANXIETY: ICD-10-CM

## 2019-08-05 RX ORDER — VENLAFAXINE HYDROCHLORIDE 150 MG/1
300 CAPSULE, EXTENDED RELEASE ORAL DAILY
Qty: 60 CAPSULE | Refills: 0 | Status: SHIPPED | OUTPATIENT
Start: 2019-08-05 | End: 2019-08-28

## 2019-08-05 RX ORDER — LAMOTRIGINE 200 MG/1
200 TABLET ORAL DAILY
Qty: 30 TABLET | Refills: 0 | Status: SHIPPED | OUTPATIENT
Start: 2019-08-05 | End: 2019-08-28
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
## 2019-08-05 NOTE — TELEPHONE ENCOUNTER
Medication requested:   lamoTRIgine (LAMICTAL) 200 MG tablet enlafaxine (EFFEXOR-XR) 150 MG 24 hr capsule  Last refilled:   7/3/19  7/22/19  Qty:   30  60      Last seen: 3/18/19  RTC: 3 months  Cancel: 1  No-show: 0  Next appt: 8/9/19    Refill decision:   Refill pended and routed to the provider for review/determination due to   Cancel x 1  Pt outside of RTC timeframe.

## 2019-08-08 ENCOUNTER — OFFICE VISIT (OUTPATIENT)
Dept: OTHER | Facility: OUTPATIENT CENTER | Age: 29
End: 2019-08-08
Payer: COMMERCIAL

## 2019-08-08 DIAGNOSIS — F64.0 GENDER DYSPHORIA IN ADOLESCENT AND ADULT: ICD-10-CM

## 2019-08-08 DIAGNOSIS — F43.10 POSTTRAUMATIC STRESS DISORDER: Primary | ICD-10-CM

## 2019-08-09 ENCOUNTER — TELEPHONE (OUTPATIENT)
Dept: PSYCHIATRY | Facility: CLINIC | Age: 29
End: 2019-08-09

## 2019-08-09 NOTE — TELEPHONE ENCOUNTER
Patient was a no-show for his scheduled appointment today. Called and left a voicemail asking him to reschedule.    Jesús Ross, PGY3
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
## 2019-08-28 ENCOUNTER — OFFICE VISIT (OUTPATIENT)
Dept: PSYCHIATRY | Facility: CLINIC | Age: 29
End: 2019-08-28
Attending: PSYCHIATRY & NEUROLOGY
Payer: COMMERCIAL

## 2019-08-28 VITALS
SYSTOLIC BLOOD PRESSURE: 104 MMHG | DIASTOLIC BLOOD PRESSURE: 72 MMHG | BODY MASS INDEX: 28.87 KG/M2 | WEIGHT: 201.2 LBS | HEART RATE: 82 BPM

## 2019-08-28 DIAGNOSIS — F31.81 BIPOLAR 2 DISORDER (H): Primary | ICD-10-CM

## 2019-08-28 DIAGNOSIS — F41.8 DEPRESSION WITH ANXIETY: ICD-10-CM

## 2019-08-28 DIAGNOSIS — F64.9 GENDER IDENTITY DISORDER: ICD-10-CM

## 2019-08-28 DIAGNOSIS — F41.0 PANIC ATTACKS: ICD-10-CM

## 2019-08-28 PROCEDURE — G0463 HOSPITAL OUTPT CLINIC VISIT: HCPCS | Mod: ZF

## 2019-08-28 RX ORDER — HYDROXYZINE HYDROCHLORIDE 25 MG/1
25 TABLET, FILM COATED ORAL EVERY 8 HOURS PRN
Qty: 30 TABLET | Refills: 0 | Status: SHIPPED | OUTPATIENT
Start: 2019-08-28 | End: 2020-01-15

## 2019-08-28 RX ORDER — VENLAFAXINE HYDROCHLORIDE 150 MG/1
300 CAPSULE, EXTENDED RELEASE ORAL DAILY
Qty: 60 CAPSULE | Refills: 2 | Status: SHIPPED | OUTPATIENT
Start: 2019-08-28 | End: 2019-10-11

## 2019-08-28 RX ORDER — LAMOTRIGINE 200 MG/1
200 TABLET ORAL DAILY
Qty: 30 TABLET | Refills: 2 | Status: SHIPPED | OUTPATIENT
Start: 2019-08-28 | End: 2019-10-11

## 2019-08-28 RX ORDER — GABAPENTIN 300 MG/1
900 CAPSULE ORAL DAILY
Qty: 90 CAPSULE | Refills: 2 | Status: SHIPPED | OUTPATIENT
Start: 2019-08-28 | End: 2019-08-30

## 2019-08-28 RX ORDER — GABAPENTIN 300 MG/1
300 CAPSULE ORAL DAILY PRN
Qty: 30 CAPSULE | Refills: 1 | Status: SHIPPED | OUTPATIENT
Start: 2019-08-28 | End: 2019-08-30

## 2019-08-28 ASSESSMENT — PAIN SCALES - GENERAL: PAINLEVEL: NO PAIN (0)

## 2019-08-28 ASSESSMENT — PATIENT HEALTH QUESTIONNAIRE - PHQ9: SUM OF ALL RESPONSES TO PHQ QUESTIONS 1-9: 8

## 2019-08-30 ENCOUNTER — TELEPHONE (OUTPATIENT)
Dept: PSYCHIATRY | Facility: CLINIC | Age: 29
End: 2019-08-30

## 2019-08-30 ENCOUNTER — OFFICE VISIT (OUTPATIENT)
Dept: FAMILY MEDICINE | Facility: CLINIC | Age: 29
End: 2019-08-30
Payer: COMMERCIAL

## 2019-08-30 VITALS
SYSTOLIC BLOOD PRESSURE: 106 MMHG | HEIGHT: 70 IN | HEART RATE: 70 BPM | OXYGEN SATURATION: 97 % | TEMPERATURE: 97.2 F | WEIGHT: 198 LBS | BODY MASS INDEX: 28.35 KG/M2 | DIASTOLIC BLOOD PRESSURE: 71 MMHG

## 2019-08-30 DIAGNOSIS — T88.7XXA MEDICATION SIDE EFFECTS: ICD-10-CM

## 2019-08-30 DIAGNOSIS — Z13.220 LIPID SCREENING: ICD-10-CM

## 2019-08-30 DIAGNOSIS — Z13.1 SCREENING FOR DIABETES MELLITUS: ICD-10-CM

## 2019-08-30 DIAGNOSIS — Z00.00 ROUTINE HISTORY AND PHYSICAL EXAMINATION OF ADULT: Primary | ICD-10-CM

## 2019-08-30 DIAGNOSIS — R53.83 FATIGUE, UNSPECIFIED TYPE: ICD-10-CM

## 2019-08-30 DIAGNOSIS — F41.8 DEPRESSION WITH ANXIETY: ICD-10-CM

## 2019-08-30 DIAGNOSIS — Z23 NEED FOR DIPHTHERIA-TETANUS-PERTUSSIS (TDAP) VACCINE: ICD-10-CM

## 2019-08-30 LAB
CHOLEST SERPL-MCNC: 184 MG/DL
HBA1C MFR BLD: 4.7 % (ref 0–5.6)
HDLC SERPL-MCNC: 40 MG/DL
LDLC SERPL CALC-MCNC: 119 MG/DL
NONHDLC SERPL-MCNC: 144 MG/DL
TRIGL SERPL-MCNC: 125 MG/DL
TSH SERPL DL<=0.005 MIU/L-ACNC: 2.32 MU/L (ref 0.4–4)

## 2019-08-30 PROCEDURE — 90715 TDAP VACCINE 7 YRS/> IM: CPT | Performed by: INTERNAL MEDICINE

## 2019-08-30 PROCEDURE — 93000 ELECTROCARDIOGRAM COMPLETE: CPT | Performed by: INTERNAL MEDICINE

## 2019-08-30 PROCEDURE — 99212 OFFICE O/P EST SF 10 MIN: CPT | Mod: 25 | Performed by: INTERNAL MEDICINE

## 2019-08-30 PROCEDURE — 80061 LIPID PANEL: CPT | Performed by: INTERNAL MEDICINE

## 2019-08-30 PROCEDURE — 83036 HEMOGLOBIN GLYCOSYLATED A1C: CPT | Performed by: INTERNAL MEDICINE

## 2019-08-30 PROCEDURE — 99395 PREV VISIT EST AGE 18-39: CPT | Mod: 25 | Performed by: INTERNAL MEDICINE

## 2019-08-30 PROCEDURE — 90471 IMMUNIZATION ADMIN: CPT | Performed by: INTERNAL MEDICINE

## 2019-08-30 PROCEDURE — 84443 ASSAY THYROID STIM HORMONE: CPT | Performed by: INTERNAL MEDICINE

## 2019-08-30 PROCEDURE — 36415 COLL VENOUS BLD VENIPUNCTURE: CPT | Performed by: INTERNAL MEDICINE

## 2019-08-30 RX ORDER — GABAPENTIN 300 MG/1
CAPSULE ORAL
Qty: 90 CAPSULE | Refills: 2 | Status: SHIPPED | OUTPATIENT
Start: 2019-08-30 | End: 2019-10-11

## 2019-08-30 ASSESSMENT — MIFFLIN-ST. JEOR: SCORE: 1713.37

## 2019-08-30 NOTE — TELEPHONE ENCOUNTER
"Ann Cuello, RN  Ann Cuello, RN   Phone Number: [phone]          Previous Messages      ----- Message -----   From: Austyn Reynoso   Sent: 8/30/2019   8:19 AM   To: [facility] Psychiat[facility]   Subject: Dr. Vandana Scott call                       Benjamin called from [facility] pertaining to patients Gabapentin. Benjamin is questioning the amount of the Gabapentin orders being \"up to 4 times daily for the 300 mg pill, although not more than 900 mg\".        Message routed to provider, as note is unsigned. Appears there are two separate prescriptions for gabapentin 300 mg.   "

## 2019-08-30 NOTE — TELEPHONE ENCOUNTER
Jesús Ross MD Labossiere, Laura, RN   Caller: Unspecified (Today,  8:29 AM)             Ayaz Juarez          Sorraffi for the confusion. I updated the dosing instructions at our last visit, and I am looking at my order now and I was not clear.           It should read Gabapentin 900 mg daily, can take an additional 300 mg BID prn for anxiety. I have entered a new order with this change.          I hope this clears things up.     -Jesús Carlson          Called [facility] pharmacy and confirmed that pharmacist received new Rx. He will discontinue previous prescriptions per order from provider.

## 2019-08-30 NOTE — PROGRESS NOTES
SUBJECTIVE:   CC: Parveen Kaplan is an 29 year old woman who presents for preventive health visit.     Healthy Habits:    Getting at least 3 servings of Calcium per day:  NO (1)    Bi-annual eye exam:  Yes    Dental care twice a year:  Yes    Sleep apnea or symptoms of sleep apnea:  Daytime drowsiness    Diet:  Regular (no restrictions)    Frequency of exercise:: 2 days/week.    Duration of exercise:  45-60 minutes    Taking medications regularly:  No    Barriers to taking medications:  None    Medication side effects:  None    PHQ-2 Total Score:    Additional concerns today:  No      Today's PHQ-2 Score:   PHQ-2 ( 1999 [employer]) 7/19/2019   Q1: Little interest or pleasure in doing things 0   Q2: Feeling down, depressed or hopeless 0   PHQ-2 Score 0   Some encounter information is confidential and restricted. Go to Review Flowsheets activity to see all data.       Abuse: Current or Past(Physical, Sexual or Emotional)- NO  Do you feel safe in your environment? YES    Social History     Tobacco Use     Smoking status: Never Smoker     Smokeless tobacco: Never Used   Substance Use Topics     Alcohol use: Not on file     Comment: SOCIAL     If you drink alcohol do you typically have >3 drinks per day or >7 drinks per week? No    Alcohol Use 8/30/2019   Prescreen: >3 drinks/day or >7 drinks/week? No       Reviewed orders with patient.  Reviewed health maintenance and updated orders accordingly - Yes  Lab work is in process  Labs reviewed in EPIC  BP Readings from Last 3 Encounters:   08/30/19 106/71   07/19/19 119/76   11/19/18 117/64    Wt Readings from Last 3 Encounters:   08/30/19 89.8 kg (198 lb)   07/19/19 89.4 kg (197 lb)   12/06/16 85.4 kg (188 lb 3.2 oz)                  Patient Active Problem List   Diagnosis     Migraine     Panic attacks     Depression with anxiety     History of psychiatric care     Past Surgical History:   Procedure Laterality Date     TRANSGENDER MASTECTOMY Bilateral 11/30/2016     "Procedure: TRANSGENDER MASTECTOMY;  Surgeon: Senait Gaines MD;  Location: [facility] OR       Social History     Tobacco Use     Smoking status: Never Smoker     Smokeless tobacco: Never Used   Substance Use Topics     Alcohol use: Not on file     Comment: SOCIAL     Family History   Problem Relation Age of Onset     Depression Mother      Depression Brother      Dialysis Other         MATERNAL UNCLE         Current Outpatient Medications   Medication Sig Dispense Refill     hydrOXYzine (ATARAX) 25 MG tablet Take 1 tablet (25 mg) by mouth every 8 hours as needed for anxiety 30 tablet 0     lamoTRIgine (LAMICTAL) 200 MG tablet Take 1 tablet (200 mg) by mouth daily 30 tablet 2     syringe/needle, disp, 25G X 1\" 1 ML MISC To use with weekly IM injection 10 each 3     testosterone cypionate (DEPO-TESTOSTERONE) 200 MG/ML injection Inject 0.35 mLs (70 mg) into the muscle once a week 2 mL 1     venlafaxine (EFFEXOR-XR) 150 MG 24 hr capsule Take 2 capsules (300 mg) by mouth daily 60 capsule 2     gabapentin (NEURONTIN) 300 MG capsule Take 3 capsules (900 mg) by mouth daily. May also take 1 capsule (300 mg) 2 times daily as needed for other (anxiety). 90 capsule 2     Allergies   Allergen Reactions     Imitrex [Sumatriptan] Shortness Of Breath     Recent Labs   Lab Test 02/02/16  1019 08/14/15  0759 07/03/14  0947 07/03/14  0909   LDL  --  109.0  --  95.0   HDL  --  43.0*  --  54.0   TRIG  --  59.0  --  70.0   ALT 27 19.0 31  --         Mammo discussed, not appropriate for or declined by this patient.    Pertinent mammograms are reviewed under the imaging tab.  History of abnormal Pap smear: NO - age 21-29 PAP every 3 years recommended     Reviewed and updated as needed this visit by clinical staff  Tobacco  Allergies  Meds  Surg Hx  Fam Hx  Soc Hx        Reviewed and updated as needed this visit by Provider  Tobacco  Surg Hx  Fam Hx  Soc Hx       Past Medical History:   Diagnosis Date     Acne       Past Surgical " "History:   Procedure Laterality Date     TRANSGENDER MASTECTOMY Bilateral 11/30/2016    Procedure: TRANSGENDER MASTECTOMY;  Surgeon: Senait Gaines MD;  Location: [facility] OR     OB History   No data available       Review of Systems  CONSTITUTIONAL: NEGATIVE for fever, chills, change in weight  INTEGUMENTARU/SKIN: NEGATIVE for worrisome rashes, moles or lesions  EYES: NEGATIVE for vision changes or irritation  EYES: POSITIVE for nearsightedness  ENT: NEGATIVE for ear, mouth and throat problems  RESP: NEGATIVE for significant cough or SOB  BREAST: NEGATIVE for masses, tenderness or discharge  BREAST: History of bilateral mastectomies  CV: NEGATIVE for chest pain, palpitations or peripheral edema  GI: NEGATIVE for nausea, abdominal pain, heartburn, or change in bowel habits  : NEGATIVE for unusual urinary or vaginal symptoms. Periods are regular.  MUSCULOSKELETAL: NEGATIVE for significant arthralgias or myalgia  NEURO: NEGATIVE for weakness, dizziness or paresthesias  ENDOCRINE: NEGATIVE for temperature intolerance, skin/hair changes  HEME/ALLERGY/IMMUNE: NEGATIVE for bleeding problems  PSYCHIATRIC: NEGATIVE for changes in mood or affect  PSYCHIATRIC: Posttraumatic stress disorder, HX anxiety, HX depression and fatigue, follows with psychiatry is maintained on Effexor  mg daily mood is stable.  No suicidal ideation     OBJECTIVE:   /71 (BP Location: Right arm, Patient Position: Sitting, Cuff Size: Adult Regular)   Pulse 70   Temp 97.2  F (36.2  C) (Oral)   Ht 1.778 m (5' 10\")   Wt 89.8 kg (198 lb)   SpO2 97%   Breastfeeding? No   BMI 28.41 kg/m    Physical Exam  GENERAL: healthy, alert and no distress  EYES: Eyes grossly normal to inspection, PERRL and conjunctivae and sclerae normal and wears corrective glasses  HENT: ear canals and TM's normal, nose and mouth without ulcers or lesions, positive hair on cheeks.  NECK: no adenopathy, no asymmetry, masses, or scars and thyroid normal to " palpation  RESP: lungs clear to auscultation - no rales, rhonchi or wheezes  BREAST: normal without masses, tenderness or nipple discharge, no palpable axillary masses or adenopathy and bilateral mastectomies scars bilateral; old  CV: regular rate and rhythm, normal S1 S2, no S3 or S4, no murmur, click or rub, no peripheral edema and peripheral pulses strong  ABDOMEN: soft, nontender, no hepatosplenomegaly, no masses and bowel sounds normal  MS: no gross musculoskeletal defects noted, no edema  SKIN: no suspicious lesions or rashes  NEURO: Normal strength and tone, mentation intact and speech normal  PSYCH: mentation appears normal, affect normal/bright  LYMPH: no cervical, supraclavicular, axillary, or inguinal adenopathy    Diagnostic Test Results:  Labs reviewed in Epic    ASSESSMENT/PLAN:   Parveen was seen today for physical.    Diagnoses and all orders for this visit:    Routine history and physical examination of adult    Need for diphtheria-tetanus-pertussis (Tdap) vaccine  -     TDAP, IM (10 - 64 YRS) - Adacel    Lipid screening  -     Lipid panel reflex to direct LDL Fasting    Screening for diabetes mellitus  -     Hemoglobin A1c    Fatigue, unspecified type  -     TSH    Medication side effects  -     EKG 12-lead complete w/read - Clinics      Patient declined HIV and hepatitis C testing.  He denies any history of abnormal Paps.  He had suicidal ideations 3 to 4 years ago, he was hospitalized and currently denies any suicidal ideation. Continue to follow with psychiatry for med management, on chronic SNRI.    COUNSELING:  Reviewed preventive health counseling, as reflected in patient instructions       Regular exercise       Healthy diet/nutrition       Vision screening       Hearing screening       Immunizations    Vaccinated for: TDAP             Family planning       Safe sex practices/STD prevention       HIV screeninx in teen years, 1x in adult years, and at intervals if high risk    Estimated  "body mass index is 28.41 kg/m  as calculated from the following:    Height as of this encounter: 1.778 m (5' 10\").    Weight as of this encounter: 89.8 kg (198 lb).    Weight management plan: Discussed healthy diet and exercise guidelines     reports that she has never smoked. She has never used smokeless tobacco.    Time spent face-to-face was 10 minutes with more than 50% counseling, review of records and addressing multiple health problems as listed in Assessment Plan in addition to physical.  Counseling Resources:  ATP IV Guidelines  Pooled Cohorts Equation Calculator  Breast Cancer Risk Calculator  FRAX Risk Assessment  ICSI Preventive Guidelines  Dietary Guidelines for Americans, 2010  USDA's MyPlate  ASA Prophylaxis  Lung CA Screening    Cris Blanc MD  [facility]  "

## 2019-08-30 NOTE — NURSING NOTE
Prior to immunization administration, verified patients identity using patient s name and date of birth. Please see Immunization Activity for additional information.     Screening Questionnaire for Adult Immunization    Are you sick today?   No   Do you have allergies to medications, food, a vaccine component or latex?   No   Have you ever had a serious reaction after receiving a vaccination?   No   Do you have a long-term health problem with heart disease, lung disease, asthma, kidney disease, metabolic disease (e.g. diabetes), anemia, or other blood disorder?   No   Do you have cancer, leukemia, HIV/AIDS, or any other immune system problem?   No   In the past 3 months, have you taken medications that affect  your immune system, such as prednisone, other steroids, or anticancer drugs; drugs for the treatment of rheumatoid arthritis, Crohn s disease, or psoriasis; or have you had radiation treatments?   No   Have you had a seizure, or a brain or other nervous system problem?   No   During the past year, have you received a transfusion of blood or blood     products, or been given immune (gamma) globulin or antiviral drug?   No   For women: Are you pregnant or is there a chance you could become        pregnant during the next month?   No   Have you received any vaccinations in the past 4 weeks?   No     Immunization questionnaire answers were all negative.        Per orders of Dr. Blanc, injection of Tdap given by Libra West CMA. Patient instructed to remain in clinic for 15 minutes afterwards, and to report any adverse reaction to me immediately.  Libra West CMA on 8/30/2019 at 10:13 AM       Screening performed by Libra West CMA on 8/30/2019 at 10:13 AM.

## 2019-09-04 ENCOUNTER — TELEPHONE (OUTPATIENT)
Dept: OTHER | Facility: OUTPATIENT CENTER | Age: 29
End: 2019-09-04

## 2019-09-09 ENCOUNTER — TELEPHONE (OUTPATIENT)
Dept: OTHER | Facility: OUTPATIENT CENTER | Age: 29
End: 2019-09-09

## 2019-09-09 NOTE — TELEPHONE ENCOUNTER
Patient stated mother forgot about the appointment, therefore will be unable to attend. Transferred to provider .

## 2019-09-16 ENCOUNTER — OFFICE VISIT (OUTPATIENT)
Dept: OTHER | Facility: OUTPATIENT CENTER | Age: 29
End: 2019-09-16
Payer: COMMERCIAL

## 2019-09-16 DIAGNOSIS — F64.0 GENDER DYSPHORIA IN ADOLESCENT AND ADULT: ICD-10-CM

## 2019-09-16 DIAGNOSIS — F43.10 POSTTRAUMATIC STRESS DISORDER: Primary | ICD-10-CM
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
## 2019-09-30 ENCOUNTER — OFFICE VISIT (OUTPATIENT)
Dept: OTHER | Facility: OUTPATIENT CENTER | Age: 29
End: 2019-09-30
Payer: COMMERCIAL

## 2019-09-30 DIAGNOSIS — F43.10 POSTTRAUMATIC STRESS DISORDER: ICD-10-CM

## 2019-09-30 DIAGNOSIS — F64.0 GENDER DYSPHORIA IN ADOLESCENT AND ADULT: Primary | ICD-10-CM

## 2019-10-07 ASSESSMENT — PATIENT HEALTH QUESTIONNAIRE - PHQ9: SUM OF ALL RESPONSES TO PHQ QUESTIONS 1-9: 3

## 2019-10-11 ENCOUNTER — OFFICE VISIT (OUTPATIENT)
Dept: OTHER | Facility: OUTPATIENT CENTER | Age: 29
End: 2019-10-11
Payer: COMMERCIAL

## 2019-10-11 VITALS
SYSTOLIC BLOOD PRESSURE: 108 MMHG | DIASTOLIC BLOOD PRESSURE: 73 MMHG | HEIGHT: 70 IN | WEIGHT: 206 LBS | BODY MASS INDEX: 29.49 KG/M2 | HEART RATE: 84 BPM

## 2019-10-11 DIAGNOSIS — F41.0 PANIC ATTACKS: ICD-10-CM

## 2019-10-11 DIAGNOSIS — F43.10 PTSD (POST-TRAUMATIC STRESS DISORDER): Primary | ICD-10-CM

## 2019-10-11 DIAGNOSIS — Z63.79: ICD-10-CM

## 2019-10-11 DIAGNOSIS — F31.70 BIPOLAR AFFECTIVE DISORDER IN REMISSION (H): ICD-10-CM

## 2019-10-11 RX ORDER — GABAPENTIN 300 MG/1
CAPSULE ORAL
Qty: 90 CAPSULE | Refills: 2 | Status: SHIPPED | OUTPATIENT
Start: 2019-10-11 | End: 2020-01-15

## 2019-10-11 RX ORDER — CLONIDINE HYDROCHLORIDE 0.1 MG/1
TABLET ORAL
Qty: 30 TABLET | Refills: 1 | Status: SHIPPED | OUTPATIENT
Start: 2019-10-11 | End: 2020-01-15

## 2019-10-11 RX ORDER — VENLAFAXINE HYDROCHLORIDE 150 MG/1
300 CAPSULE, EXTENDED RELEASE ORAL DAILY
Qty: 60 CAPSULE | Refills: 2 | Status: SHIPPED | OUTPATIENT
Start: 2019-10-11 | End: 2020-01-15

## 2019-10-11 RX ORDER — LAMOTRIGINE 200 MG/1
200 TABLET ORAL DAILY
Qty: 30 TABLET | Refills: 2 | Status: SHIPPED | OUTPATIENT
Start: 2019-10-11 | End: 2020-01-15

## 2019-10-11 ASSESSMENT — MIFFLIN-ST. JEOR: SCORE: 1749.66
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
## 2019-10-11 NOTE — PATIENT INSTRUCTIONS
-May start Clonidine 0.1 mg at bedtime for nightmares and anxiety.  Monitor your pulse before taking the medication, if it's less than 60 per minutes, hold the medication.    -Continue all other medications

## 2019-10-11 NOTE — NURSING NOTE
"Chief Complaint   Patient presents with     Consult     Medication Management       Vitals:    10/11/19 1012   BP: 108/73   Pulse: 84   Weight: 93.4 kg (206 lb)   Height: 1.778 m (5' 10\")       Body mass index is 29.56 kg/m .      Rossana Beasley CMA    "

## 2019-10-16 NOTE — TELEPHONE ENCOUNTER
Prior Authorization Approval    Authorization Effective Date: 6/12/2018  Authorization Expiration Date: 6/13/2019  Medication: venlafaxine  mg capsule - approved  Approved Dose/Quantity:   Reference #: [id number]   Insurance Company: [employer] - Phone [phone] Fax [phone]  Expected CoPay: n/a     CoPay Card Available:      Foundation Assistance Needed:    Which Pharmacy is filling the prescription (Not needed for infusion/clinic administered[facility] [address] [place] [address] [address] [facility] [address]  Pharmacy Notified: Yes  Patient Notified: Yes                           Tolerated exercise well, no complaints voiced.

## 2019-10-17 ASSESSMENT — ANXIETY QUESTIONNAIRES
2. NOT BEING ABLE TO STOP OR CONTROL WORRYING: MORE THAN HALF THE DAYS
7. FEELING AFRAID AS IF SOMETHING AWFUL MIGHT HAPPEN: NOT AT ALL
GAD7 TOTAL SCORE: 9
1. FEELING NERVOUS, ANXIOUS, OR ON EDGE: NEARLY EVERY DAY
3. WORRYING TOO MUCH ABOUT DIFFERENT THINGS: SEVERAL DAYS
5. BEING SO RESTLESS THAT IT IS HARD TO SIT STILL: SEVERAL DAYS
6. BECOMING EASILY ANNOYED OR IRRITABLE: SEVERAL DAYS

## 2019-10-17 ASSESSMENT — PATIENT HEALTH QUESTIONNAIRE - PHQ9
SUM OF ALL RESPONSES TO PHQ QUESTIONS 1-9: 7
5. POOR APPETITE OR OVEREATING: SEVERAL DAYS

## 2019-10-18 ASSESSMENT — ANXIETY QUESTIONNAIRES: GAD7 TOTAL SCORE: 9
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
## 2019-10-23 ENCOUNTER — OFFICE VISIT (OUTPATIENT)
Dept: URGENT CARE | Facility: URGENT CARE | Age: 29
End: 2019-10-23
Payer: COMMERCIAL

## 2019-10-23 VITALS
HEIGHT: 70 IN | SYSTOLIC BLOOD PRESSURE: 124 MMHG | OXYGEN SATURATION: 95 % | BODY MASS INDEX: 29.56 KG/M2 | HEART RATE: 97 BPM | DIASTOLIC BLOOD PRESSURE: 82 MMHG | TEMPERATURE: 97.7 F

## 2019-10-23 DIAGNOSIS — R07.0 THROAT PAIN: ICD-10-CM

## 2019-10-23 DIAGNOSIS — J02.9 SUPPURATIVE PHARYNGITIS: Primary | ICD-10-CM

## 2019-10-23 LAB
DEPRECATED S PYO AG THROAT QL EIA: NORMAL
SPECIMEN SOURCE: NORMAL

## 2019-10-23 PROCEDURE — 99203 OFFICE O/P NEW LOW 30 MIN: CPT | Performed by: PHYSICIAN ASSISTANT

## 2019-10-23 PROCEDURE — 87880 STREP A ASSAY W/OPTIC: CPT | Performed by: PHYSICIAN ASSISTANT

## 2019-10-23 PROCEDURE — 87081 CULTURE SCREEN ONLY: CPT | Performed by: PHYSICIAN ASSISTANT

## 2019-10-23 RX ORDER — AMOXICILLIN 875 MG
875 TABLET ORAL 2 TIMES DAILY
Qty: 20 TABLET | Refills: 0 | Status: SHIPPED | OUTPATIENT
Start: 2019-10-23 | End: 2019-11-02

## 2019-10-23 NOTE — PROGRESS NOTES
"Patient presents with:  Urgent Care:  pt states sore throat sxs 4x days   Ear Problem: Bilateral ear pain sxs 4x days   Headache: frontal headaches 1x days       SUBJECTIVE:   Parveen Kaplan is a 29 year old adult presenting with a chief complaint of:  1) sore throat for the past 4 days  2) bilateral ear pain  3) headache  Denies any sinus congestion  Denies any known fevers.    Slight cough.     Onset of symptoms was as above.  Course of illness is worsening.    Severity moderate  Current and Associated symptoms: as above  Treatment measures tried include otc cold medications.    Predisposing factors include none.    Past Medical History:   Diagnosis Date     Acne      Patient Active Problem List   Diagnosis     Migraine     Panic attacks     Depression with anxiety     History of psychiatric care     Social History     Tobacco Use     Smoking status: Never Smoker     Smokeless tobacco: Never Used   Substance Use Topics     Alcohol use: Not on file     Comment: SOCIAL       ROS:  CONSTITUTIONAL:NEGATIVE for fever, chills, change in weight  INTEGUMENTARY/SKIN: NEGATIVE for worrisome rashes, moles or lesions  EYES: NEGATIVE for vision changes or irritation  ENT/MOUTH: as per HPI  RESP:as per HPI  CV: NEGATIVE for chest pain, palpitations or peripheral edema  GI: NEGATIVE for nausea, abdominal pain, heartburn, or change in bowel habits  MUSCULOSKELETAL: NEGATIVE for significant arthralgias or myalgia  NEURO: NEGATIVE for weakness, dizziness or paresthesias  Review of systems negative except as stated above.    OBJECTIVE  :/82   Pulse 97   Temp 97.7  F (36.5  C) (Tympanic)   Ht 1.778 m (5' 10\")   SpO2 95%   BMI 29.56 kg/m    GENERAL APPEARANCE: healthy, alert and no distress  EYES: EOMI,  PERRL, conjunctiva clear  HENT: ear canals and TM's normal.  Nose and mouth without ulcers, erythema or lesions  OP is erythematous  NECK: supple, with tender lymphadenopathy  RESP: lungs clear to auscultation - no " rales, rhonchi or wheezes  CV: regular rates and rhythm, normal S1 S2, no murmur noted  ABDOMEN:  soft, nontender, no HSM or masses and bowel sounds normal  NEURO: Normal strength and tone, sensory exam grossly normal,  normal speech and mentation  SKIN: no suspicious lesions or rashes    (J02.9) Suppurative pharyngitis  (primary encounter diagnosis)  Comment:   Plan: amoxicillin (AMOXIL) 875 MG tablet            (R07.0) Throat pain  Comment:   Plan: Strep, Rapid Screen, Beta strep group A culture          Salt water gargles.     Ibuprofen as needed.      Follow up with primary clinic should symptoms persist or worsen.

## 2019-10-23 NOTE — LETTER
[facility]  [address] [address]  [phone]      October 23, 2019    RE:  Parveen Kaplan                                                                                                                                                       [address] [facility] [address]            To whom it may concern:    Parveen Kaplan was seen in clinic today for illness and may return to work/school on Friday.          Sincerely,        Silvia Sher PA-C    [place] [facility]

## 2019-10-23 NOTE — PATIENT INSTRUCTIONS
(J02.9) Suppurative pharyngitis  (primary encounter diagnosis)  Comment:   Plan: amoxicillin (AMOXIL) 875 MG tablet            (R07.0) Throat pain  Comment:   Plan: Strep, Rapid Screen, Beta strep group A culture          Salt water gargles.     Ibuprofen as needed.      Follow up with primary clinic should symptoms persist or worsen.

## 2019-10-24 LAB
BACTERIA SPEC CULT: NORMAL
SPECIMEN SOURCE: NORMAL

## 2019-11-25 ENCOUNTER — OFFICE VISIT (OUTPATIENT)
Dept: OTHER | Facility: OUTPATIENT CENTER | Age: 29
End: 2019-11-25
Payer: COMMERCIAL

## 2019-11-25 DIAGNOSIS — F43.10 PTSD (POST-TRAUMATIC STRESS DISORDER): ICD-10-CM

## 2019-11-25 DIAGNOSIS — F64.0 GENDER DYSPHORIA IN ADOLESCENT AND ADULT: Primary | ICD-10-CM
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
## 2020-01-08 ENCOUNTER — OFFICE VISIT (OUTPATIENT)
Dept: OTHER | Facility: OUTPATIENT CENTER | Age: 30
End: 2020-01-08
Payer: COMMERCIAL

## 2020-01-08 DIAGNOSIS — F64.0 GENDER DYSPHORIA IN ADOLESCENT AND ADULT: ICD-10-CM

## 2020-01-08 DIAGNOSIS — F43.10 PTSD (POST-TRAUMATIC STRESS DISORDER): Primary | ICD-10-CM
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
## 2020-01-15 ENCOUNTER — OFFICE VISIT (OUTPATIENT)
Dept: OTHER | Facility: OUTPATIENT CENTER | Age: 30
End: 2020-01-15
Payer: COMMERCIAL

## 2020-01-15 VITALS — SYSTOLIC BLOOD PRESSURE: 121 MMHG | HEART RATE: 100 BPM | DIASTOLIC BLOOD PRESSURE: 78 MMHG

## 2020-01-15 DIAGNOSIS — F31.70 BIPOLAR AFFECTIVE DISORDER IN REMISSION (H): ICD-10-CM

## 2020-01-15 DIAGNOSIS — F43.10 PTSD (POST-TRAUMATIC STRESS DISORDER): Primary | ICD-10-CM

## 2020-01-15 DIAGNOSIS — F64.9 GENDER IDENTITY DISORDER: ICD-10-CM

## 2020-01-15 DIAGNOSIS — F41.0 PANIC ATTACKS: ICD-10-CM

## 2020-01-15 DIAGNOSIS — Z79.899 MEDICATION MANAGEMENT: ICD-10-CM

## 2020-01-15 RX ORDER — HYDROXYZINE HYDROCHLORIDE 25 MG/1
25 TABLET, FILM COATED ORAL EVERY 8 HOURS PRN
Qty: 30 TABLET | Refills: 1 | Status: SHIPPED | OUTPATIENT
Start: 2020-01-15 | End: 2021-02-17

## 2020-01-15 RX ORDER — VENLAFAXINE HYDROCHLORIDE 150 MG/1
300 CAPSULE, EXTENDED RELEASE ORAL DAILY
Qty: 180 CAPSULE | Refills: 0 | Status: SHIPPED | OUTPATIENT
Start: 2020-01-15 | End: 2020-03-20

## 2020-01-15 RX ORDER — GABAPENTIN 300 MG/1
CAPSULE ORAL
Qty: 90 CAPSULE | Refills: 2 | Status: SHIPPED | OUTPATIENT
Start: 2020-01-15 | End: 2020-03-20

## 2020-01-15 RX ORDER — LAMOTRIGINE 200 MG/1
200 TABLET ORAL DAILY
Qty: 90 TABLET | Refills: 0 | Status: SHIPPED | OUTPATIENT
Start: 2020-01-15 | End: 2020-03-20

## 2020-01-15 RX ORDER — GABAPENTIN 300 MG/1
CAPSULE ORAL
Qty: 360 CAPSULE | Refills: 0 | Status: SHIPPED | OUTPATIENT
Start: 2020-01-15 | End: 2020-03-20

## 2020-01-15 ASSESSMENT — ANXIETY QUESTIONNAIRES
2. NOT BEING ABLE TO STOP OR CONTROL WORRYING: SEVERAL DAYS
6. BECOMING EASILY ANNOYED OR IRRITABLE: SEVERAL DAYS
GAD7 TOTAL SCORE: 9
7. FEELING AFRAID AS IF SOMETHING AWFUL MIGHT HAPPEN: NOT AT ALL
1. FEELING NERVOUS, ANXIOUS, OR ON EDGE: MORE THAN HALF THE DAYS
3. WORRYING TOO MUCH ABOUT DIFFERENT THINGS: MORE THAN HALF THE DAYS
5. BEING SO RESTLESS THAT IT IS HARD TO SIT STILL: MORE THAN HALF THE DAYS

## 2020-01-15 ASSESSMENT — PATIENT HEALTH QUESTIONNAIRE - PHQ9: 5. POOR APPETITE OR OVEREATING: SEVERAL DAYS

## 2020-01-15 NOTE — PATIENT INSTRUCTIONS
-Continue on current medication regimen, may consider Ceprohepatidine for nightmares in the future?    -Complete labs with other labs
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
## 2020-01-21 ASSESSMENT — PATIENT HEALTH QUESTIONNAIRE - PHQ9: SUM OF ALL RESPONSES TO PHQ QUESTIONS 1-9: 5

## 2020-01-22 ASSESSMENT — ANXIETY QUESTIONNAIRES: GAD7 TOTAL SCORE: 9

## 2020-02-13 ENCOUNTER — TELEPHONE (OUTPATIENT)
Dept: FAMILY MEDICINE | Facility: CLINIC | Age: 30
End: 2020-02-13

## 2020-02-13 NOTE — TELEPHONE ENCOUNTER
"  Panel Management Review      Patient has the following on his problem list: None      Composite cancer screening  Chart review shows that this patient is due/due soon for the following Pap Smear  Summary:    Patient is due/failing the following:   PAP    Action needed:   Patient needs office visit for Pap.    Type of outreach:    none    Questions for provider review:    UNSURE WHAT TO DO WITH THIS. PATIENT IS BORN FEMALE BUT IDENTIFIES 'MALE\". PATIENT HASNT HAD ANY PAPS DONE AND THERE IS NO PAP IN UNDER CARE EVERYWHERE                                                                                                                                    Godfrey Vera CMA on 2/13/2020 at 3:23 PM       Chart routed to Provider .          "

## 2020-02-17 ENCOUNTER — OFFICE VISIT (OUTPATIENT)
Dept: OTHER | Facility: OUTPATIENT CENTER | Age: 30
End: 2020-02-17
Payer: COMMERCIAL

## 2020-02-17 DIAGNOSIS — F64.0 GENDER DYSPHORIA IN ADOLESCENT AND ADULT: ICD-10-CM

## 2020-02-17 DIAGNOSIS — F43.10 PTSD (POST-TRAUMATIC STRESS DISORDER): Primary | ICD-10-CM
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
## 2020-02-28 ENCOUNTER — OFFICE VISIT (OUTPATIENT)
Dept: URGENT CARE | Facility: URGENT CARE | Age: 30
End: 2020-02-28
Payer: COMMERCIAL

## 2020-02-28 VITALS
HEART RATE: 91 BPM | DIASTOLIC BLOOD PRESSURE: 80 MMHG | WEIGHT: 198 LBS | TEMPERATURE: 98.2 F | SYSTOLIC BLOOD PRESSURE: 122 MMHG | OXYGEN SATURATION: 97 % | BODY MASS INDEX: 28.41 KG/M2 | RESPIRATION RATE: 16 BRPM

## 2020-02-28 DIAGNOSIS — R11.2 INTRACTABLE VOMITING WITH NAUSEA, UNSPECIFIED VOMITING TYPE: ICD-10-CM

## 2020-02-28 DIAGNOSIS — H60.391 OTHER INFECTIVE ACUTE OTITIS EXTERNA OF RIGHT EAR: Primary | ICD-10-CM

## 2020-02-28 DIAGNOSIS — J01.00 ACUTE NON-RECURRENT MAXILLARY SINUSITIS: ICD-10-CM

## 2020-02-28 DIAGNOSIS — H65.06 RECURRENT ACUTE SEROUS OTITIS MEDIA OF BOTH EARS: ICD-10-CM

## 2020-02-28 PROCEDURE — 99214 OFFICE O/P EST MOD 30 MIN: CPT | Performed by: FAMILY MEDICINE

## 2020-02-28 RX ORDER — NEOMYCIN SULFATE, POLYMYXIN B SULFATE AND HYDROCORTISONE 10; 3.5; 1 MG/ML; MG/ML; [USP'U]/ML
3 SUSPENSION/ DROPS AURICULAR (OTIC) 3 TIMES DAILY
Qty: 10 ML | Refills: 0 | Status: SHIPPED | OUTPATIENT
Start: 2020-02-28 | End: 2020-05-13

## 2020-02-28 RX ORDER — DOXYCYCLINE HYCLATE 100 MG
100 TABLET ORAL 2 TIMES DAILY
Qty: 20 TABLET | Refills: 0 | Status: SHIPPED | OUTPATIENT
Start: 2020-02-28 | End: 2020-08-11

## 2020-02-28 RX ORDER — FLUTICASONE PROPIONATE 50 MCG
1 SPRAY, SUSPENSION (ML) NASAL 2 TIMES DAILY
Qty: 16 G | Refills: 0 | Status: SHIPPED | OUTPATIENT
Start: 2020-02-28 | End: 2020-05-13

## 2020-02-28 RX ORDER — ONDANSETRON 4 MG/1
4 TABLET, FILM COATED ORAL EVERY 8 HOURS PRN
Qty: 12 TABLET | Refills: 0 | Status: SHIPPED | OUTPATIENT
Start: 2020-02-28

## 2020-02-28 NOTE — PATIENT INSTRUCTIONS
"Return if fever, no better  Call your primary provider and request ENT referral due to frequent sinus infections and ear infections    Vomiting (Adult)  Vomiting is a common symptom that may be due to different causes. These include gastroenteritis (\"stomach flu\"), food poisoning and gastritis. There are other more serious causes of vomiting which may be hard to diagnose early in the illness. Therefore, it is important to watch for the warning signs listed below.  The main danger from repeated vomiting is dehydration. This is due to excess loss of water and minerals from the body. When this occurs, your body fluids must be replaced.  Home care    If symptoms are severe, rest at home for the next 24 hours.    Because your symptoms may be from an infection, wash your hands often and well. If soap and water are not available, use alcohol-based  to keep from spreading the infection to others.    Wash your hands for at least 20 seconds. Humming the happy birthday song twice while you wash is an easy way to make sure you've washed for 20 seconds.    Wash your hands after using the toilet, before and after preparing food, before eating food, after changing a diaper, cleaning a wound, caring for a sick person, and blowing your nose, coughing, or sneezing. You should also wash your hands after caring for someone who is sick, touching pet food, or treats, and touching an animal, or animal waste.    You may use acetaminophen or NSAID medicines like ibuprofen or naproxen to control fever, unless another medicine was prescribed. If you have chronic liver or kidney disease or ever had a stomach ulcer or gastrointestinal bleeding, talk with your doctor before using these medicines. Aspirin should never be used in anyone under 18 years of age who is ill with a fever. It may cause severe liver damage. Don't use NSAID medicines if you are already taking one for another condition (like arthritis) or are on aspirin (such as " for heart disease, or after a stroke)    Don't use tobacco and or drink alcohol, which may worsen your symptoms.    If medicines for vomiting were prescribed, take as directed.    Once vomiting stops, then follow these guidelines:  During the first 12 to 24 hours follow the diet below:    Fruit juices. Apple, grape juice, clear fruit drinks, and electrolyte replacement drinks.    Beverages. Soft drinks without caffeine; mineral water (plain or flavored), decaffeinated tea and coffee.    Soups. Clear broth and bouillon    Desserts. Plain gelatin, ice pops, and fruit juice bars. As you feel better, you may add 6 to 8 ounces of yogurt per day.  During the next 24 hours you may add the following to the above:    Hot cereal, plain toast, bread, rolls, crackers    Plain noodles, rice, mashed potatoes, chicken noodle or rice soup    Unsweetened canned fruit such as applesauce, bananas (avoid pineapple and citrus)    Limit caffeine and chocolate. No spices or seasonings except salt.  During the next 24 hours:  Gradually resume a normal diet, as you feel better and your symptoms lessen.  Follow-up care  Follow up with your healthcare provider, or as advised.  When to seek medical advice  Call your healthcare provider right away if any of these occur:    Constant right-sided lower belly pain or increasing general belly pain    Continued vomiting (unable to keep liquids down) for 24 hours    Vomiting blood or coffee grounds    Swollen belly    Frequent diarrhea (more than 5 times a day); blood (red or black color) or mucus in diarrhea    Reduced urine output or extreme thirst    Weakness, dizziness or fainting    Unusually drowsy or confused    Fever of 100.4 F (38 C) oral or higher, or as directed    Yellow color of the eyes or skin  Date Last Reviewed: 3/1/2018    8995-9528 [employer]. [address] [place] [address], [place] [address]. All rights reserved. This information is not intended as a substitute for  professional medical care. Always follow your healthcare professional's instructions.

## 2020-02-28 NOTE — PROGRESS NOTES
Patient presents with:  URI: congestion, sore throat, nausea, cough, chest congestion, mucus X 2 weeks        Subjective     Parveen Kaplan is a 29 year old adult who presents to clinic today for the following health issues:    HPI   RESPIRATORY SYMPTOMS      Duration: 2 weeks    Description  nasal congestion, rhinorrhea, facial pain/pressure, dental pain  Cough has resolved, fever, chills at onset 2 weeks ago , ear fullness , fatigue/malaise, nausea/emesis times 4, keeping fluids down, not solid, no diarrhea    Severity: moderate    Accompanying signs and symptoms: None    History (predisposing factors):  No known exposure, classmate with emesis last week     Precipitating or alleviating factors: None    Therapies tried and outcome:  rest and fluids muscinex, dayquil, theraflu      Patient Active Problem List   Diagnosis     Migraine     Panic attacks     Depression with anxiety     History of psychiatric care     Past Surgical History:   Procedure Laterality Date     TRANSGENDER MASTECTOMY Bilateral 11/30/2016    Procedure: TRANSGENDER MASTECTOMY;  Surgeon: Senait Gaines MD;  Location:  OR       Social History     Tobacco Use     Smoking status: Never Smoker     Smokeless tobacco: Never Used   Substance Use Topics     Alcohol use: Not on file     Comment: SOCIAL     Family History   Problem Relation Age of Onset     Depression Mother      Depression Brother      Dialysis Other         MATERNAL UNCLE         Current Outpatient Medications   Medication Sig Dispense Refill     gabapentin (NEURONTIN) 300 MG capsule Take 3 capsules (900 mg) by mouth daily. May also take 1 capsule (300 mg) 2 times daily as needed for other (anxiety). 90 capsule 2     gabapentin (NEURONTIN) 300 MG capsule Take 3 capsules daily, may take 1 capsule up to 2 times a day as needed additionally for anxiety 360 capsule 0     hydrOXYzine (ATARAX) 25 MG tablet Take 1 tablet (25 mg) by mouth every 8 hours as needed for anxiety 30  "tablet 1     lamoTRIgine (LAMICTAL) 200 MG tablet Take 1 tablet (200 mg) by mouth daily 90 tablet 0     naloxone (NARCAN) 4 MG/0.1ML nasal spray Spray 1 spray (4 mg) into one nostril alternating nostrils as needed for opioid reversal every 2-3 minutes until assistance arrives 0.2 mL 11     syringe/needle, disp, 25G X 1\" 1 ML MISC To use with weekly IM injection 10 each 3     testosterone (TESTOPEL) 75 MG subcutaneous implant pellet 825 mg by Subdermal route every 30 days       venlafaxine (EFFEXOR-XR) 150 MG 24 hr capsule Take 2 capsules (300 mg) by mouth daily 180 capsule 0     Allergies   Allergen Reactions     Imitrex [Sumatriptan] Shortness Of Breath     Recent Labs   Lab Test 08/30/19  0946 02/02/16  1019 08/14/15  0759 07/03/14  0947 07/03/14  0909   A1C 4.7  --   --   --   --    *  --  109.0  --  95.0   HDL 40*  --  43.0*  --  54.0   TRIG 125  --  59.0  --  70.0   ALT  --  27 19.0 31  --    TSH 2.32  --   --   --   --       BP Readings from Last 3 Encounters:   02/28/20 122/80   10/23/19 124/82   08/30/19 106/71    Wt Readings from Last 3 Encounters:   02/28/20 89.8 kg (198 lb)   08/30/19 89.8 kg (198 lb)   07/19/19 89.4 kg (197 lb)                      Reviewed and updated as needed this visit by Provider         Review of Systems   ROS COMP: Constitutional, HEENT, cardiovascular, pulmonary, gi and gu systems are negative, except as otherwise noted.      Objective    /80   Pulse 91   Temp 98.2  F (36.8  C) (Oral)   Resp 16   Wt 89.8 kg (198 lb)   SpO2 97%   BMI 28.41 kg/m    Body mass index is 28.41 kg/m .      GENERAL: Pleasant and interactive.  Alert and oriented x 3.  No acute distress.   HEENT: Eyes clear.  Nose with mild clear/white mucous. Oropharynx clear.  Effected ear(s) show(s) opacity and erythema of the TM.  NECK: supple and free of adenopathy or masses, the thyroid is normal without enlargement or nodules.   CHEST:  clear, no wheezing or rales. Normal symmetric air entry " throughout both lung fields.  CV RRR w/o M/G/R    SKIN:  Only benign skin findings. No unusual rashes or suspicious skin lesions noted.       Diagnostic Test Results:  Labs reviewed in Epic  No results found for any visits on 02/28/20.        Assessment & Plan       ICD-10-CM    1. Other infective acute otitis externa of right ear H60.391 neomycin-polymyxin-hydrocortisone (CORTISPORIN) 3.5-58275-5 otic suspension   2. Acute non-recurrent maxillary sinusitis J01.00 doxycycline hyclate (VIBRA-TABS) 100 MG tablet     fluticasone (FLONASE) 50 MCG/ACT nasal spray   3. Recurrent acute serous otitis media of both ears H65.06 fluticasone (FLONASE) 50 MCG/ACT nasal spray   4. Intractable vomiting with nausea, unspecified vomiting type R11.2 ondansetron (ZOFRAN) 4 MG tablet      Return if fever, no better  Call your primary provider and request ENT referral due to frequent sinus infections and ear infections        Carrie Holland MD  [facility]    Reviewed medication instructions and side effects. Follow up if experiences side effects.     I reviewed supportive care, otc meds to use if needed, expected course, and signs of concern.  Follow up as needed or if he does not improve within 1 -2  days  or if worsens in any way.  Reviewed red flag symptoms and is to go to the ER if experiences any of these.

## 2020-03-02 ENCOUNTER — OFFICE VISIT (OUTPATIENT)
Dept: OTHER | Facility: OUTPATIENT CENTER | Age: 30
End: 2020-03-02
Payer: COMMERCIAL

## 2020-03-02 DIAGNOSIS — F43.10 PTSD (POST-TRAUMATIC STRESS DISORDER): Primary | ICD-10-CM

## 2020-03-02 DIAGNOSIS — F64.0 GENDER DYSPHORIA IN ADOLESCENT AND ADULT: ICD-10-CM
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
## 2020-03-20 ENCOUNTER — TELEPHONE (OUTPATIENT)
Dept: OTHER | Facility: OUTPATIENT CENTER | Age: 30
End: 2020-03-20

## 2020-03-20 DIAGNOSIS — F41.0 PANIC ATTACKS: ICD-10-CM

## 2020-03-20 DIAGNOSIS — F31.70 BIPOLAR AFFECTIVE DISORDER IN REMISSION (H): ICD-10-CM

## 2020-03-20 RX ORDER — GABAPENTIN 300 MG/1
CAPSULE ORAL
Qty: 90 CAPSULE | Refills: 2 | Status: CANCELLED | OUTPATIENT
Start: 2020-03-20

## 2020-03-20 RX ORDER — VENLAFAXINE HYDROCHLORIDE 150 MG/1
300 CAPSULE, EXTENDED RELEASE ORAL DAILY
Qty: 180 CAPSULE | Refills: 0 | Status: CANCELLED | OUTPATIENT
Start: 2020-03-20

## 2020-03-20 RX ORDER — VENLAFAXINE HYDROCHLORIDE 150 MG/1
300 CAPSULE, EXTENDED RELEASE ORAL DAILY
Qty: 180 CAPSULE | Refills: 0 | Status: SHIPPED | OUTPATIENT
Start: 2020-03-20 | End: 2020-09-18

## 2020-03-20 RX ORDER — LAMOTRIGINE 200 MG/1
200 TABLET ORAL DAILY
Qty: 90 TABLET | Refills: 0 | Status: SHIPPED | OUTPATIENT
Start: 2020-03-20 | End: 2020-09-17

## 2020-03-20 RX ORDER — LAMOTRIGINE 200 MG/1
200 TABLET ORAL DAILY
Qty: 90 TABLET | Refills: 0 | Status: CANCELLED | OUTPATIENT
Start: 2020-03-20

## 2020-03-20 RX ORDER — GABAPENTIN 300 MG/1
CAPSULE ORAL
Qty: 360 CAPSULE | Refills: 0 | Status: SHIPPED | OUTPATIENT
Start: 2020-03-20 | End: 2020-08-05

## 2020-03-23 ENCOUNTER — VIRTUAL VISIT (OUTPATIENT)
Dept: OTHER | Facility: OUTPATIENT CENTER | Age: 30
End: 2020-03-23
Payer: COMMERCIAL

## 2020-03-23 DIAGNOSIS — F64.0 GENDER DYSPHORIA IN ADOLESCENT AND ADULT: ICD-10-CM

## 2020-03-23 DIAGNOSIS — F43.10 PTSD (POST-TRAUMATIC STRESS DISORDER): Primary | ICD-10-CM

## 2020-04-08 ENCOUNTER — VIRTUAL VISIT (OUTPATIENT)
Dept: OTHER | Facility: OUTPATIENT CENTER | Age: 30
End: 2020-04-08
Payer: COMMERCIAL

## 2020-04-08 DIAGNOSIS — F64.0 GENDER DYSPHORIA IN ADOLESCENT AND ADULT: Primary | ICD-10-CM

## 2020-04-08 DIAGNOSIS — F43.10 PTSD (POST-TRAUMATIC STRESS DISORDER): ICD-10-CM
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
## 2020-04-22 ENCOUNTER — TELEPHONE (OUTPATIENT)
Dept: OTHER | Facility: OUTPATIENT CENTER | Age: 30
End: 2020-04-22

## 2020-04-22 NOTE — TELEPHONE ENCOUNTER
Client sent this therapist a mychart message explaining that there was a technology issue when trying to connect to the video session this morning. Client stated he received therapist's voicemail though. Therapist responded to client's mychart message and informed client that therapist would call to check in.     Therapist called client. No answer. Therapist left a voicemail and apologized for the technology issue. Therapist offered to rescheduled appt if client is interested. Requested that client send a mychart message if he does want to reschedule and to provide dates/times that he can be reached (to help with scheduling). Therapist also reminded client of next appt that is currently scheduled (May 11th at 1 pm).     TRINI Baldwin, PhD LP

## 2020-04-22 NOTE — TELEPHONE ENCOUNTER
Therapist called client twice during appt time period. No answer for both calls. Left a voicemail requesting return call or mychart message. In the message, therapist also noted client's next scheduled appt.     TRINI Baldwin, PhD LP
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
## 2020-04-29 ENCOUNTER — VIRTUAL VISIT (OUTPATIENT)
Dept: OTHER | Facility: OUTPATIENT CENTER | Age: 30
End: 2020-04-29
Payer: COMMERCIAL

## 2020-04-29 DIAGNOSIS — F43.10 PTSD (POST-TRAUMATIC STRESS DISORDER): Primary | ICD-10-CM

## 2020-04-29 DIAGNOSIS — F64.0 GENDER DYSPHORIA IN ADOLESCENT AND ADULT: ICD-10-CM

## 2020-05-11 ENCOUNTER — TELEPHONE (OUTPATIENT)
Dept: OTHER | Facility: OUTPATIENT CENTER | Age: 30
End: 2020-05-11
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
## 2020-05-11 NOTE — TELEPHONE ENCOUNTER
Therapist attempted to call client twice during appt time period when he had not joined video session. Client did not answer. Therapist left a voicemail requesting return call or [employer] message about missed appt. Therapist also reminded client of his next scheduled appt.     TRINI Baldwin, PhD LP

## 2020-05-13 ENCOUNTER — OFFICE VISIT (OUTPATIENT)
Dept: URGENT CARE | Facility: URGENT CARE | Age: 30
End: 2020-05-13
Payer: COMMERCIAL

## 2020-05-13 VITALS
OXYGEN SATURATION: 95 % | HEART RATE: 90 BPM | RESPIRATION RATE: 12 BRPM | TEMPERATURE: 98 F | BODY MASS INDEX: 30.06 KG/M2 | SYSTOLIC BLOOD PRESSURE: 111 MMHG | DIASTOLIC BLOOD PRESSURE: 78 MMHG | HEIGHT: 70 IN | WEIGHT: 210 LBS

## 2020-05-13 DIAGNOSIS — R30.0 DYSURIA: Primary | ICD-10-CM

## 2020-05-13 LAB
ALBUMIN UR-MCNC: NEGATIVE MG/DL
APPEARANCE UR: CLEAR
BILIRUB UR QL STRIP: NEGATIVE
COLOR UR AUTO: YELLOW
GLUCOSE UR STRIP-MCNC: NEGATIVE MG/DL
HGB UR QL STRIP: ABNORMAL
KETONES UR STRIP-MCNC: NEGATIVE MG/DL
LEUKOCYTE ESTERASE UR QL STRIP: ABNORMAL
NITRATE UR QL: NEGATIVE
NON-SQ EPI CELLS #/AREA URNS LPF: NORMAL /LPF
PH UR STRIP: 6 PH (ref 5–7)
RBC #/AREA URNS AUTO: NORMAL /HPF
SOURCE: ABNORMAL
SP GR UR STRIP: 1.02 (ref 1–1.03)
UROBILINOGEN UR STRIP-ACNC: 0.2 EU/DL (ref 0.2–1)
WBC #/AREA URNS AUTO: NORMAL /HPF

## 2020-05-13 PROCEDURE — 87491 CHLMYD TRACH DNA AMP PROBE: CPT | Performed by: PHYSICIAN ASSISTANT

## 2020-05-13 PROCEDURE — 87591 N.GONORRHOEAE DNA AMP PROB: CPT | Performed by: PHYSICIAN ASSISTANT

## 2020-05-13 PROCEDURE — 81001 URINALYSIS AUTO W/SCOPE: CPT | Performed by: PHYSICIAN ASSISTANT

## 2020-05-13 PROCEDURE — 99213 OFFICE O/P EST LOW 20 MIN: CPT | Performed by: PHYSICIAN ASSISTANT

## 2020-05-13 PROCEDURE — 87086 URINE CULTURE/COLONY COUNT: CPT | Performed by: PHYSICIAN ASSISTANT

## 2020-05-13 ASSESSMENT — ENCOUNTER SYMPTOMS
DYSURIA: 1
NAUSEA: 0
SHORTNESS OF BREATH: 0
ABDOMINAL PAIN: 0
MYALGIAS: 0
DIARRHEA: 0
VOMITING: 0
FEVER: 0
FLANK PAIN: 0
FREQUENCY: 1
CHILLS: 0
HEMATURIA: 0

## 2020-05-13 ASSESSMENT — MIFFLIN-ST. JEOR: SCORE: 1767.8

## 2020-05-13 NOTE — PATIENT INSTRUCTIONS
We will let you know if the urine culture is positive and get you started on an antibiotic at that time if needed.  Avoid caffeine and carbonated beverages, drink plenty of water.

## 2020-05-13 NOTE — PROGRESS NOTES
HPI  May 13, 2020    HPI: Parveen Kaplan is a 29 year old adult who complains of dysuria, cloudy urine, & urinary frequency onset 5 days ago. No treatments tried. No known alleviating or aggravating factors. Symptoms are moderate in severity & constant in duration. Denies hematuria, vaginal discharge, genital sores, abd pain, flank pain, N/V/D, fever/chills, and any other symptoms.    Past Medical History:   Diagnosis Date     Acne      Past Surgical History:   Procedure Laterality Date     TRANSGENDER MASTECTOMY Bilateral 11/30/2016    Procedure: TRANSGENDER MASTECTOMY;  Surgeon: Senait Gaines MD;  Location:  OR     Social History     Tobacco Use     Smoking status: Never Smoker     Smokeless tobacco: Never Used   Substance Use Topics     Alcohol use: Yes     Comment: SOCIAL     Drug use: Not Currently       Problem list, Medication list, Allergies, and Medical/Social/Surgical histories reviewed in [facility] and updated as appropriate.      Review of Systems   Constitutional: Negative for chills and fever.   Respiratory: Negative for shortness of breath.    Cardiovascular: Negative for chest pain.   Gastrointestinal: Negative for abdominal pain, diarrhea, nausea and vomiting.   Genitourinary: Positive for dysuria and frequency. Negative for flank pain and hematuria.   Musculoskeletal: Negative for myalgias.   All other systems reviewed and are negative.        Physical Exam  Vitals signs and nursing note reviewed.   Constitutional:       Appearance: Normal appearance.   Cardiovascular:      Rate and Rhythm: Normal rate and regular rhythm.   Pulmonary:      Effort: Pulmonary effort is normal.      Breath sounds: Normal breath sounds.   Abdominal:      Palpations: Abdomen is soft.      Tenderness: There is no abdominal tenderness. There is no right CVA tenderness or left CVA tenderness.   Skin:     General: Skin is warm and dry.   Neurological:      Mental Status: He is oriented to person, place, and time.  "      Vital Signs  /78   Pulse 90   Temp 98  F (36.7  C) (Oral)   Resp 12   Ht 1.778 m (5' 10\")   Wt 95.3 kg (210 lb)   SpO2 95%   BMI 30.13 kg/m       Diagnostic Test Results:  Results for orders placed or performed in visit on 05/13/20 (from the past 24 hour(s))   *UA reflex to Microscopic and Culture ([facility] (except [place] and [place])    Specimen: Midstream Urine   Result Value Ref Range    Color Urine Yellow     Appearance Urine Clear     Glucose Urine Negative NEG^Negative mg/dL    Bilirubin Urine Negative NEG^Negative    Ketones Urine Negative NEG^Negative mg/dL    Specific Gravity Urine 1.025 1.003 - 1.035    Blood Urine Trace (A) NEG^Negative    pH Urine 6.0 5.0 - 7.0 pH    Protein Albumin Urine Negative NEG^Negative mg/dL    Urobilinogen Urine 0.2 0.2 - 1.0 EU/dL    Nitrite Urine Negative NEG^Negative    Leukocyte Esterase Urine Trace (A) NEG^Negative    Source Midstream Urine    Urine Microscopic   Result Value Ref Range    WBC Urine 0 - 5 OTO5^0 - 5 /HPF    RBC Urine O - 2 OTO2^O - 2 /HPF    Squamous Epithelial /LPF Urine Few FEW^Few /LPF       ASSESSMENT/PLAN      ICD-10-CM    1. Dysuria  R30.0 *UA reflex to Microscopic and Culture ([facility] (except [facility])     NEISSERIA GONORRHOEA PCR     CHLAMYDIA TRACHOMATIS PCR     Urine Microscopic     Urine Culture Aerobic Bacterial      UA not convincing for UTI; offered to start empiric abx but pt prefers to wait until urine culture is resulted. Avoid bladder irritants, drink plenty of water.      I have discussed any lab or imaging results, the patient's diagnosis, and my plan of treatment with the patient and/or family. Patient is aware to come back in if with worsening symptoms or if no relief despite treatment plan.  Patient voiced understanding and had no further questions.       Follow Up: Return in about 1 week (around 5/20/2020) for Follow up w/ primary care provider if not " better.    JESSICA Shay, PA-C  [facility]

## 2020-05-14 LAB
C TRACH DNA SPEC QL NAA+PROBE: NEGATIVE
N GONORRHOEA DNA SPEC QL NAA+PROBE: NEGATIVE
SPECIMEN SOURCE: NORMAL
SPECIMEN SOURCE: NORMAL

## 2020-05-15 LAB
BACTERIA SPEC CULT: NORMAL
BACTERIA SPEC CULT: NORMAL
SPECIMEN SOURCE: NORMAL

## 2020-05-28 ENCOUNTER — VIRTUAL VISIT (OUTPATIENT)
Dept: OTHER | Facility: OUTPATIENT CENTER | Age: 30
End: 2020-05-28
Payer: COMMERCIAL

## 2020-05-28 DIAGNOSIS — F43.10 PTSD (POST-TRAUMATIC STRESS DISORDER): ICD-10-CM

## 2020-05-28 DIAGNOSIS — F64.0 GENDER DYSPHORIA IN ADOLESCENT AND ADULT: Primary | ICD-10-CM
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
## 2020-07-08 ENCOUNTER — TELEPHONE (OUTPATIENT)
Dept: PSYCHIATRY | Facility: CLINIC | Age: 30
End: 2020-07-08

## 2020-07-08 ENCOUNTER — VIRTUAL VISIT (OUTPATIENT)
Dept: OTHER | Facility: OUTPATIENT CENTER | Age: 30
End: 2020-07-08
Payer: COMMERCIAL

## 2020-07-08 DIAGNOSIS — F64.0 GENDER DYSPHORIA IN ADOLESCENT AND ADULT: ICD-10-CM

## 2020-07-08 DIAGNOSIS — F43.10 PTSD (POST-TRAUMATIC STRESS DISORDER): Primary | ICD-10-CM

## 2020-07-08 ASSESSMENT — PATIENT HEALTH QUESTIONNAIRE - PHQ9
SUM OF ALL RESPONSES TO PHQ QUESTIONS 1-9: 10
5. POOR APPETITE OR OVEREATING: MORE THAN HALF THE DAYS

## 2020-07-08 ASSESSMENT — ANXIETY QUESTIONNAIRES
3. WORRYING TOO MUCH ABOUT DIFFERENT THINGS: NEARLY EVERY DAY
1. FEELING NERVOUS, ANXIOUS, OR ON EDGE: MORE THAN HALF THE DAYS
5. BEING SO RESTLESS THAT IT IS HARD TO SIT STILL: MORE THAN HALF THE DAYS
2. NOT BEING ABLE TO STOP OR CONTROL WORRYING: SEVERAL DAYS
GAD7 TOTAL SCORE: 13
IF YOU CHECKED OFF ANY PROBLEMS ON THIS QUESTIONNAIRE, HOW DIFFICULT HAVE THESE PROBLEMS MADE IT FOR YOU TO DO YOUR WORK, TAKE CARE OF THINGS AT HOME, OR GET ALONG WITH OTHER PEOPLE: VERY DIFFICULT
7. FEELING AFRAID AS IF SOMETHING AWFUL MIGHT HAPPEN: MORE THAN HALF THE DAYS
6. BECOMING EASILY ANNOYED OR IRRITABLE: SEVERAL DAYS

## 2020-07-08 NOTE — LETTER
2020    INSURER: Payor: GERARD / Plan: GERARD NELSON / Product Type: HMO /   ATTN: Member salazar and hakan  Re: Prior Authorization Request  Patient: Parveen Kaplan  Policy ID#:  [id number]  : 1990      To Whom it May Concern:    I am writing to formally request a prior authorization of coverage for my patient,  Parveen Kaplan, for treatment using venlafaxine (EFFEXOR-XR) 150 MG 24 hr capsule.     The patient has been stable on the requested dose since 17.  The patient has not had adequate response at lower doses and continues to have severe depression. Even though 300mg daily is higher than FDA recommended dose, it is the standard in psychiatric treatment in the community to use this dose for treatment of Major Depressive Disorder. Patient's depression and anxiety is currently being treated with a combination of Effexor XR, Lamictal, Gabapentin, and rare hydroxyzine use.  There is no clinical indication to adjust dosing.    The patient has had inadequate response or adverse effects to previous medication trials, including bupropion, mirtazapine, paroxetine, buspirone, prazosin, and trazodone.    Please continue to authorize the requested dose as a dose decrease may result in decompensation of patient.    Sincerely,        Dayan Blackmon MD
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
## 2020-07-08 NOTE — TELEPHONE ENCOUNTER
Central Prior Authorization Team   Phone: [phone]    Current P/A Approval is effective until 07/22/2020 (see encounter from 07/18/19)       PA Initiation    Medication: venlafaxine (EFFEXOR-XR) 150 MG 24 hr capsule   Insurance Company: [employer] - Phone [phone] Fax [phone]  Pharmacy Filling the Rx: [id number] [address] AT [facility] [address]  Filling Pharmacy Phone: [phone]  Filling Pharmacy Fax: [phone]  Start Date: 7/8/2020

## 2020-07-09 ASSESSMENT — ANXIETY QUESTIONNAIRES: GAD7 TOTAL SCORE: 13

## 2020-07-14 NOTE — TELEPHONE ENCOUNTER
PRIOR AUTHORIZATION DENIED - Have not received P/A denial. Called insurance, spoke to Pricilla, she has refaxed the denial letter to us and I should receive shortly.        Medication: venlafaxine (EFFEXOR-XR) 150 MG 24 hr capsule     Denial Date: 7/8/2020    Denial Rational: letter to come    Appeal Information: letter to come

## 2020-07-15 NOTE — TELEPHONE ENCOUNTER
Called insurance ([phone]) again for denial letter as I haven't received it yet, spoke to Michelle who will refax, again.

## 2020-07-16 NOTE — TELEPHONE ENCOUNTER
PRIOR AUTHORIZATION DENIED    Medication: venlafaxine (EFFEXOR-XR) 150 MG 24 hr capsule     Denial Date: 7/8/2020    Denial Rational: This dose has a P/A approval last year, so we may want to appeal this if you would like to keep her on same dose. (This came to the P/A team as an automatic p/a approval request directly from insurance)          Appeal Information:

## 2020-07-16 NOTE — TELEPHONE ENCOUNTER
"Called insurance ([phone]) again for denial letter as I haven't received it yet, spoke to Carly who will refax, again. She explained that the fax# for the provider when it was first set up in their system was not marked as secure, so despite my entering it on our end, it wouldn't go out.     According to the denial reason, \"quantitites greater than 34 per 34 days would be reviewable when doses greater than 225mg AND is NOW increasing to more than 300mg daily\". Well, she isn't increasing, as she has been on this dose. I'm thinking we will want to appeal this denial, but will wait for the letter. Case# [id number], appeals will go to [facility] and not [employer].  "

## 2020-07-27 NOTE — TELEPHONE ENCOUNTER
Writer initiated a quantity override PA via CoverMyMeds. Key=[id number].      [employer] is unable to retrieve the clinical questions that must be submitted to initiate the PA request. Please see more information at the bottom of the page for next steps    Additional Information Required  PA was already submitted for this patient and drug which was denied.;CaseId:[id number];Status:Denied;Appeal Information: Attention:UCARE COMPLAINTS, APPEALS, AND GRIEVANCES [facility] [address],[place], [place],[address] Phone:[phone];      Writer composed appeal letter, faxed to [phone].      From 7/18/19 PA request:  Approved 7/22/19-7/22/20.    From 6/13/18 PA request:    Previously Tried and Failed Therapies:   *pt has only been seen at this clinic since 5/2017 so not all records are available  Wellbutrin XL (unknown- 5/2017) increased anxiety, Remeron (prior to 5/2017) ineffective, Paxil (prior to 5/2017) caused SI, Buspar (2016-6/2017) ineffective, Prazosin (prior to 5/2017) too sedating, Trazodone (unknown date) ineffective for mood     Clinical:  Pt has been stable on the requested dose since 11/13/17.   Pt has not had adequate response at lower doses and continues to have severe depression. Even though 300mg daily is higher than FDA recommended dose, it is the standard in psychiatric treatment in the community to use this dose for treatment of Major Depressive Disorder. Pt's depression and anxiety is currently being treated with a combination of Effexor XR, Lamictal, Gabapentin, and rare hydroxyzine use.  There is no clinical indication to adjust dosing.  Please continue to authorize as a dose decrease may result in decompensation of pt.

## 2020-08-03 DIAGNOSIS — F41.0 PANIC ATTACKS: ICD-10-CM

## 2020-08-05 NOTE — TELEPHONE ENCOUNTER
Appeal Case# [id number] is approved 06/29/2020 - 07/29/2021 (2/day dosing). Pharmacy notified via voicemail, and asked to let patient know when rx is ready.    Medication: venlafaxine (EFFEXOR-XR) 150 MG 24 hr capsule   Authorization Effective Date:  06/29/2020  Authorization Expiration Date:  07/29/2021  Approved Dose/Quantity: 2/day  Reference #: [id number]  Insurance Company: [employer] - Phone [phone] Fax [phone]  Which Pharmacy is filling the prescription (Not needed for infusion/clinic administered): [id number] - [place] [address] AT [facility] [address]

## 2020-08-05 NOTE — TELEPHONE ENCOUNTER
Medication requested: GABAPENTIN 300MG CAPSULES   Last refilled: 5/26/20  Qty: 360      Last seen: 1/15/20  RTC: 3 months  Cancel: 0  No-show: 0  Next appt: 0    Refill decision:   Refill pended and routed to the provider for review/determination due to   Pt outside of RTC timeframe.  Scheduling has been notified to contact the pt for appointment.

## 2020-08-06 RX ORDER — GABAPENTIN 300 MG/1
CAPSULE ORAL
Qty: 150 CAPSULE | Refills: 0 | Status: SHIPPED | OUTPATIENT
Start: 2020-08-06 | End: 2020-09-30

## 2020-08-11 ENCOUNTER — TELEPHONE (OUTPATIENT)
Dept: FAMILY MEDICINE | Facility: CLINIC | Age: 30
End: 2020-08-11

## 2020-08-11 ENCOUNTER — VIRTUAL VISIT (OUTPATIENT)
Dept: FAMILY MEDICINE | Facility: CLINIC | Age: 30
End: 2020-08-11
Payer: COMMERCIAL

## 2020-08-11 DIAGNOSIS — J45.990 EXERCISE-INDUCED ASTHMA: ICD-10-CM

## 2020-08-11 DIAGNOSIS — Z92.89 HISTORY OF PSYCHIATRIC CARE: ICD-10-CM

## 2020-08-11 DIAGNOSIS — R53.83 FATIGUE, UNSPECIFIED TYPE: Primary | ICD-10-CM

## 2020-08-11 PROCEDURE — 99214 OFFICE O/P EST MOD 30 MIN: CPT | Mod: 95 | Performed by: INTERNAL MEDICINE

## 2020-08-11 RX ORDER — ALBUTEROL SULFATE 90 UG/1
2 AEROSOL, METERED RESPIRATORY (INHALATION) EVERY 6 HOURS
Qty: 1 INHALER | Refills: 1 | Status: SHIPPED | OUTPATIENT
Start: 2020-08-11 | End: 2020-10-15

## 2020-08-11 NOTE — TELEPHONE ENCOUNTER
Please advise patient/send to my chart to do the following labs she requested:  CBC,  TSH with free T4,  Vitamin D,  CMP,  Cortisol a.m. level  Diagnosis fatigue

## 2020-08-11 NOTE — PROGRESS NOTES
"Parveen Kaplan is a 30 year old adult who is being evaluated via a billable telephone visit.      The patient has been notified of following:     \"This telephone visit will be conducted via a call between you and your physician/provider. We have found that certain health care needs can be provided without the need for a physical exam.  This service lets us provide the care you need with a short phone conversation.  If a prescription is necessary we can send it directly to your pharmacy.  If lab work is needed we can place an order for that and you can then stop by our lab to have the test done at a later time.    Telephone visits are billed at different rates depending on your insurance coverage. During this emergency period, for some insurers they may be billed the same as an in-person visit.  Please reach out to your insurance provider with any questions.    If during the course of the call the physician/provider feels a telephone visit is not appropriate, you will not be charged for this service.\"    Patient has given verbal consent for Telephone visit?  Yes    What phone number would you like to be contacted at? [phone]    How would you like to obtain your AVS? Lizetthart    Subjective     Parveen Kaplan is a 30 year old adult who presents via phone visit today for the following health issues:    HPI    Fatigue  Patient presenting for evaluation of fatigue ongoing for a while now.  Patient described he is not in particular depressed, has decreased energy level, patient sleeps solid 8 hours.  Patient describes occasional snoring but no known apneas.  Patient receives weekly testosterone injection, takes Effexor XR 2 of [150] mg tablet, gabapentin takes 900 mg in the morning and 1 or 2 during the day.  Diagnosed with bipolar type II disorder, describes he follows with [facility] psychiatry.  Uses hydroxyzine as needed for panic attack and sleep.  Describes more fatigue than drowsiness " "symptoms occurs more in the afternoon.  Patient denies palpitations fever chills or rashes or joint aches.  No recent URI symptoms.  No diarrhea.  Patient describes getting short winded doing basic things mainly with exertion; not known to have allergies.  No known history of asthma.  Denies any associated cough or shortness of breath at rest.  No history of postnasal drip.  No history of seizures.  Wellbutrin made him more depressed so was switched to Effexor.  He is exercising mainly a couple walks per week.    Patient Active Problem List   Diagnosis     Migraine     Panic attacks     Depression with anxiety     History of psychiatric care     Past Surgical History:   Procedure Laterality Date     TRANSGENDER MASTECTOMY Bilateral 11/30/2016    Procedure: TRANSGENDER MASTECTOMY;  Surgeon: Senait Gaines MD;  Location: [facility] OR       Social History     Tobacco Use     Smoking status: Never Smoker     Smokeless tobacco: Never Used   Substance Use Topics     Alcohol use: Yes     Comment: 2-3 drinks per months     Family History   Problem Relation Age of Onset     Depression Mother      Depression Brother      Dialysis Other         MATERNAL UNCLE         Current Outpatient Medications   Medication Sig Dispense Refill     albuterol (PROAIR HFA/PROVENTIL HFA/VENTOLIN HFA) 108 (90 Base) MCG/ACT inhaler Inhale 2 puffs into the lungs every 6 hours 1 Inhaler 1     gabapentin (NEURONTIN) 300 MG capsule Take 3 capsules (900 mg) by mouth daily. May also take 1 capsule (300 mg) 2 times daily as needed (anxiety). For more refills,schedule an appt 150 capsule 0     hydrOXYzine (ATARAX) 25 MG tablet Take 1 tablet (25 mg) by mouth every 8 hours as needed for anxiety 30 tablet 1     lamoTRIgine (LAMICTAL) 200 MG tablet Take 1 tablet (200 mg) by mouth daily 90 tablet 0     ondansetron (ZOFRAN) 4 MG tablet Take 1 tablet (4 mg) by mouth every 8 hours as needed for nausea 12 tablet 0     syringe/needle, disp, 25G X 1\" 1 ML MISC To " use with weekly IM injection 10 each 3     testosterone (TESTOPEL) 75 MG subcutaneous implant pellet 825 mg by Subdermal route every 30 days       venlafaxine (EFFEXOR-XR) 150 MG 24 hr capsule Take 2 capsules (300 mg) by mouth daily 180 capsule 0     naloxone (NARCAN) 4 MG/0.1ML nasal spray Spray 1 spray (4 mg) into one nostril alternating nostrils as needed for opioid reversal every 2-3 minutes until assistance arrives (Patient not taking: Reported on 8/11/2020) 0.2 mL 11     Allergies   Allergen Reactions     Imitrex [Sumatriptan] Shortness Of Breath     Recent Labs   Lab Test 08/30/19  0946 02/02/16  1019 08/14/15  0759 07/03/14  0947 07/03/14  0909   A1C 4.7  --   --   --   --    *  --  109.0  --  95.0   HDL 40*  --  43.0*  --  54.0   TRIG 125  --  59.0  --  70.0   ALT  --  27 19.0 31  --    TSH 2.32  --   --   --   --       BP Readings from Last 3 Encounters:   05/13/20 111/78   02/28/20 122/80   01/15/20 121/78    Wt Readings from Last 3 Encounters:   05/13/20 95.3 kg (210 lb)   02/28/20 89.8 kg (198 lb)   10/11/19 93.4 kg (206 lb)                    Reviewed and updated as needed this visit by Provider  Tobacco  Problems  Med Hx  Surg Hx  Fam Hx  Soc Hx        Review of Systems   Review of systems reviewed as per history and present illness, rest of 10 point review of systems is negative.        Objective   Reported vitals:  There were no vitals taken for this visit.   healthy, alert and no distress  PSYCH: Alert and oriented times 3; coherent speech, normal   rate and volume, able to articulate logical thoughts, able   to abstract reason, no tangential thoughts, no hallucinations   or delusions  His affect is normal  RESP: No cough, no audible wheezing, able to talk in full sentences  Remainder of exam unable to be completed due to telephone visits    Diagnostic Test Results:  Labs reviewed in [facility]        Parveen was seen today for fatigue.    Diagnoses and all orders for this visit:    Fatigue,  unspecified type    Exercise-induced asthma  -     albuterol (PROAIR HFA/PROVENTIL HFA/VENTOLIN HFA) 108 (90 Base) MCG/ACT inhaler; Inhale 2 puffs into the lungs every 6 hours    History of psychiatric care    Discussed differential of fatigue; currently denies being depressed, patient is on medication that can cause drowsiness such as gabapentin and Lamictal.  He needs to discuss with psychiatry possible change of dose or decrease dose.  Advised to decrease the gabapentin to 600 mg in the morning from 900 dose and can increase to take an extra 300 mg dose in the afternoon.  May also change Lamictal to 100 mg twice daily instead of 200 once daily, he will need to discuss with psychiatry.  Advised Wellbutrin would have been a better option for fatigue and decreased energy though he cannot tolerate the Wellbutrin made him more depressed.  Encourage increase exercise activities that will boost his energy and help with depression symptoms.  We discussed him about his being short winded with minimal exertion possible exercise-induced bronchospasm/asthma advised him to start using albuterol as needed demonstrated exercise activities and as needed.  If any persistent or worsening shortness of breath/dyspnea on exertion despite albuterol therapy then will need further evaluation.  He does have adequate sleep hygiene advised to watch for any apneas.  We will repeat some basic labs including CBC TSH cortisol a.m. CMP.  Patient gets testosterone injection on regular basis, advised of side effects.  He does not have any systemic signs or symptoms of infectious process.  Further recommendation pending lab results.  He will get his labs drawn outside clinic.    No follow-ups on file.      Phone call duration: 21 minutes 23 seconds    Cris Blanc MD

## 2020-08-11 NOTE — TELEPHONE ENCOUNTER
Tried to reach patient to sched labs for him.  Mail Box is full.    Checked with lab regarding the Cortisol am. Patient to be sched at 8am whatever day works best for him. No other lab  restrictions     Shamika Benites MA

## 2020-08-13 ENCOUNTER — VIRTUAL VISIT (OUTPATIENT)
Dept: FAMILY MEDICINE | Facility: OTHER | Age: 30
End: 2020-08-13

## 2020-08-13 NOTE — PROGRESS NOTES
"Date: 2020 15:07:18  Clinician: Ej Barrera  Clinician NPI: [id number]  Patient: Parveen Kaplan  Patient : 1990  Patient Address: [address] [place] [address]  Patient Phone: [phone]  Visit Protocol: URI  Patient Summary:  Parveen is a 30 year old ( : 1990 ) male who initiated a Visit for COVID-19 (Coronavirus) evaluation and screening. When asked the question \"Please sign me up to receive news, health information and promotions. \", Parveen responded \"No\".    Parveen states his symptoms started gradually 3-4 days ago.   His symptoms consist of a headache, nausea, tooth pain, a cough, nasal congestion, vomiting, rhinitis, myalgia, and malaise. He is experiencing mild difficulty breathing with activities but can speak normally in full sentences.   Symptom details     Nasal secretions: The color of his mucus is white.    Cough: Parveen coughs a few times an hour and his cough is not more bothersome at night. Phlegm comes into his throat when he coughs. He believes his cough is caused by post-nasal drip. The color of the phlegm is yellow.     Headache: He states the headache is moderate (4-6 on a 10 point pain scale).     Tooth pain: The tooth pain is caused by a cavity, recent dental work, or other mouth problems.      Parveen denies having ear pain, chills, enlarged lymph nodes, sore throat, ageusia, diarrhea, anosmia, facial pain or pressure, fever, and wheezing. He also denies taking antibiotic medication in the past month, double sickening (worsening symptoms after initial improvement), and having recent facial or sinus surgery in the past 60 days.   Precipitating events  He has not recently been exposed to someone with influenza. Parveen has been in close contact with the following high risk individuals: children under the age of 5.   Pertinent COVID-19 (Coronavirus) information  In the past 14 days, Parveen has not worked in a congregate living setting.   He does not work or " volunteer as healthcare worker or a  and does not work or volunteer in a healthcare facility.   Parveen also has not lived in a congregate living setting in the past 14 days. He does not live with a healthcare worker.   Parveen has not had a close contact with a laboratory-confirmed COVID-19 patient within 14 days of symptom onset.   Since December 2019, Parveen and has not had upper respiratory infection or influenza-like illness. Has not been diagnosed with lab-confirmed COVID-19 test   Pertinent medical history  Parveen does not need a return to work/school note.   Weight: 215 lbs   Parveen does not smoke or use smokeless tobacco.   Additional information as reported by the patient (free text): I have mild breathing issues but these existed before Covid pandemic. Recently have been given an inhaler for mild asthma.   Weight: 215 lbs    MEDICATIONS: ibuprofen oral, gabapentin oral, lamotrigine oral, venlafaxine oral, testosterone cypionate intramuscular, Probiotic-Digestive Enzymes oral, ALLERGIES: Imitrex  Clinician Response:  Dear Parveen,   Your symptoms show that you may have coronavirus (COVID-19). This illness can cause fever, cough and trouble breathing. Many people get a mild case and get better on their own. Some people can get very sick.  What should I do?  We would like to test you for this virus.   1. Please call [phone] to schedule your visit. Explain that you were referred by [facility] to have a COVID-19 test. Be ready to share your [facility] visit ID number.  The following will serve as your written order for this COVID Test, ordered by me, for the indication of suspected COVID [Z20.828]: The test will be ordered in [employer], our electronic health record, after you are scheduled. It will show as ordered and authorized by Joon Ledesma MD.  Order: COVID-19 (Coronavirus) PCR for SYMPTOMATIC testing from [facility].      2. When it's time for your COVID test:  Stay at least 6 feet away from others. (If  "someone will drive you to your test, stay in the backseat, as far away from the  as you can.)   Cover your mouth and nose with a mask, tissue or washcloth.  Go straight to the testing site. Don't make any stops on the way there or back.      3.Starting now: Stay home and away from others (self-isolate) until:   You've had no fever---and no medicine that reduces fever---for one full day (24 hours). And...   Your other symptoms have gotten better. For example, your cough or breathing has improved. And...   At least 10 days have passed since your symptoms started.       During this time, don't leave the house except for testing or medical care.   Stay in your own room, even for meals. Use your own bathroom if you can.   Stay away from others in your home. No hugging, kissing or shaking hands. No visitors.  Don't go to work, school or anywhere else.    Clean \"high touch\" surfaces often (doorknobs, counters, handles, etc.). Use a household cleaning spray or wipes. You'll find a full list of  on the EPA website: www.epa.gov/pesticide-registration/list-n-disinfectants-use-against-sars-cov-2.   Cover your mouth and nose with a mask, tissue or washcloth to avoid spreading germs.  Wash your hands and face often. Use soap and water.  Caregivers in these groups are at risk for severe illness due to COVID-19:  o People 65 years and older  o People who live in a nursing home or long-term care facility  o People with chronic disease (lung, heart, cancer, diabetes, kidney, liver, immunologic)  o People who have a weakened immune system, including those who:   Are in cancer treatment  Take medicine that weakens the immune system, such as corticosteroids  Had a bone marrow or organ transplant  Have an immune deficiency  Have poorly controlled HIV or AIDS  Are obese (body mass index of 40 or higher)  Smoke regularly   o Caregivers should wear gloves while washing dishes, handling laundry and cleaning bedrooms and " bathrooms.  o Use caution when washing and drying laundry: Don't shake dirty laundry, and use the warmest water setting that you can.  o For more tips, go to www.cdc.gov/coronavirus/2019-ncov/downloads/10Things.pdf.    4.Sign up for [employer]. We know it's scary to hear that you might have COVID-19. We want to track your symptoms to make sure you're okay over the next 2 weeks. Please look for an email from [employer]---this is a free, online program that we'll use to keep in touch. To sign up, follow the link in the email. Learn more at [email or web address]  How can I take care of myself?   Get lots of rest. Drink extra fluids (unless a doctor has told you not to).   Take Tylenol (acetaminophen) for fever or pain. If you have liver or kidney problems, ask your family doctor if it's okay to take Tylenol.   Adults can take either:    650 mg (two 325 mg pills) every 4 to 6 hours, or...   1,000 mg (two 500 mg pills) every 8 hours as needed.    Note: Don't take more than 3,000 mg in one day. Acetaminophen is found in many medicines (both prescribed and over-the-counter medicines). Read all labels to be sure you don't take too much.   For children, check the Tylenol bottle for the right dose. The dose is based on the child's age or weight.    If you have other health problems (like cancer, heart failure, an organ transplant or severe kidney disease): Call your specialty clinic if you don't feel better in the next 2 days.       Know when to call 911. Emergency warning signs include:    Trouble breathing or shortness of breath Pain or pressure in the chest that doesn't go away Feeling confused like you haven't felt before, or not being able to wake up Bluish-colored lips or face.  Where can I get more information?    [facility] -- About COVID-19: [email or web address]   CDC -- What to Do If You're Sick: www.cdc.gov/coronavirus/2019-ncov/about/steps-when-sick.html   CDC -- Ending Home  Isolation: www.cdc.gov/coronavirus/2019-ncov/hcp/disposition-in-home-patients.html   CDC -- Caring for Someone: www.cdc.gov/coronavirus/2019-ncov/if-you-are-sick/care-for-someone.html   [facility] -- Interim Guidance for Hospital Discharge to Home: www.health.[facility].mn./diseases/coronavirus/hcp/hospdischarge.pdf   [facility] clinical trials (COVID-19 research studies): clinicalaffairs.[facility]/n-clinical-trials    Below are the COVID-19 hotlines at the [place] Department of Health ([facility]). Interpreters are available.    For health questions: Call 465-223-3314 or 1-693.835.6350 (7 a.m. to 7 p.m.) For questions about schools and childcare: Call 706-265-7793 or 1-490.732.9380 (7 a.m. to 7 p.m.)    Diagnosis: Acute upper respiratory infection, unspecified  Diagnosis ICD: J06.9  Additional Clinician Notes: If your symptoms are not improving or worsen, please go to one of our urgent care locations for evaluation.

## 2020-09-15 ENCOUNTER — MYC MEDICAL ADVICE (OUTPATIENT)
Dept: OTHER | Facility: OUTPATIENT CENTER | Age: 30
End: 2020-09-15

## 2020-09-17 DIAGNOSIS — F31.70 BIPOLAR AFFECTIVE DISORDER IN REMISSION (H): ICD-10-CM

## 2020-09-17 RX ORDER — LAMOTRIGINE 200 MG/1
200 TABLET ORAL DAILY
Qty: 90 TABLET | Refills: 0 | Status: SHIPPED | OUTPATIENT
Start: 2020-09-17 | End: 2020-09-30

## 2020-09-17 NOTE — TELEPHONE ENCOUNTER
Requested Medication: Lamorigine  Dose: 200mg  Quantity: 90  Refills: 0    Take 1 (200mg) tablet daily    Last seen at [facility]: 1/15 - 3 mo follow up  Next Appointment with Provider: 9/30    Rossana Beasley CMA

## 2020-09-18 DIAGNOSIS — F31.70 BIPOLAR AFFECTIVE DISORDER IN REMISSION (H): ICD-10-CM

## 2020-09-18 DIAGNOSIS — F41.0 PANIC ATTACKS: ICD-10-CM

## 2020-09-18 RX ORDER — VENLAFAXINE HYDROCHLORIDE 150 MG/1
300 CAPSULE, EXTENDED RELEASE ORAL DAILY
Qty: 180 CAPSULE | Refills: 0 | Status: SHIPPED | OUTPATIENT
Start: 2020-09-18 | End: 2021-02-03

## 2020-09-18 NOTE — TELEPHONE ENCOUNTER
Last Seen 08/28/19    RTC 2-3 months    Cancel 0  No-Show 0    Next Appt Not Scheduled    Incoming Refill From [facility] via Fax    Medication Requested Venlafaxine 150 mg ER Capsules    Directions Take 2 Capsules by mouth daily    Qty 180     Last Refill 05/26/20 Qty 180 with no refills       Medication Refill Pended Per Refill Protocol

## 2020-09-23 ENCOUNTER — TELEPHONE (OUTPATIENT)
Dept: OTHER | Facility: OUTPATIENT CENTER | Age: 30
End: 2020-09-23

## 2020-09-23 NOTE — TELEPHONE ENCOUNTER
Therapist called client twice during appointment time period. No answer for both calls. Left a voicemail requesting return call or [employer] message regarding no show.

## 2020-09-30 ENCOUNTER — VIRTUAL VISIT (OUTPATIENT)
Dept: OTHER | Facility: OUTPATIENT CENTER | Age: 30
End: 2020-09-30
Payer: COMMERCIAL

## 2020-09-30 DIAGNOSIS — F41.0 PANIC ATTACKS: Primary | ICD-10-CM

## 2020-09-30 DIAGNOSIS — F43.10 PTSD (POST-TRAUMATIC STRESS DISORDER): ICD-10-CM

## 2020-09-30 DIAGNOSIS — F31.70 BIPOLAR AFFECTIVE DISORDER IN REMISSION (H): ICD-10-CM

## 2020-09-30 RX ORDER — LAMOTRIGINE 200 MG/1
200 TABLET ORAL DAILY
Qty: 90 TABLET | Refills: 0 | Status: SHIPPED | OUTPATIENT
Start: 2020-09-30 | End: 2021-02-03

## 2020-09-30 RX ORDER — GABAPENTIN 300 MG/1
300 CAPSULE ORAL 3 TIMES DAILY
Qty: 270 CAPSULE | Refills: 0 | Status: SHIPPED | OUTPATIENT
Start: 2020-09-30 | End: 2021-01-12

## 2020-09-30 NOTE — PATIENT INSTRUCTIONS
-Continue on current medications for now  -Send [employer] message after you complete your lab at [facility]    -If lab is relatively normal, follow up in 6 months, but if lab is significantly abnormal, pls follow up immediately.
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
## 2020-10-13 DIAGNOSIS — J45.990 EXERCISE-INDUCED ASTHMA: ICD-10-CM

## 2020-10-13 NOTE — TELEPHONE ENCOUNTER
Refill request:    ALBUTEROL INH (200 PUFFS) 8.5 GM    Summary: Inhale 2 puffs into the lungs every 6 hours, Disp-1 Inhaler,R-1, E-Prescribe  Pharmacy may dispense brand covered by insurance (Proair, or proventil or ventolin or generic albuterol inhaler)     Dose, Route, Frequency: 2 puff, Inhalation, EVERY 6 HOURS  Start: 8/11/2020  Ord/Sold: 8/11/2020

## 2020-10-15 ENCOUNTER — TELEPHONE (OUTPATIENT)
Dept: FAMILY MEDICINE | Facility: CLINIC | Age: 30
End: 2020-10-15

## 2020-10-15 RX ORDER — ALBUTEROL SULFATE 90 UG/1
2 AEROSOL, METERED RESPIRATORY (INHALATION) EVERY 6 HOURS
Qty: 1 INHALER | Refills: 0 | Status: SHIPPED | OUTPATIENT
Start: 2020-10-15

## 2020-10-15 NOTE — TELEPHONE ENCOUNTER
Prescription approved per [facility] Refill Protocol.  Kaia Vital RN

## 2020-10-15 NOTE — TELEPHONE ENCOUNTER
Q1: 3  Q2: 3  Q3: 5  Q4: 5  Q5: 4  Total: 20  Emergency visits: none  Hospitalizations: none     Godfrey Vera CMA on 10/15/2020 at 10:27 AM

## 2020-10-16 ASSESSMENT — ASTHMA QUESTIONNAIRES: ACT_TOTALSCORE: 20

## 2020-10-19 ENCOUNTER — VIRTUAL VISIT (OUTPATIENT)
Dept: PSYCHOLOGY | Facility: CLINIC | Age: 30
End: 2020-10-19
Payer: COMMERCIAL

## 2020-10-19 DIAGNOSIS — F43.10 PTSD (POST-TRAUMATIC STRESS DISORDER): Primary | ICD-10-CM

## 2020-10-19 DIAGNOSIS — F64.0 GENDER DYSPHORIA IN ADOLESCENT AND ADULT: ICD-10-CM

## 2020-10-19 PROCEDURE — 99207 PR NO CHARGE LOS: CPT | Performed by: PSYCHOLOGIST

## 2020-10-19 PROCEDURE — 90837 PSYTX W PT 60 MINUTES: CPT | Mod: 95 | Performed by: PSYCHOLOGIST

## 2020-10-26 ENCOUNTER — MYC MEDICAL ADVICE (OUTPATIENT)
Dept: PSYCHOLOGY | Facility: CLINIC | Age: 30
End: 2020-10-26

## 2020-10-26 ENCOUNTER — VIRTUAL VISIT (OUTPATIENT)
Dept: PSYCHOLOGY | Facility: CLINIC | Age: 30
End: 2020-10-26
Payer: COMMERCIAL

## 2020-10-26 DIAGNOSIS — F64.0 GENDER DYSPHORIA IN ADOLESCENT AND ADULT: ICD-10-CM

## 2020-10-26 DIAGNOSIS — F43.10 PTSD (POST-TRAUMATIC STRESS DISORDER): Primary | ICD-10-CM

## 2020-10-26 PROCEDURE — 99207 PR NO CHARGE LOS: CPT | Performed by: PSYCHOLOGIST

## 2020-10-26 PROCEDURE — 90832 PSYTX W PT 30 MINUTES: CPT | Mod: 95 | Performed by: PSYCHOLOGIST

## 2020-10-26 NOTE — TELEPHONE ENCOUNTER
Ayaz Nelson,     I appreciate you sending me this message about the technology issues you were having. I'm glad that we were able to connect for a phone session.     Best,   Gerry

## 2020-11-08 NOTE — PROGRESS NOTES
"[facility] -  Case Progress Note    Date of Service: 10/19/20  Client Name: Parveen Augustine; he/him/his)  YOB: 1990  MRN:  [id number]  Type of Session: Individual  Present in Session: Client only  Number of Minutes: 55    Video start time: 8:04 AM  Video end time: 8:59 AM    Telemedicine Visit: The patient's condition can be safely assessed and treated via synchronous audio and visual telemedicine encounter.      Reason for Telemedicine Visit: COVID-19 Restrictions    Originating Site (Patient Location): Patient's home    Distant Site (Provider Location): Provider Remote Setting    Consent:  The patient/guardian has verbally consented to: the potential risks and benefits of telemedicine (video visit) versus in person care; bill my insurance or make self-payment for services provided; and responsibility for payment of non-covered services.     Mode of Communication:  Video Conference via [employer]    As the provider I attest to compliance with applicable laws and regulations related to telemedicine.    Current Symptoms/Status:  History of gender dysphoria, anxiety, past trauma that affects family relationships, sexual desire concerns, financial stress.     Progress Toward Treatment Goals:   Reported communicating more with partner regarding sexual concerns; reported putting forth effort to improve relationships with family members    Intervention: Modality and Description/Response to Intervention:  CBT, interpersonal, and supportive psychotherapy techniques were used. Client shared about work stress and financial stress. He reported that school is going well and that he was cast in a school play. Client then shared about improvements in some family relationship. Discussed that client is putting in effort to talk with brother more. Talked about uncomfortable interaction with mother. Discussed client's decisions and behaviors in the moment and being able to \"move past\" the " uncomfortable interaction.     Client shared that relationship with partner is going well. Client noted that he has wanted to have sex. Discussed conversations with partner about this and noticing improvements in their sexual relationship and intimacy overall.     Toward session end, client shared about having plans to move out of current residence. Client shared about feeling judged at times by current homeowners and feeling guilt about it. Discussing coping with guilt. Client stated he hopes to move in several months.    Interactive Complexity:  None    Assignment:  Continue self-care    Diagnosis:  Encounter Diagnoses   Name Primary?     PTSD (post-traumatic stress disorder) Yes     Gender dysphoria in adolescent and adult      Plan / Need for Future Services:  Return for individual therapy
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
## 2021-01-04 ENCOUNTER — VIRTUAL VISIT (OUTPATIENT)
Dept: PSYCHOLOGY | Facility: CLINIC | Age: 31
End: 2021-01-04
Payer: COMMERCIAL

## 2021-01-04 DIAGNOSIS — F43.10 PTSD (POST-TRAUMATIC STRESS DISORDER): Primary | ICD-10-CM

## 2021-01-04 PROCEDURE — 90834 PSYTX W PT 45 MINUTES: CPT | Mod: 95 | Performed by: PSYCHOLOGIST

## 2021-01-04 PROCEDURE — 99207 PR NO BILLABLE SERVICE THIS VISIT: CPT | Performed by: PSYCHOLOGIST

## 2021-01-04 PROCEDURE — 90785 PSYTX COMPLEX INTERACTIVE: CPT | Mod: 95 | Performed by: PSYCHOLOGIST

## 2021-01-10 ENCOUNTER — HEALTH MAINTENANCE LETTER (OUTPATIENT)
Age: 31
End: 2021-01-10

## 2021-01-11 ENCOUNTER — MYC MEDICAL ADVICE (OUTPATIENT)
Dept: PSYCHIATRY | Facility: CLINIC | Age: 31
End: 2021-01-11

## 2021-01-12 DIAGNOSIS — F41.0 PANIC ATTACKS: ICD-10-CM

## 2021-01-12 RX ORDER — GABAPENTIN 300 MG/1
300 CAPSULE ORAL 3 TIMES DAILY
Qty: 270 CAPSULE | Refills: 0 | Status: SHIPPED | OUTPATIENT
Start: 2021-01-12 | End: 2021-02-03

## 2021-01-12 NOTE — TELEPHONE ENCOUNTER
Requested Medication: Gabapentin  Dose: 300mg  Quantity: 270  Refills: 0    Take 1 capsule (300mg) capsule three times daily    Last seen at [facility]: 9/30 - 6 mo follow up  Next Appointment with Provider: 2/3/2021      Rossana Beasley CMA

## 2021-01-20 ENCOUNTER — VIRTUAL VISIT (OUTPATIENT)
Dept: PSYCHOLOGY | Facility: CLINIC | Age: 31
End: 2021-01-20
Payer: COMMERCIAL

## 2021-01-20 DIAGNOSIS — F64.0 GENDER DYSPHORIA IN ADOLESCENT AND ADULT: ICD-10-CM

## 2021-01-20 DIAGNOSIS — F43.10 PTSD (POST-TRAUMATIC STRESS DISORDER): Primary | ICD-10-CM

## 2021-01-20 PROCEDURE — 90834 PSYTX W PT 45 MINUTES: CPT | Mod: 95 | Performed by: PSYCHOLOGIST

## 2021-01-20 PROCEDURE — 99207 PR NO BILLABLE SERVICE THIS VISIT: CPT | Performed by: PSYCHOLOGIST

## 2021-01-20 NOTE — PROGRESS NOTES
[facility] -  Case Progress Note    Date of Service: 1/20/21  Client Name: Parveen Augustine; he/him/his)  YOB: 1990  MRN:  [id number]  Type of Session: Individual  Present in Session: Client and therapist  Number of Minutes: 50    Video start time: 1:05 PM  Video end time: 1:55 PM    Telemedicine Visit: The patient's condition can be safely assessed and treated via synchronous audio and visual telemedicine encounter.      Reason for Telemedicine Visit: COVID-19 Restrictions    Originating Site (Patient Location): Patient's home    Distant Site (Provider Location): Provider Remote Setting    Consent:  The patient/guardian has verbally consented to: the potential risks and benefits of telemedicine (video visit) versus in person care; bill my insurance or make self-payment for services provided; and responsibility for payment of non-covered services.     Mode of Communication:  Video Conference via [employer]    As the provider I attest to compliance with applicable laws and regulations related to telemedicine.    Current Symptoms/Status:  History of gender dysphoria, anxiety, past trauma that affects relationships, sexual desire concerns, financial stress.     Progress Toward Treatment Goals:   Reported making progress by talking with primary partner about activation of trauma and other relationship/dating concerns; interest in restarting family therapy sessions (with Dr. Lacey García) when in-person in available    Intervention: Modality and Description/Response to Intervention:  CBT techniques were used throughout session. Client shared that he talked with primary partner about concerns (related to last session). Client stated that he has not talked to other partner yet. Client also reported that he would like discuss boundaries and understanding of each other s relationships for clarity. Processed about client s insecurities as well as feelings of confusion and jealousy. Discussed  what is for client to address and work through on own versus relationship concerns. Client reported that discussion was helpful. Toward session end, client shared that he has had conversations with mother and continues to have a desire to improve relationship. Reported having a desire to return to family therapy sessions (with Dr. Lacey García) when in-person sessions are available.     Interactive Complexity:  None    Assignment:  Continue self-care    Diagnosis:  Encounter Diagnoses   Name Primary?     PTSD (post-traumatic stress disorder) Yes     Gender dysphoria in adolescent and adult      Plan / Need for Future Services:  Return for individual therapy
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
## 2021-02-02 VITALS — BODY MASS INDEX: 30.99 KG/M2 | WEIGHT: 216 LBS

## 2021-02-03 ENCOUNTER — VIRTUAL VISIT (OUTPATIENT)
Dept: PSYCHIATRY | Facility: CLINIC | Age: 31
End: 2021-02-03
Payer: COMMERCIAL

## 2021-02-03 DIAGNOSIS — F31.70 BIPOLAR AFFECTIVE DISORDER IN REMISSION (H): Primary | ICD-10-CM

## 2021-02-03 DIAGNOSIS — F41.1 GAD (GENERALIZED ANXIETY DISORDER): ICD-10-CM

## 2021-02-03 DIAGNOSIS — F43.10 PTSD (POST-TRAUMATIC STRESS DISORDER): ICD-10-CM

## 2021-02-03 DIAGNOSIS — F41.0 PANIC ATTACKS: ICD-10-CM

## 2021-02-03 PROCEDURE — 99214 OFFICE O/P EST MOD 30 MIN: CPT | Mod: 95 | Performed by: NURSE PRACTITIONER

## 2021-02-03 RX ORDER — LAMOTRIGINE 200 MG/1
200 TABLET ORAL DAILY
Qty: 90 TABLET | Refills: 0 | Status: SHIPPED | OUTPATIENT
Start: 2021-02-03 | End: 2021-03-01

## 2021-02-03 RX ORDER — QUETIAPINE FUMARATE 25 MG/1
25-50 TABLET, FILM COATED ORAL
Qty: 60 TABLET | Refills: 1 | Status: SHIPPED | OUTPATIENT
Start: 2021-02-03 | End: 2021-02-17

## 2021-02-03 RX ORDER — VENLAFAXINE HYDROCHLORIDE 150 MG/1
300 CAPSULE, EXTENDED RELEASE ORAL DAILY
Qty: 180 CAPSULE | Refills: 0 | Status: SHIPPED | OUTPATIENT
Start: 2021-02-03 | End: 2021-03-01

## 2021-02-03 RX ORDER — GABAPENTIN 300 MG/1
300 CAPSULE ORAL 3 TIMES DAILY
Qty: 270 CAPSULE | Refills: 0 | Status: SHIPPED | OUTPATIENT
Start: 2021-02-03 | End: 2021-03-01

## 2021-02-03 ASSESSMENT — ANXIETY QUESTIONNAIRES
6. BECOMING EASILY ANNOYED OR IRRITABLE: SEVERAL DAYS
5. BEING SO RESTLESS THAT IT IS HARD TO SIT STILL: SEVERAL DAYS
4. TROUBLE RELAXING: SEVERAL DAYS
3. WORRYING TOO MUCH ABOUT DIFFERENT THINGS: SEVERAL DAYS
GAD7 TOTAL SCORE: 7
GAD7 TOTAL SCORE: 7
2. NOT BEING ABLE TO STOP OR CONTROL WORRYING: SEVERAL DAYS
GAD7 TOTAL SCORE: 7
1. FEELING NERVOUS, ANXIOUS, OR ON EDGE: MORE THAN HALF THE DAYS
7. FEELING AFRAID AS IF SOMETHING AWFUL MIGHT HAPPEN: NOT AT ALL
7. FEELING AFRAID AS IF SOMETHING AWFUL MIGHT HAPPEN: NOT AT ALL

## 2021-02-03 ASSESSMENT — PATIENT HEALTH QUESTIONNAIRE - PHQ9
SUM OF ALL RESPONSES TO PHQ QUESTIONS 1-9: 5
SUM OF ALL RESPONSES TO PHQ QUESTIONS 1-9: 5
10. IF YOU CHECKED OFF ANY PROBLEMS, HOW DIFFICULT HAVE THESE PROBLEMS MADE IT FOR YOU TO DO YOUR WORK, TAKE CARE OF THINGS AT HOME, OR GET ALONG WITH OTHER PEOPLE: SOMEWHAT DIFFICULT

## 2021-02-03 NOTE — PATIENT INSTRUCTIONS
-Start Seroquel 25-50 mg at bedtime as needed for sleep.  If this is not working in 2-3 days, please Mychart message luis so that they can adjust the medication.  -Continue all other medications for now    Your next appointment is scheduled on 2/17/2021 (Wed) at 1pm.  You are now transferred to psychiatry clinic to see luis.  If you need to reschedule, please call [phone].

## 2021-02-03 NOTE — PROGRESS NOTES
"VIDEO VISIT  Parveen Kaplan is a 30 year old patient who is being evaluated via a billable video visit.      The patient has been notified of following:   \"We have found that certain health care needs can be provided without the need for an in-person physical exam. This service lets us provide the care you need with a video conversation. If a prescription is necessary we can send it directly to your pharmacy. If lab work is needed we can place an order for that and you can then stop by our lab to have the test done at a later time. Insurers are generally covering virtual visits as they would in-office visits so billing should not be different than normal.  If for some reason you do get billed incorrectly, you should contact the billing office to correct it and that number is in the AVS .    Patient has given verbal consent for video visit?: Yes   How would you like to obtain your AVS?: [facility]      Video- Visit Details  Type of service:  video visit for medication management  Time of service:    Date:  02/03/2021    Video Start Time:  0900      Video End Time:  0923    Reason for video visit:  Services only offered telehealth due to COVID  Originating Site (patient location):  Patient's home  Distant Site (provider location):  Remote location  Mode of Communication:  Video Conference via [employer].  Last 3 minutes, audio didn't work, so turned into phone only appt.      [facility] Psychiatry Progress Notes       Patient Name: Parveen Kaplan  YOB: 1990  MRN: [id number]  Date of Service:  2/3/2021  Last Seen:9/30/2020    Parveen Kaplna is a 30 year old person assigned female at birth, identifies as male who uses the name Omar and pronoun kareen.      Omar Kaplan is a 30 year old year old adult who is being evaluated via a billable telepsychiatry visit for ongoing psychiatric care.     Omar Kaplan was last seen on 9/30/2020.   At that time,     Medication Ordered/Consults/Labs/tests " "Ordered:      Medication:   -Continue on current medications for now  OTC Recommendations: none  Lab Orders:  -Send [email or web address] message after you complete your lab at [facility]  Referrals: none  Release of Information: none  Future Treatment Considerations:  per symptoms.   Return for Follow Up: in 6 months if lab is relatively WNL, otherwise sooner      Pertinent Background:  Diagnoses include bipolar disorder, PTSD and AKIN.  Psych critical item history includes suicidal ideation, SIB [burning during HS], trauma hx and psych hosp (<3).      PAST MED TRIALS                                                               Medication  Dose (mg) Effect  Dates of Use   Paxil   Increased SI?     Prazosin   Sedation     Mirtazapine   No beneift     Lamictal 250   Current   Gabapentin     Current   Hydroxyzine     Current   Effexor     Current      Buspar, Ativan, Clonidine (tremor)     [All pronouns should read as \"he\"]      Interim History                                                                                                        4, 4     Since the last visit,  -Relatively doing well.  Noted more irritability than usual x1.5 weeks.  Lot of stressed; dropped class this semester, housing situation (new housing option may not be open on time) and cat is sick.  Can stay in current housing until new housing option becomes open.    -Noted terminal insomnia x 1.5 week.  Sleeping 5-6 hours, but waking up earlier and cannot go back to sleep.  Unsure if this is start of hypomania.  Feels tired not sleeping for now.  -Took Hydroxyzine couple times last seek usually in later afternoon or early evening.  Has not taken Hydroxyzine for a while before this.  Taking Gabapentin TID mostly.  -Has not noted any other hypomanic symptoms.  Denies trauma anniversaries or seasonal patterns of hypomania.  Usually holidays are difficult.    Denies any symptoms suggestive of psychosis.    Current Suicidality/Hx of Suicide Attempts: " Denies both  CoCominent Medical concerns:Denies    Medication Side Effects: The patient denies all medication side effects.      Medical Review of Systems     Apart from the symptoms mentioned int he HPI, the 14 point review of systems, including constitutional, HEENT, cardiovascular, respiratory, gastrointestinal, genitourinary, musculoskeletal, integumentary, endocrine, neurological, hematologic and allergic is entirely negative.    Pregnant: None. Nursing: None, Contraception: partner not producing sperm    Substance Use   Denies frequent or abuse of alcohol. Social drinking rarely, when he drinks less than 2-3 drinks. Denies any other substance use.     Social/ Family History                                  [per patient report]                                 1ea,1ea   Living arrangements: living with partner in his parent's home and feels safe  Social Support: fiancee, family  Access to gun: denies    Allergy                                Imitrex [sumatriptan]    Current Medications                                                                                                       Current Outpatient Medications   Medication Sig Dispense Refill     albuterol (PROAIR HFA/PROVENTIL HFA/VENTOLIN HFA) 108 (90 Base) MCG/ACT inhaler Inhale 2 puffs into the lungs every 6 hours 1 Inhaler 0     gabapentin (NEURONTIN) 300 MG capsule Take 1 capsule (300 mg) by mouth 3 times daily 270 capsule 0     hydrOXYzine (ATARAX) 25 MG tablet Take 1 tablet (25 mg) by mouth every 8 hours as needed for anxiety 30 tablet 1     lamoTRIgine (LAMICTAL) 200 MG tablet Take 1 tablet (200 mg) by mouth daily 90 tablet 0     naloxone (NARCAN) 4 MG/0.1ML nasal spray Spray 1 spray (4 mg) into one nostril alternating nostrils as needed for opioid reversal every 2-3 minutes until assistance arrives 0.2 mL 11     ondansetron (ZOFRAN) 4 MG tablet Take 1 tablet (4 mg) by mouth every 8 hours as needed for nausea 12 tablet 0     syringe/needle, disp,  "25G X 1\" 1 ML MISC To use with weekly IM injection 10 each 3     testosterone (TESTOPEL) 75 MG subcutaneous implant pellet 825 mg by Subdermal route every 30 days       venlafaxine (EFFEXOR-XR) 150 MG 24 hr capsule Take 2 capsules (300 mg) by mouth daily 180 capsule 0        Mental Status Exam                                                                                   9, 14 cog        Alertness: alert  and oriented  Appearance:  Casually dressed and Adequately groomed  Behavior/Demeanor: cooperative, pleasant and calm  Speech: regular rate and rhythm  Mood :  \"irritable slightly\" and \"okay\"  Affect: mostly full range; was congruent to mood; was congruent to content  Thought Process (Associations):  Logical, Linear and Goal directed  Thought process (Rate):  Normal  Thought content:  no overt psychosis, denies suicidal ideation, intent or thoughts and patient does not appear to be responding to internal stimuli  Perception:  Reports none;  Denies depersonalization and derealization  Attention/Concentration:  Normal  Memory:  Immediate recall intact, Short-term memory intact and Long-term memory intact  Language: intact  Fund of Knowledge/Intelligence:  Average  Abstraction:  Normal  Insight:  Good  Judgment:  Good  Cognition: (6) does  appear grossly intact; formal cognitive testing was not done    Physical Exam     Motor activity/EPS:  Normal  Psychomotor: normal or unremarkable      Labs and Results      Pertinent findings on review include: Review of records with relevant information reported in the HPI.  Reviewed pt's past medical record and obtained collateral information.    [place] PRESCRIPTION MONITORING PROGRAM [] was checked today:  indicates Gabapentin 1/17, 10/3.    Answers for HPI/ROS submitted by the patient on 2/3/2021   If you checked off any problems, how difficult have these problems made it for you to do your work, take care of things at home, or get along with other people?: Somewhat " difficult  PHQ9 TOTAL SCORE: 5  AKIN 7 TOTAL SCORE: 7      PHQ9 Today:  5  PHQ 10/17/2019 1/15/2020 7/8/2020   PHQ-9 Total Score 7 5 10   Q9: Thoughts of better off dead/self-harm past 2 weeks Not at all Not at all Not at all       GAD7: 7  AKIN-7 SCORE 10/17/2019 1/15/2020 7/8/2020   Total Score 9 9 13   Total Score - - -       No lab results found.  Recent Labs   Lab Test 02/02/16  1019 08/14/15  0759 07/03/14  0947   AST 19 22.0 28   ALT 27 19.0 31   ALKPHOS 86  --  53     6/8/2020: ALT 82, 11/19/2020 ALT 63       PSYCHOTROPIC DRUG INTERACTIONS:    Hydroxyzine + Effexor increases risk of QTc prolongation     MANAGEMENT:  Monitoring for adverse effects, pt aware of risks, routine EKG, minimum use of hydroxyzine       Assessment     Omar Kaplan is a 30 year old adult  who presents for med management follow up.  Pt appears mostly stable in his mood and anxiety, denies SI, SIB or HI.  However, pt noted difficulties with terminal insomnia and irritability x 1.5 week.  Also appears he is feeling more anxious as he has taken PRN Hydroxyzine which he has not taken for a while.  Pt did not report any other hypomanic symptoms and could not tell if he is feeling this way.  As pt is having difficulties with sleep, discussed immediate intervention for sleep difficulties as this could lead to hypomania.  Discussed different medication options for terminal insomnia in context of BPAD and pt chose Seroquel 25-50 mg HS PRN.  Discussed if this is not helpful in few days to contact this writer.  May need to reduce Effexor though pt has been stable on current medication regimen.  Also reviewed most recent lab, ALT is trending down, but still slightly elevated.  Will continue to monitor while continuing all other medications for now.    Pt will be transferred back to psychiatry clinic due to schedule difficulties.      Diagnosis                                                                    Bipolar 2 disorder, in partial  remission  PTSD  AKIN    Treatment Recommendation and Plan       Medication Ordered/Consults/Labs/tests Ordered:     Medication:   -Start Seroquel 25-50 mg at bedtime as needed for sleep.  If this is not working in 2-3 days, please Mychart message luis so that they can adjust the medication.  -Continue all other medications for now  OTC Recommendations: none  Lab Orders:  none  Referrals: none  Release of Information: none  Future Treatment Considerations:  per symptoms.   Return for Follow Up: in 2 weeks at psychiatry clinic    -Discussed safety plan for suicidal thoughts  -Discussed plan for suicidality  -Discussed available emergency services  -Patient agrees with the treatment plan  -Encouraged to continue outpatient therapy to gain more coping mechanism for stress.      Treatment Risk Statement: Discussed with the patient my impressions, as well as recommended studies. I educated patient on the differential diagnosis and prognosis. I discussed with the patient the risks and benefits of medications versus no interventions, including efficacy, dose, possible side effects and length of treatment and the importance of medication compliance.  The patient understands the risks, benefits, adverse effects and alternatives. Agrees to treatment with the capacity to do so. No medical contraindications to treatment. The patient also understands the risks of using street drugs or alcohol. I also discussed the potential metabolic side effects of antipsychotics including weight gain, diabetes and lipid abnormalities, risk of tardive dyskinesia and indicates understanding of this and agrees to regular medical monitoring      CRISIS NUMBERS:   pt declined    Diagnosis or treatment significantly limited by social determinants of health.    29 minutes spent on the date of the encounter doing chart review, history and exam, documentation and further activities as noted above    Luis Blackmon CNP,  2/3/2021

## 2021-02-04 ASSESSMENT — PATIENT HEALTH QUESTIONNAIRE - PHQ9: SUM OF ALL RESPONSES TO PHQ QUESTIONS 1-9: 5

## 2021-02-04 ASSESSMENT — ANXIETY QUESTIONNAIRES: GAD7 TOTAL SCORE: 7

## 2021-02-17 ENCOUNTER — VIRTUAL VISIT (OUTPATIENT)
Dept: PSYCHIATRY | Facility: CLINIC | Age: 31
End: 2021-02-17
Attending: NURSE PRACTITIONER
Payer: COMMERCIAL

## 2021-02-17 DIAGNOSIS — F64.9 GENDER IDENTITY DISORDER: ICD-10-CM

## 2021-02-17 PROCEDURE — 99443 PR PHYSICIAN TELEPHONE EVALUATION 21-30 MIN: CPT | Mod: 95 | Performed by: NURSE PRACTITIONER

## 2021-02-17 RX ORDER — HYDROXYZINE HYDROCHLORIDE 25 MG/1
25 TABLET, FILM COATED ORAL EVERY 8 HOURS PRN
Qty: 30 TABLET | Refills: 1 | Status: SHIPPED | OUTPATIENT
Start: 2021-02-17 | End: 2022-06-07

## 2021-02-17 ASSESSMENT — PATIENT HEALTH QUESTIONNAIRE - PHQ9
SUM OF ALL RESPONSES TO PHQ QUESTIONS 1-9: 5
10. IF YOU CHECKED OFF ANY PROBLEMS, HOW DIFFICULT HAVE THESE PROBLEMS MADE IT FOR YOU TO DO YOUR WORK, TAKE CARE OF THINGS AT HOME, OR GET ALONG WITH OTHER PEOPLE: SOMEWHAT DIFFICULT
SUM OF ALL RESPONSES TO PHQ QUESTIONS 1-9: 5

## 2021-02-17 ASSESSMENT — PAIN SCALES - GENERAL: PAINLEVEL: MODERATE PAIN (4)

## 2021-02-17 NOTE — PROGRESS NOTES
"The author of this note documented a reason for not sharing it with the patient.    VIDEO VISIT  Parveen Kaplan is a 30 year old patient who is being evaluated via a billable video visit.      The patient has been notified of following:   \"This video visit will be conducted via a call between you and your physician/provider. We have found that certain health care needs can be provided without the need for an in-person physical exam. This service lets us provide the care you need with a video conversation. If a prescription is necessary we can send it directly to your pharmacy. If lab work is needed we can place an order for that and you can then stop by our lab to have the test done at a later time. Insurers are generally covering virtual visits as they would in-office visits so billing should not be different than normal.  If for some reason you do get billed incorrectly, you should contact the billing office to correct it and that number is in the AVS .    Video Conference to be completed via:  [employer]    Patient has given verbal consent for video visit?:  Yes    Patient would prefer that any video invitations be sent by: Send to e-mail at: [email or web address]      How would patient like to obtain AVS?:  [employer]    AVS SmartPhrase [PsychAVS] has been placed in 'Patient Instructions':  Yes     Start Time:  1303         End Time: 1319    Telemedicine Visit: The patient's condition can be safely assessed and treated via synchronous audio and visual telemedicine encounter.      Reason for Telemedicine Visit: Due to COVID 19 pandemic, clinic switching all appointments to telemedicine     Originating Site (Patient Location): Patient's home    Distant Site (Provider Location): Provider Remote Setting    Consent:  The patient/guardian has verbally consented to: the potential risks and benefits of telemedicine (video visit) versus in person care; bill my insurance or make self-payment for services provided; and " "responsibility for payment of non-covered services.     Mode of Communication:  Video Conference via Unable to complete video visit, thus changed to phone only visit.    As the provider I attest to compliance with applicable laws and regulations related to telemedicine.    Psychiatry Clinic Progress Note                                                                  Patient Name: Parveen Kaplan  YOB: 1990  MRN: [id number]  Date of Service:  2/17/2021  Last Seen:2/3/2021    Parveen Kaplan is a 30 year old person assigned female at birth, identifies as male who uses the name Omar and pronoun kareen.      Omar Kaplan is a 30 year old year old adult who is being evaluated via a billable telepsychiatry visit for ongoing psychiatric care.      Omar Kaplan was last seen on 2/3/2021.    At that time,     Medication Ordered/Consults/Labs/tests Ordered:      Medication:   -Start Seroquel 25-50 mg at bedtime as needed for sleep.  If this is not working in 2-3 days, please Mychart message [facility] so that they can adjust the medication.  -Continue all other medications for now  OTC Recommendations: none  Lab Orders:  none  Referrals: none  Release of Information: none  Future Treatment Considerations:  per symptoms.   Return for Follow Up: in 2 weeks at psychiatry clinic    Pertinent Background:  Diagnoses include bipolar disorder, PTSD and AKIN.  Psych critical item history includes suicidal ideation, SIB [burning during HS], trauma hx and psych hosp (<3).      PAST MED TRIALS                                                               Medication  Dose (mg) Effect  Dates of Use   Paxil   Increased SI?     Prazosin   Sedation     Mirtazapine   No beneift     Lamictal 250   Current   Gabapentin     Current   Hydroxyzine     Current   Effexor     Current      Buspar, Ativan, Clonidine (tremor), Seroquel (oversedation at 25mg, nausea, dizziness)     [All pronouns should read as \"he\"]      Interim History       "                                                                                                  4, 4     Since the last visit,  -Tried Seroquel for few days, but discontinued as he slept 16 hours, felt dizzy and had nausea.  Notes sleep medication tends to have this type of reaction for him.  -Even without Seroquel, sleeping better, 7 hours/night, but feels somewhat not rested.  Has not tried Hydroxyzine for sleep in the past.  -Has not taken Hydroxyzine for anxiety either.  Notes anxiety has not exacerbated.  -Mood is better, less irritability though this come and go.  -Denies any other hypomanic behavior, noted he created safety net if he wants to do impulsive purchase to put them on wish list    Denies any symptoms suggestive of hypomania or psychosis.    Current Suicidality/Hx of Suicide Attempts: Denies both  CoCominent Medical concerns:Denies    Medication Side Effects: The patient denies all medication side effects.      Medical Review of Systems     Apart from the symptoms mentioned int he HPI, the 14 point review of systems, including constitutional, HEENT, cardiovascular, respiratory, gastrointestinal, genitourinary, musculoskeletal, integumentary, endocrine, neurological, hematologic and allergic is entirely negative.    Pregnant: None. Nursing: None, Contraception: partner not producing sperm    Substance Use   Denies frequent or abuse of alcohol. Social drinking rarely, when he drinks less than 2-3 drinks. Denies any other substance use.     Social/ Family History                                  [per patient report]                                 1ea,1ea   Living arrangements: living with partner in his parent's home and feels safe  Social Support: fiancee, family  Access to gun: denies    Allergy                                Imitrex [sumatriptan]    Current Medications                                                                                                       Current Outpatient  "Medications   Medication Sig Dispense Refill     albuterol (PROAIR HFA/PROVENTIL HFA/VENTOLIN HFA) 108 (90 Base) MCG/ACT inhaler Inhale 2 puffs into the lungs every 6 hours 1 Inhaler 0     gabapentin (NEURONTIN) 300 MG capsule Take 1 capsule (300 mg) by mouth 3 times daily 270 capsule 0     hydrOXYzine (ATARAX) 25 MG tablet Take 1 tablet (25 mg) by mouth every 8 hours as needed for anxiety 30 tablet 1     lamoTRIgine (LAMICTAL) 200 MG tablet Take 1 tablet (200 mg) by mouth daily 90 tablet 0     naloxone (NARCAN) 4 MG/0.1ML nasal spray Spray 1 spray (4 mg) into one nostril alternating nostrils as needed for opioid reversal every 2-3 minutes until assistance arrives 0.2 mL 11     ondansetron (ZOFRAN) 4 MG tablet Take 1 tablet (4 mg) by mouth every 8 hours as needed for nausea 12 tablet 0     QUEtiapine (SEROQUEL) 25 MG tablet Take 1-2 tablets (25-50 mg) by mouth nightly as needed (sleep) 60 tablet 1     syringe/needle, disp, 25G X 1\" 1 ML MISC To use with weekly IM injection 10 each 3     testosterone (TESTOPEL) 75 MG subcutaneous implant pellet 825 mg by Subdermal route every 30 days       venlafaxine (EFFEXOR-XR) 150 MG 24 hr capsule Take 2 capsules (300 mg) by mouth daily 180 capsule 0        Mental Status Exam                                                                                   9, 14 cog        Alertness: alert  and oriented  Behavior/Demeanor: cooperative, pleasant and calm  Speech: normal  Mood :  \"okay\"  Affect: mostly full range; was congruent to mood; was congruent to content  Thought Process (Associations):  Logical, Linear and Goal directed  Thought process (Rate):  Normal  Thought content:  no overt psychosis, denies suicidal ideation, intent or thoughts and patient does not appear to be responding to internal stimuli  Perception:  Reports none;  Denies depersonalization and derealization  Attention/Concentration:  Normal  Memory:  Immediate recall intact and Short-term memory " intact  Language: intact  Fund of Knowledge/Intelligence:  Average  Abstraction:  Normal  Insight:  Good  Judgment:  Good      Labs and Results      Pertinent findings on review include: Review of records with relevant information reported in the HPI.  Reviewed pt's past medical record and obtained collateral information.      [place] MONITOR[place] [] was checked today:  indicates Gabapentin 1/17.  Answers for HPI/ROS submitted by the patient on 2/17/2021   If you checked off any problems, how difficult have these problems made it for you to do your work, take care of things at home, or get along with other people?: Somewhat difficult  PHQ9 TOTAL SCORE: 5    PHQ9 Today:  5  PHQ 1/15/2020 7/8/2020 2/3/2021   PHQ-9 Total Score 5 10 5   Q9: Thoughts of better off dead/self-harm past 2 weeks Not at all Not at all Not at all       AKIN 7 Today: N/A  AKIN-7 SCORE 1/15/2020 7/8/2020 2/3/2021   Total Score - - 7 (mild anxiety)   Total Score 9 13 7   Total Score - - -       No lab results found.  Recent Labs   Lab Test 02/02/16  1019 08/14/15  0759 07/03/14  0947   AST 19 22.0 28   ALT 27 19.0 31   ALKPHOS 86  --  53     6/8/2020: ALT 82, 11/19/2020 ALT 63        PSYCHOTROPIC DRUG INTERACTIONS:    Hydroxyzine + Effexor increases risk of QTc prolongation     MANAGEMENT:  Monitoring for adverse effects, pt aware of risks, routine EKG, minimum use of hydroxyzine      Impression/Assessment      Omar Kaplan is a 30 year old adult  who presents for med management follow up.  Pt sounds mostly stable in his mood and anxiety, denies SI, SIB or HI.  Pt notes improvement in his sleep, but Seroquel oversedated him with nausea and dizziness and is not taking the medication.  Will discontinue Seroquel as he did not tolerate well.  Though he is sleeping 7 hours/night, still not feeling rested.  Discussed pt may try PRN Hydroxyzine at HS to see if this would improve his sleep. Since pt notes his mood is now better, will  continue all other medications for now while monitoring for hypomanic symptoms.    Diagnosis                                                                    Bipolar 2 disorder, in partial remission  PTSD  AKIN    Treatment Recommendation & Plan       Medication Ordered/Consults/Labs/tests Ordered:     Medication:   -Discontinued Seroquel as you are not taking the medication  -May try Hydroxyzine for sleep  -Continue all other medications for now  OTC Recommendations: none  Lab Orders:  none  Referrals: none  Release of Information: none  Future Treatment Considerations: Per symptoms.   Return for Follow Up: in 1 month    -Discussed safety plan for suicidal thoughts  -Discussed plan for suicidality  -Discussed available emergency services  -Patient agrees with the treatment plan  -Encouraged to continue outpatient therapy to gain more coping mechanism for stress.    Treatment Risk Statement: Discussed with the patient my impressions, as well as recommended studies. I educated patient on the differential diagnosis and prognosis. I discussed with the patient the risks and benefits of medications versus no interventions, including efficacy, dose, possible side effects and length of treatment and the importance of medication compliance.  The patient understands the risks, benefits, adverse effects and alternatives. Agrees to treatment with the capacity to do so. No medical contraindications to treatment. The patient also understands the risks of using street drugs or alcohol.     CRISIS NUMBERS:   Provided routinely in AVS.    Diagnosis or treatment significantly limited by social determinants of health    28 minutes spent on the date of the encounter doing chart review, history and exam, documentation and further activities as noted above      Dayan Blackmon CNP,  2/17/2021

## 2021-02-17 NOTE — PATIENT INSTRUCTIONS
-Discontinued Seroquel as you are not taking the medication  -May try Hydroxyzine for sleep  -Continue all other medications for now    Your next appointment is scheduled on 3/17/2021 (Wed) at 1pm.    **For crisis resources, please see the information at the end of this document**     Patient Education      Thank you for coming to the [facility].    Lab Testing:  If you had lab testing today and your results are reassuring or normal they will be mailed to you or sent through [employer] within 7 days. If the lab tests need quick action we will call you with the results. The phone number we will call with results is # [phone] (home) . If this is not the best number please call our clinic and change the number.    Medication Refills:  If you need any refills please call your pharmacy and they will contact us. Our fax number for refills is [phone]. Please allow three business for refill processing. If you need to  your refill at a new pharmacy, please contact the new pharmacy directly. The new pharmacy will help you get your medications transferred.     Scheduling:  If you have any concerns about today's visit or wish to schedule another appointment please call our office during normal business hours [phone] (8-5:00 M-F)    Contact Us:  Please call [phone] during business hours (8-5:00 M-F).  If after clinic hours, or on the weekend, please call  [phone].    Financial Assistance [phone]  [employer] Billing [phone]  Central Billing Office, [place]: [phone]  [facility] Billing [phone]  Medical Records [phone]  [facility] Patient Bill of Rights [email or web address]       MENTAL HEALTH CRISIS NUMBERS:  For a medical emergency please call  911 or go to the nearest ER.     [facility]:   [facility] -[phone]   Crisis Residence [facility]  [facility] -[phone]   Walk[facility] Center [facility] -[phone]   COPE 24/7 Tra Mobile Team -[phone] (adults)/[phone] (child)  CHILD: [facility] needs assessment team - [phone]      [facility]:   [facility] - [phone]   Walk-in counseling [facility] - [phone]   Walk-in counseling [facility] - [phone]   Crisis Residence [facility] - [phone]  Urgent Care Adult Mental [facility]-[phone] mobile unit/ 24/7 crisis line    National Crisis Numbers:   National Suicide Prevention Lifeline: 0-521-826-TALK (429-900-1343)  Poison Control Center - 1-532-522-0167  [employer]/resources for a list of additional resources (SOS)  Trans Lifeline a hotline for transgender people 1-844.740.2366  The [employer] a hotline for LGBT youth 2-543-435-8617  Crisis Text Line: For any crisis 24/7   To: 465105  see www.crisistextline.org  - IF MAKING A CALL FEELS TOO HARD, send a text!         Again thank you for choosing [facility] MENTAL HEALTH & ADDICTION [facility] and please let us know how we can best partner with you to improve you and your family's health.    You may be receiving a survey regarding this appointment. We would love to have your feedback, both positive and negative. The survey is done by an external company, so your answers are anonymous.

## 2021-02-18 ASSESSMENT — PATIENT HEALTH QUESTIONNAIRE - PHQ9: SUM OF ALL RESPONSES TO PHQ QUESTIONS 1-9: 5

## 2021-03-01 ENCOUNTER — VIRTUAL VISIT (OUTPATIENT)
Dept: PSYCHOLOGY | Facility: CLINIC | Age: 31
End: 2021-03-01
Payer: COMMERCIAL

## 2021-03-01 DIAGNOSIS — F43.10 PTSD (POST-TRAUMATIC STRESS DISORDER): Primary | ICD-10-CM

## 2021-03-01 PROCEDURE — 90837 PSYTX W PT 60 MINUTES: CPT | Mod: 95 | Performed by: PSYCHOLOGIST

## 2021-03-01 PROCEDURE — 99207 PR NO BILLABLE SERVICE THIS VISIT: CPT | Performed by: PSYCHOLOGIST

## 2021-03-08 ENCOUNTER — TELEPHONE (OUTPATIENT)
Dept: FAMILY MEDICINE | Facility: CLINIC | Age: 31
End: 2021-03-08

## 2021-03-08 ENCOUNTER — VIRTUAL VISIT (OUTPATIENT)
Dept: FAMILY MEDICINE | Facility: CLINIC | Age: 31
End: 2021-03-08
Payer: COMMERCIAL

## 2021-03-08 DIAGNOSIS — R42 DIZZINESS: ICD-10-CM

## 2021-03-08 DIAGNOSIS — F32.5 MAJOR DEPRESSION IN COMPLETE REMISSION (H): ICD-10-CM

## 2021-03-08 DIAGNOSIS — R42 LIGHTHEADEDNESS: Primary | ICD-10-CM

## 2021-03-08 PROCEDURE — 99214 OFFICE O/P EST MOD 30 MIN: CPT | Mod: 95 | Performed by: INTERNAL MEDICINE

## 2021-03-08 NOTE — PROGRESS NOTES
"Omar is a 30 year old who is being evaluated via a billable telephone visit.      What phone number would you like to be contacted at? [phone]  How would you like to obtain your AVS? [facility]        Assessment & Plan   Problem List Items Addressed This Visit        Behavioral    Major depression in complete remission (H)    Relevant Orders    Vitamin D Deficiency      Other Visit Diagnoses     Lightheadedness    -  Primary    Relevant Orders    CBC with platelets and differential    Comprehensive metabolic panel (BMP + Alb, Alk Phos, ALT, AST, Total. Bili, TP)    EKG 12-lead complete w/read - Clinics (Completed)    Vitamin D Deficiency    Dizziness        Relevant Orders    CBC with platelets and differential    EKG 12-lead complete w/read - Clinics (Completed)    Vitamin D Deficiency         Patient describing presyncopal episodes orthostatic dizziness, advised to be from dehydration plus or minus side effects of medication mainly gabapentin, is comfortable with current dose of  gabapentin 300 mg twice daily was decreased from 900 dose.  Advised to push more fluids including drinking sports fluids, has Gatorade push more, red salt into diet.  Check labs including kidney electrolytes CBC.  Once he schedules the clinic for labs we will ask RN on call to check his orthostatic blood pressure readings and do an EKG.  As for his GI symptoms alternating constipation diarrhea/loose stools probable IBS.  We may consider referral to GI specialist.  We discussed preventative measures helpful to abort presyncopal episodes.  Patient reiterated understanding and all questions answered.              BMI:   Estimated body mass index is 30.99 kg/m  as calculated from the following:    Height as of 5/13/20: 1.778 m (5' 10\").    Weight as of 2/2/21: 98 kg (216 lb).       Work on weight loss  Regular exercise  See Patient Instructions    No follow-ups on file.    Cris Blanc MD  [facility]    Subjective " "  Omar is a 30 year old who presents for the following health issues   HPI       Wants to check vitamin level,     Has been getting lightheaded, and lethargic,  Wants to see if medication or vit d deficiency    On gabapentin, takes less 300 bid, for anxiety,     On 900 mg was more lethargic drowsiness, prescribed by NP who specializes in psychiatry,,     Made adjustment in meds, it helped, the lightheadedness that he is worried about, and not lethargy, mainly when stands upo gets lightheaded, \"has to hold on things\" , drinks lot of fluids and sometimes forgets,     Takes hydroxyzine, as needed once a month, make him drowsy,     lamictal 200 mg once daily, in morning for mood, for bipolar,  Effexor X 300 mg daily,     He does not eat salt much    Has constipation and diarrhea , alternating, has no regular \"poops fuiber helps    ?IBS causing GI' s  No palpitations , no SOB, sometimes feels presyncope. No syncope, no vertigo    Uses albuterol as needed for exercise for exercise.      Review of Systems   Constitutional, HEENT, cardiovascular, pulmonary, gi and gu systems are negative, except as otherwise noted.      Objective           Vitals:  No vitals were obtained today due to virtual visit.    Physical Exam   healthy, alert and no distress  PSYCH: Alert and oriented times 3; coherent speech, normal   rate and volume, able to articulate logical thoughts, able   to abstract reason, no tangential thoughts, no hallucinations   or delusions  His affect is normal  RESP: No cough, no audible wheezing, able to talk in full sentences  Remainder of exam unable to be completed due to telephone visits    Office Visit on 05/13/2020   Component Date Value Ref Range Status     Color Urine 05/13/2020 Yellow   Final     Appearance Urine 05/13/2020 Clear   Final     Glucose Urine 05/13/2020 Negative  NEG^Negative mg/dL Final     Bilirubin Urine 05/13/2020 Negative  NEG^Negative Final     Ketones Urine 05/13/2020 Negative  " NEG^Negative mg/dL Final     Specific Gravity Urine 05/13/2020 1.025  1.003 - 1.035 Final     Blood Urine 05/13/2020 Trace* NEG^Negative Final     pH Urine 05/13/2020 6.0  5.0 - 7.0 pH Final     Protein Albumin Urine 05/13/2020 Negative  NEG^Negative mg/dL Final     Urobilinogen Urine 05/13/2020 0.2  0.2 - 1.0 EU/dL Final     Nitrite Urine 05/13/2020 Negative  NEG^Negative Final     Leukocyte Esterase Urine 05/13/2020 Trace* NEG^Negative Final     Source 05/13/2020 Midstream Urine   Final     Specimen Descrip 05/13/2020 Urine   Final     N Gonorrhea PCR 05/13/2020 Negative  NEG^Negative Final    Comment: Negative for N. gonorrhoeae rRNA by transcription mediated amplification.  A negative result by transcription mediated amplification does not preclude   the presence of N. gonorrhoeae infection because results are dependent on   proper and adequate collection, absence of inhibitors, and sufficient rRNA to   be detected.       Specimen Description 05/13/2020 Urine   Final     Chlamydia Trachomatis PCR 05/13/2020 Negative  NEG^Negative Final    Comment: Negative for C. trachomatis rRNA by transcription mediated amplification.  A negative result by transcription mediated amplification does not preclude   the presence of C. trachomatis infection because results are dependent on   proper and adequate collection, absence of inhibitors, and sufficient rRNA to   be detected.       WBC Urine 05/13/2020 0 - 5  OTO5^0 - 5 /HPF Final     RBC Urine 05/13/2020 O - 2  OTO2^O - 2 /HPF Final     Squamous Epithelial /LPF Urine 05/13/2020 Few  FEW^Few /LPF Final     Specimen Description 05/13/2020 Midstream Urine   Final     Culture Micro 05/13/2020    Final                    Value:>100,000 colonies/mL  mixed urogenital demetrio       Culture Micro 05/13/2020 Susceptibility testing not routinely done   Final               Phone call duration: 12:30  minutes

## 2021-03-08 NOTE — TELEPHONE ENCOUNTER
No ans, left message to call clinic (or do thru MY CHART) to schedule both a NURSE ONLY APPT and LAB appt for [address].     Cris Blanc MD Doonan, Katherine, CMA    Cc: Cris Blanc MD             Please can we schedule orthostatic blood pressure readings and EKG done when Omar comes into lab for blood work   Thank you        Kiera LOTT MA @ [facility]  (CS LAB  &  NURSE)

## 2021-03-15 ENCOUNTER — VIRTUAL VISIT (OUTPATIENT)
Dept: PSYCHOLOGY | Facility: CLINIC | Age: 31
End: 2021-03-15
Payer: COMMERCIAL

## 2021-03-15 DIAGNOSIS — F64.0 GENDER DYSPHORIA IN ADOLESCENT AND ADULT: Primary | ICD-10-CM

## 2021-03-15 DIAGNOSIS — F43.10 PTSD (POST-TRAUMATIC STRESS DISORDER): ICD-10-CM

## 2021-03-15 PROCEDURE — 99207 PR NO BILLABLE SERVICE THIS VISIT: CPT | Performed by: PSYCHOLOGIST

## 2021-03-15 PROCEDURE — 90834 PSYTX W PT 45 MINUTES: CPT | Mod: 95 | Performed by: PSYCHOLOGIST

## 2021-03-15 NOTE — PROGRESS NOTES
[facility] -  Case Progress Note    Date of Service: 3/01/21  Client Name: Parveen Augustine; he/him/his)  YOB: 1990  MRN:  [id number]  Type of Session: Individual  Present in Session: Client and therapist  Number of Minutes: 55    Video start time: 2:05 PM  Video end time: 3:00 PM    Telemedicine Visit: The patient's condition can be safely assessed and treated via synchronous audio and visual telemedicine encounter.      Reason for Telemedicine Visit: COVID-19 Restrictions    Originating Site (Patient Location): Patient's home    Distant Site (Provider Location): Provider Remote Setting    Consent:  The patient/guardian has verbally consented to: the potential risks and benefits of telemedicine (video visit) versus in person care; bill my insurance or make self-payment for services provided; and responsibility for payment of non-covered services.     Mode of Communication:  Video Conference via [employer]    As the provider I attest to compliance with applicable laws and regulations related to telemedicine.    Current Symptoms/Status:  History of gender dysphoria, anxiety, past trauma that affects relationships, sexual desire concerns, working on boundaries in relationships    Progress Toward Treatment Goals:   Making progress with discussing how past trauma affects current relationships as well as identifying client's boundaries in relationships    Intervention: Modality and Description/Response to Intervention:  Using CBT and motivational interviewing techniques throughout the session. Client stated that he did not made a list of communication points in order to prepare for discussion is one of his partners (T). Client processed about how past trauma and anxiety is affecting current relationship. Discussed that clarifying relationships with each partner could be helpful to him. Therapist and client discussed client's boundaries with partners and what would help client to move  towards having this conversation. Therapist and client then discussed that therapist s position is changing and their availability will be limited. Client stated that he would like a therapist with more availability. Discussed the option of client transferring to Dr. Omer Cotton, and client agreed. Client stated that he is also interested in continuing family therapy with Dr. Lacey García in the future, and therapist explained that he would need to schedule those session with her just as he has in the past. Client then thanked provider for helping them. Therapist informed client that either Dr. Cotton or the  staff will reach out for future scheduling. Therapist and client agreed to keep our last scheduled session.     Interactive Complexity:  None    Assignment:  Continue self-care    Diagnosis:  Encounter Diagnosis   Name Primary?     PTSD (post-traumatic stress disorder) Yes     Plan / Need for Future Services:  Return for individual therapy

## 2021-03-17 ENCOUNTER — TELEPHONE (OUTPATIENT)
Dept: PSYCHIATRY | Facility: CLINIC | Age: 31
End: 2021-03-17

## 2021-03-17 NOTE — TELEPHONE ENCOUNTER
Email and text invite to [facility] sent, no response.  Called and LVM at 1305.  Pt was no show at 1315.  Pt needs to reschedule. Dayan Blackmon, CNP, 3/17/2021

## 2021-04-05 NOTE — PROGRESS NOTES
[facility] -  Case Progress Note    Date of Service: 3/15/21  Client Name: Parveen Augustine; he/him/his)  YOB: 1990  MRN:  [id number]  Type of Session: Individual  Present in Session: Client and therapist  Number of Minutes: 52    Video start time: 2:03 PM  Video end time: 2:55 PM    Telemedicine Visit: The patient's condition can be safely assessed and treated via synchronous audio and visual telemedicine encounter.      Reason for Telemedicine Visit: COVID-19 Restrictions    Originating Site (Patient Location): Patient's home    Distant Site (Provider Location): Provider Remote Setting    Consent:  The patient/guardian has verbally consented to: the potential risks and benefits of telemedicine (video visit) versus in person care; bill my insurance or make self-payment for services provided; and responsibility for payment of non-covered services.     Mode of Communication:  Video Conference via [employer]    As the provider I attest to compliance with applicable laws and regulations related to telemedicine.    Current Symptoms/Status:  History of gender dysphoria, anxiety, past trauma that affects relationships, sexual desire concerns, working on boundaries in relationships    Progress Toward Treatment Goals:   Making progress with discussing how past trauma affects current relationships as well as identifying client's boundaries in relationships    Intervention: Modality and Description/Response to Intervention:  Interpersonal, CBT, solution and strength based interventions were utilized to address client's current status. Client reported that sibling posted something transphobic on social media, resulting in them having an exchange on social media about it. Client reported that he did not feel acknowledged or validated during discussion, which affected the way he was responding. He also reported his past trauma was activated which further affected how he was responding. Client  reported making plans with sibling to further discussion this situation in person instead of online. Discussed what client would like to communicate, approaches to communication, and self-care/coping strategies related to this conversation. Client also reported that he has been getting support from partner.     Given that today is the final session with this provider, time was taken to identify and process client s progress in treatment to date. Client reported feeling proud about the process that he has made in therapy. Therapist informed client that Dr. Omer Cotton will be reaching out to schedule appointments. Toward session end, therapist and client took time to share appreciation and well wishes before ending session. Overall, client was open to feedback, active, and engaged throughout session.    Interactive Complexity:  None    Assignment:  Continue self-care    Diagnosis:  Encounter Diagnoses   Name Primary?     Gender dysphoria in adolescent and adult Yes     PTSD (post-traumatic stress disorder)      Plan / Need for Future Services:  Return for individual therapy session. Final session with this provider; transfer care to Dr. Omer Cotton.

## 2021-04-12 ENCOUNTER — ALLIED HEALTH/NURSE VISIT (OUTPATIENT)
Dept: NURSING | Facility: CLINIC | Age: 31
End: 2021-04-12
Payer: COMMERCIAL

## 2021-04-12 DIAGNOSIS — R42 LIGHTHEADEDNESS: Primary | ICD-10-CM

## 2021-04-12 DIAGNOSIS — F32.5 MAJOR DEPRESSION IN COMPLETE REMISSION (H): ICD-10-CM

## 2021-04-12 DIAGNOSIS — R42 LIGHTHEADEDNESS: ICD-10-CM

## 2021-04-12 DIAGNOSIS — R42 DIZZINESS: ICD-10-CM

## 2021-04-12 LAB
ALBUMIN SERPL-MCNC: 4.5 G/DL (ref 3.4–5)
ALP SERPL-CCNC: 102 U/L (ref 40–150)
ALT SERPL W P-5'-P-CCNC: 107 U/L (ref 0–50)
ANION GAP SERPL CALCULATED.3IONS-SCNC: 2 MMOL/L (ref 3–14)
AST SERPL W P-5'-P-CCNC: 45 U/L (ref 0–45)
BASOPHILS # BLD AUTO: 0 10E9/L (ref 0–0.2)
BASOPHILS NFR BLD AUTO: 0.2 %
BILIRUB SERPL-MCNC: 1 MG/DL (ref 0.2–1.3)
BUN SERPL-MCNC: 16 MG/DL (ref 7–30)
CALCIUM SERPL-MCNC: 9.6 MG/DL (ref 8.5–10.1)
CHLORIDE SERPL-SCNC: 107 MMOL/L (ref 94–109)
CO2 SERPL-SCNC: 32 MMOL/L (ref 20–32)
CREAT SERPL-MCNC: 1.1 MG/DL (ref 0.52–1.04)
DIFFERENTIAL METHOD BLD: ABNORMAL
EOSINOPHIL # BLD AUTO: 0 10E9/L (ref 0–0.7)
EOSINOPHIL NFR BLD AUTO: 0 %
ERYTHROCYTE [DISTWIDTH] IN BLOOD BY AUTOMATED COUNT: 12.1 % (ref 10–15)
GFR SERPL CREATININE-BSD FRML MDRD: 68 ML/MIN/{1.73_M2}
GLUCOSE SERPL-MCNC: 102 MG/DL (ref 70–99)
HCT VFR BLD AUTO: 47.9 % (ref 35–47)
HGB BLD-MCNC: 17.4 G/DL (ref 11.7–15.7)
LYMPHOCYTES # BLD AUTO: 2.1 10E9/L (ref 0.8–5.3)
LYMPHOCYTES NFR BLD AUTO: 41.2 %
MCH RBC QN AUTO: 31.9 PG (ref 26.5–33)
MCHC RBC AUTO-ENTMCNC: 36.3 G/DL (ref 31.5–36.5)
MCV RBC AUTO: 88 FL (ref 78–100)
MONOCYTES # BLD AUTO: 0.4 10E9/L (ref 0–1.3)
MONOCYTES NFR BLD AUTO: 8.4 %
NEUTROPHILS # BLD AUTO: 2.5 10E9/L (ref 1.6–8.3)
NEUTROPHILS NFR BLD AUTO: 50.2 %
PLATELET # BLD AUTO: 236 10E9/L (ref 150–450)
POTASSIUM SERPL-SCNC: 4.1 MMOL/L (ref 3.4–5.3)
PROT SERPL-MCNC: 8 G/DL (ref 6.8–8.8)
RBC # BLD AUTO: 5.46 10E12/L (ref 3.8–5.2)
SODIUM SERPL-SCNC: 141 MMOL/L (ref 133–144)
WBC # BLD AUTO: 5 10E9/L (ref 4–11)

## 2021-04-12 PROCEDURE — 99207 PR NO CHARGE NURSE ONLY: CPT

## 2021-04-12 PROCEDURE — 36415 COLL VENOUS BLD VENIPUNCTURE: CPT | Performed by: INTERNAL MEDICINE

## 2021-04-12 PROCEDURE — 80053 COMPREHEN METABOLIC PANEL: CPT | Performed by: INTERNAL MEDICINE

## 2021-04-12 PROCEDURE — 82306 VITAMIN D 25 HYDROXY: CPT | Performed by: INTERNAL MEDICINE

## 2021-04-12 PROCEDURE — 85025 COMPLETE CBC W/AUTO DIFF WBC: CPT | Performed by: INTERNAL MEDICINE

## 2021-04-12 NOTE — PROGRESS NOTES
EKG was done.   Shamika Benites MA    Parveen Kaplan is a 30 year old patient who comes in today for a Blood Pressure check.  Orthostatic Blood pressure readings documented in Vitals.    Disposition: results routed to provider  Shamika Benites MA

## 2021-04-13 DIAGNOSIS — R79.89 ELEVATED LFTS: Primary | ICD-10-CM

## 2021-04-13 DIAGNOSIS — D75.1 POLYCYTHEMIA: ICD-10-CM

## 2021-04-13 LAB — DEPRECATED CALCIDIOL+CALCIFEROL SERPL-MC: 24 UG/L (ref 20–75)

## 2021-04-13 ASSESSMENT — ASTHMA QUESTIONNAIRES: ACT_TOTALSCORE: 18

## 2021-04-14 ENCOUNTER — VIRTUAL VISIT (OUTPATIENT)
Dept: PSYCHIATRY | Facility: CLINIC | Age: 31
End: 2021-04-14
Payer: COMMERCIAL

## 2021-04-14 DIAGNOSIS — F31.31 BIPOLAR AFFECTIVE DISORDER, CURRENTLY DEPRESSED, MILD (H): Primary | ICD-10-CM

## 2021-04-14 DIAGNOSIS — F41.1 GAD (GENERALIZED ANXIETY DISORDER): ICD-10-CM

## 2021-04-14 DIAGNOSIS — F43.10 PTSD (POST-TRAUMATIC STRESS DISORDER): ICD-10-CM

## 2021-04-14 PROCEDURE — 99215 OFFICE O/P EST HI 40 MIN: CPT | Mod: 95 | Performed by: NURSE PRACTITIONER

## 2021-04-14 RX ORDER — LAMOTRIGINE 200 MG/1
300 TABLET ORAL DAILY
Qty: 135 TABLET | Refills: 0 | Status: SHIPPED | OUTPATIENT
Start: 2021-04-14 | End: 2021-05-25

## 2021-04-14 NOTE — PROGRESS NOTES
"VIDEO VISIT  Parveen Kaplan is a 30 year old patient who is being evaluated via a billable video visit.      The patient has been notified of following:   \"We have found that certain health care needs can be provided without the need for an in-person physical exam. This service lets us provide the care you need with a video conversation. If a prescription is necessary we can send it directly to your pharmacy. If lab work is needed we can place an order for that and you can then stop by our lab to have the test done at a later time. Insurers are generally covering virtual visits as they would in-office visits so billing should not be different than normal.  If for some reason you do get billed incorrectly, you should contact the billing office to correct it and that number is in the AVS .    Patient has given verbal consent for video visit?: Yes   How would you like to obtain your AVS?: [employer]      Video- Visit Details  Type of service:  video visit for medication management  Time of service:    Date:  04/14/2021    Video Start Time:0900     Video End Time:  0937    Reason for video visit:  Services only offered telehealth due to COVID  Originating Site (patient location):  Patient's home  Distant Site (provider location):  Remote location  Mode of Communication:  Video Conference via Other: Started with Aleisha, but pt lost audio, thus turned into phone only appointment after 11 min      [facility] Psychiatry Progress Notes       Patient Name: Parveen Kaplan  YOB: 1990  MRN: [id number]  Date of Service:  4/14/2021  Last Seen:2/17/2021    Parveen Kaplan is a 30 year old person assigned female at birth, identifies as male who uses the name Omar and pronoun kareen.       Omar Kaplan is a 30 year old year old adult who is being evaluated via a billable telepsychiatry visit for ongoing psychiatric care.      Omar Kaplan was last seen on 2/17/2021.    At that time,     Medication " "Ordered/Consults/Labs/tests Ordered:      Medication:   -Discontinued Seroquel as you are not taking the medication  -May try Hydroxyzine for sleep  -Continue all other medications for now  OTC Recommendations: none  Lab Orders:  none  Referrals: none  Release of Information: none  Future Treatment Considerations: Per symptoms.   Return for Follow Up: in 1 month    Pertinent Background:  Diagnoses include bipolar disorder, PTSD and AKIN.  Psych critical item history includes suicidal ideation, SIB [burning during HS], trauma hx and psych hosp (<3).     Previous medication trial: Buspar, Ativan, Clonidine (tremor), Seroquel (oversedation at 25mg, nausea, dizziness), Gabapentin (higher than 300 mg TID oversedation), Paxil (increased SI), Prazosin (oversedation), Remeron (not effective)    [All pronouns should read as \"he\"]    Therapist: Omer Cotton  Hormone Care: BALAJI Reed    Interim History                                                                                                        4, 4     On 3/17/2021, pt was no show.    Since the last visit,  -Increased depressed mood, anhedonia x 1.5 weeks.  Thinks this is part of seasonal depression as he experiences more depression in the summer.  Denies SI, SIB or HI.  -Also increased anxiety x 1.5 weeks, usually exacerbates around noon.  Has taken PRN Hydroxyzine x 1 last week.  -Tried higher dose of Gabapentin than current dose, this caused oversedation.  -Asking if cannabis can be prescribed by this writer for insomnia as he find this helpful.  Has been using edible cannabis every night.  Goes to bed midnight-1am, falls asleep easily if he takes edible 1 hour prior to bedtime.  Wakes up 9-10am, sleeping throughout tte night.  Feels rested when he wakes up.  Takes 1.5 hour nap x1-2/week.  Has been sleeping this way x 1 month.  -Find Lamictal and Effexor helpful for management of mood.  -PCP within [facility], hormone care at [facility].  -Not taking Tylenol daily nor " drinking heavy.    Denies any symptoms suggestive of hypomania or psychosis.    Current Suicidality/Hx of Suicide Attempts: Denies both  CoCominent Medical concerns:Denies    Medication Side Effects: The patient denies all medication side effects.      Medical Review of Systems     Apart from the symptoms mentioned int he HPI, the 14 point review of systems, including constitutional, HEENT, cardiovascular, respiratory, gastrointestinal, genitourinary, musculoskeletal, integumentary, endocrine, neurological, hematologic and allergic is entirely negative.    Pregnant: None. Nursing: None, Contraception: partner not producing sperm    Substance Use   Denies frequent or abuse of alcohol. Social drinking rarely, when he drinks less than 2-3 drinks. Denies any other substance use.     Social/ Family History                                  [per patient report]                                 1ea,1ea   Living arrangements: living with partner in his parent's home and feels safe  Social Support: fiancee, family  Access to gun: denies    Allergy                                Imitrex [sumatriptan]    Current Medications                                                                                                       Current Outpatient Medications   Medication Sig Dispense Refill     albuterol (PROAIR HFA/PROVENTIL HFA/VENTOLIN HFA) 108 (90 Base) MCG/ACT inhaler Inhale 2 puffs into the lungs every 6 hours 1 Inhaler 0     gabapentin (NEURONTIN) 300 MG capsule Take 1 capsule (300 mg) by mouth 3 times daily 270 capsule 0     hydrOXYzine (ATARAX) 25 MG tablet Take 1 tablet (25 mg) by mouth every 8 hours as needed for anxiety 30 tablet 1     lamoTRIgine (LAMICTAL) 200 MG tablet Take 1 tablet (200 mg) by mouth daily 90 tablet 0     naloxone (NARCAN) 4 MG/0.1ML nasal spray Spray 1 spray (4 mg) into one nostril alternating nostrils as needed for opioid reversal every 2-3 minutes until assistance arrives 0.2 mL 11     ondansetron  "(ZOFRAN) 4 MG tablet Take 1 tablet (4 mg) by mouth every 8 hours as needed for nausea 12 tablet 0     syringe/needle, disp, 25G X 1\" 1 ML MISC To use with weekly IM injection 10 each 3     testosterone (TESTOPEL) 75 MG subcutaneous implant pellet 825 mg by Subdermal route every 30 days       venlafaxine (EFFEXOR-XR) 150 MG 24 hr capsule Take 2 capsules (300 mg) by mouth daily 180 capsule 0        Mental Status Exam                                                                                   9, 14 cog        Alertness: alert  and oriented  Appearance:  Casually dressed and Adequately groomed  Behavior/Demeanor: cooperative and calm  Speech: regular rate and rhythm  Mood :  depressed and anhedonia  Affect: subdued; was congruent to mood; was congruent to content  Thought Process (Associations):  Linear and Goal directed  Thought process (Rate):  Normal  Thought content:  no overt psychosis, denies suicidal ideation, intent or thoughts and patient does not appear to be responding to internal stimuli  Perception:  Reports none;  Denies depersonalization and derealization  Attention/Concentration:  Fair  Memory:  Immediate recall intact and Short-term memory intact  Language: intact  Fund of Knowledge/Intelligence:  Average  Abstraction:  Normal  Insight:  Fair  Judgment:  Fair  Cognition: (6) does  appear grossly intact; formal cognitive testing was not done    Physical Exam     Motor activity/EPS:  Normal  Psychomotor: normal or unremarkable    Labs and Results      Pertinent findings on review include: Review of records with relevant information reported in the HPI.  Reviewed pt's past medical record and obtained collateral information.    [place] PRESCRIPTION MONITORING PROGRAM [] was checked today:  indicates Gabapentin 1/17.      PHQ9 Today:  N/A  PHQ 7/8/2020 2/3/2021 2/17/2021   PHQ-9 Total Score 10 5 5   Q9: Thoughts of better off dead/self-harm past 2 weeks Not at all Not at all Not at all       GAD7: " N/A  AKIN-7 SCORE 1/15/2020 7/8/2020 2/3/2021   Total Score - - 7 (mild anxiety)   Total Score 9 13 7   Total Score - - -       Recent Labs   Lab Test 04/12/21  1329   CR 1.10*   GFRESTIMATED 68     Recent Labs   Lab Test 04/12/21  1329 02/02/16  1019   AST 45 19   * 27   ALKPHOS 102 86     3/31/2021: ALT 89, [id number]: 63, 6/8/2020: ALT 82, 6/1/2020: 81, 11/19/2020 ALT 63, 1/13/2019: ALT 22     Vitamin D 24 (4/12/2021)     PSYCHOTROPIC DRUG INTERACTIONS:    Hydroxyzine + Effexor increases risk of QTc prolongation     MANAGEMENT:  Monitoring for adverse effects, pt aware of risks, routine EKG, minimum use of hydroxyzine      Assessment     Omar Kaplan is a 30 year old adult  who presents for med management follow up.  Pt appears somewhat depressed, but not anxious, denies Si, SIB or HI during the appointment.  Pt noted exacerbation of depression and anxiety x 1-1.5 week.  Notes this may be due to start of seasonal depression as he experiences more depression in the summer.  Discussed possibility of increasing Lamictal to help with mood, pt agreed to increase it to 300 mg daily.  Pt has been taking Lamictal 200 mg daily without missing a dose, thus unlikely will have SJS.  Discussed SJS sxs to monitor.  Will continue all other medications for now.  Pt declined refill of Hydroxyzine today.    Also discussed concurrent daily cannabis use. Difficult to comment on current psychiatric symptoms given close proximity to substance use. Diagnostic clarification will require a period of sobriety in addition to longitudinal follow-up and reassessment.  Pt was also explained about unknown interaction of cannabis and psychiatric medications.  Discussed that this writer is not certified cannabis prescriber in [place].  Insomnia is not the condition that is indicated for medical cannabis, but PTSD is.  Encouraged pt to ask PCP is a certified medical cannabis prescriber if they would prescribe cannabis.  Otherwise, [facility] is  a medical cannabis prescriber.    Also reviewed lab, PCP reordered the lab and results were not directed to this writer.  Vitamin D was not started by PCP, encouraged 3434-8107 international unit(s) daily.  Also reviewed abnormal ALT, pt denied frequent Tylenol use or ETOH use.  PCP ordered follow up lab.  This may be due to Effexor as SNRI could elevated LFT.  However, pt has been on Effexor since 2014 and has been on current dose since 2017.  Elevation of ALT appear to start in 2019.  PCP continues to monitor ALT.    Pt is aware that this writer will not be available at [facility] in the future.  Discussed possibility of transfer to other provider at [facility], community provider, PCP or follow this writer to psychiatry. Pt wants to transfer to psychiatry clinic.    Diagnosis                                                                   Bipolar 2 disorder  PTSD  AKIN    Treatment Recommendation and Plan       Medication Ordered/Consults/Labs/tests Ordered:     Medication:   -Increase Lamictal to 300 mg daily for mood.  Monitor for rash that may look like a burn.  If this occurs, please seek immediate medical attention.  -Continue all other medications for now.  OTC Recommendations: Vitamin D 9901-0402 international unit(s) daily.  Lab Orders:  None, follow up with PCP.  Referrals: Ask if primary care is a certified medical cannabis prescriber and if they can prescribe cannabis for PTSD.   [facility] [email or web address] is also a psychiatric provider and medical cannabis prescriber.  Release of Information: none  Future Treatment Considerations:  per symptoms.   Return for Follow Up: in 6 [place].  Pt will be seen at psychiatry clinic.    -Discussed safety plan for suicidal thoughts  -Discussed plan for suicidality  -Discussed available emergency services  -Patient agrees with the treatment plan  -Encouraged to continue outpatient therapy to gain more coping mechanism for  stress.    Treatment Risk Statement: Discussed with the patient my impressions, as well as recommended studies. I educated patient on the differential diagnosis and prognosis. I discussed with the patient the risks and benefits of medications versus no interventions, including efficacy, dose, possible side effects and length of treatment and the importance of medication compliance.  The patient understands the risks, benefits, adverse effects and alternatives. Agrees to treatment with the capacity to do so. No medical contraindications to treatment. The patient also understands the risks of using street drugs or alcohol.     CRISIS NUMBERS:  Pt declined    Diagnosis or treatment significantly limited by social determinants of health.    47 minutes spent on the date of the encounter doing chart review, history and exam, documentation and further activities per the note    Daayn Blackmon, CNP,  4/14/2021

## 2021-04-14 NOTE — PATIENT INSTRUCTIONS
-Increase Lamictal to 300 mg daily for mood.  Monitor for rash that may look like a burn.  If this occurs, please seek immediate medical attention.  -Continue all other medications for now.    -Ask if primary care is a certified medical cannabis prescriber and if they can prescribe cannabis for PTSD.   [employer] [email or web address] is also a psychiatric provider and medical cannabis prescriber.    Your next appointment is scheduled on 5/25/2021 (Tue) at noon.  You are transferred back to see luis at psychiatry clinic.  If you need to reschedule or cancel, please call [phone].

## 2021-05-10 DIAGNOSIS — R79.89 ELEVATED LFTS: ICD-10-CM

## 2021-05-10 DIAGNOSIS — D75.1 POLYCYTHEMIA: ICD-10-CM

## 2021-05-10 LAB — AST SERPL W P-5'-P-CCNC: 36 U/L (ref 0–45)

## 2021-05-10 PROCEDURE — 85018 HEMOGLOBIN: CPT | Performed by: INTERNAL MEDICINE

## 2021-05-10 PROCEDURE — 84450 TRANSFERASE (AST) (SGOT): CPT | Performed by: INTERNAL MEDICINE

## 2021-05-10 PROCEDURE — 36415 COLL VENOUS BLD VENIPUNCTURE: CPT | Performed by: INTERNAL MEDICINE

## 2021-05-10 PROCEDURE — 84460 ALANINE AMINO (ALT) (SGPT): CPT | Performed by: INTERNAL MEDICINE

## 2021-05-24 ENCOUNTER — HOSPITAL ENCOUNTER (OUTPATIENT)
Dept: ULTRASOUND IMAGING | Facility: CLINIC | Age: 31
Discharge: HOME | End: 2021-05-24
Attending: INTERNAL MEDICINE | Admitting: INTERNAL MEDICINE
Payer: COMMERCIAL

## 2021-05-24 DIAGNOSIS — R79.89 ELEVATED LIVER FUNCTION TESTS: ICD-10-CM

## 2021-05-24 LAB
ALT SERPL W P-5'-P-CCNC: 92 U/L (ref 0–70)
HGB BLD-MCNC: 16.6 G/DL (ref 13.3–17.7)

## 2021-05-24 PROCEDURE — 76705 ECHO EXAM OF ABDOMEN: CPT

## 2021-05-25 ENCOUNTER — VIRTUAL VISIT (OUTPATIENT)
Dept: PSYCHIATRY | Facility: CLINIC | Age: 31
End: 2021-05-25
Attending: NURSE PRACTITIONER
Payer: COMMERCIAL

## 2021-05-25 DIAGNOSIS — F41.1 GAD (GENERALIZED ANXIETY DISORDER): ICD-10-CM

## 2021-05-25 DIAGNOSIS — F43.10 PTSD (POST-TRAUMATIC STRESS DISORDER): ICD-10-CM

## 2021-05-25 DIAGNOSIS — F31.70 BIPOLAR AFFECTIVE DISORDER IN REMISSION (H): Primary | ICD-10-CM

## 2021-05-25 DIAGNOSIS — F41.0 PANIC ATTACKS: ICD-10-CM

## 2021-05-25 PROCEDURE — 99214 OFFICE O/P EST MOD 30 MIN: CPT | Mod: 95 | Performed by: NURSE PRACTITIONER

## 2021-05-25 RX ORDER — LAMOTRIGINE 200 MG/1
300 TABLET ORAL DAILY
Qty: 135 TABLET | Refills: 0 | Status: SHIPPED | OUTPATIENT
Start: 2021-05-25 | End: 2021-08-10

## 2021-05-25 RX ORDER — GABAPENTIN 300 MG/1
300 CAPSULE ORAL 3 TIMES DAILY
Qty: 270 CAPSULE | Refills: 0 | Status: SHIPPED | OUTPATIENT
Start: 2021-05-25 | End: 2021-08-10

## 2021-05-25 RX ORDER — VENLAFAXINE HYDROCHLORIDE 150 MG/1
300 CAPSULE, EXTENDED RELEASE ORAL DAILY
Qty: 180 CAPSULE | Refills: 0 | Status: SHIPPED | OUTPATIENT
Start: 2021-05-25 | End: 2021-08-10

## 2021-05-25 NOTE — PATIENT INSTRUCTIONS
-Continue on current medication regimen.    Your next appointment is scheduled on 8/10/2021 (TUe) at 12:30pm.    Thank you for coming to the [facility].    Lab Testing:  If you had lab testing today and your results are reassuring or normal they will be mailed to you or sent through [employer] within 7 days. If the lab tests need quick action we will call you with the results. The phone number we will call with results is # [phone] (home) . If this is not the best number please call our clinic and change the number.    Medication Refills:  If you need any refills please call your pharmacy and they will contact us. Our fax number for refills is [phone]. Please allow three business for refill processing. If you need to  your refill at a new pharmacy, please contact the new pharmacy directly. The new pharmacy will help you get your medications transferred.     Scheduling:  If you have any concerns about today's visit or wish to schedule another appointment please call our office during normal business hours [phone] (8-5:00 M-F)    Contact Us:  Please call [phone] during business hours (8-5:00 M-F).  If after clinic hours, or on the weekend, please call  [phone].    Financial Assistance [phone]  [facility] Billing [phone]  Central Billing Office, [facility]: [phone]  [facility] Billing [phone]  Medical Records [phone]      MENTAL HEALTH CRISIS NUMBERS:  For a medical emergency please call  751 or go to the nearest ER.     [facility]:   [facility] -[phone]   Crisis Residence [facility]idence -[phone]   Walk-In Counseling [facility] -[phone]   COPE 24/7 [place] Mobile Team -[phone] (adults)/[phone] (child)  CHILD: [facility] needs assessment team - [phone]      [facility]:   [facility] - [phone]   Walk-in counseling [facility]  House - [phone]   Walk-in counseling [facility] - [phone]   Crisis Residence [facility] Residence - [phone]  Urgent Care Adult Mental Cnrtxn-[phone] mobile unit/ 24/7 crisis line    National Crisis Numbers:   National Suicide Prevention Lifeline: 4-336-692-TALK (802-873-9244)  Poison Control Center - 1-241.322.4633  [employer]/resources for a list of additional resources (SOS)  Trans Lifeline a hotline for transgender people 7-580-981-0516  The [employer] a hotline for LGBT youth 1-201.946.6266  Crisis Text Line: For any crisis 24/7   To: 293716  see www.crisistextline.org  - IF MAKING A CALL FEELS TOO HARD, send a text!         Again thank you for choosing [facility] and please let us know how we can best partner with you to improve you and your family's health.    You may be receiving a survey regarding this appointment. We would love to have your feedback, both positive and negative. The survey is done by an external company, so your answers are anonymous.

## 2021-05-25 NOTE — CONFIDENTIAL NOTE
Parveen Kaplan is a 31 year old year old adult who is being evaluated via a billable telephone visit for ongoing psychiatric care.     The patient has been notified of the following:    We have found that certain health care needs can be provided without the need for a physical exam. This service lets us provide the care you need with a short phone conversation. If a prescription is necessary we can send it directly to your pharmacy. If lab work is needed we can place an order for that and you can then stop by our lab to have the test done at a later time. Insurers are generally covering virtual visits as they would in-office visits so billing should not be different than normal.  If for some reason you do get billed incorrectly, you should contact the billing office to correct it and that number is in the AVS .      Patient has given verbal consent for Telephone visit?: Yes    Time Service Began: 1200    Time Service Ended: 1218    Phone Call Duration:  18 minutes    Psychiatry Clinic Progress Note                                                                  Patient Name: Parveen Kaplan  YOB: 1990  MRN: [id number]  Date of Service:  5/25/2021  Last Seen:4/14/2021    Parveen Kaplan is a 30 year old person assigned female at birth, identifies as male who uses the name Omar and pronoun kareen.       Omar Kaplan is a 30 year old year old adult who is being evaluated via a billable telepsychiatry visit for ongoing psychiatric care.      Omar Kaplan was last seen on 4/14/2021.      At that time,     Medication Ordered/Consults/Labs/tests Ordered:      Medication:   -Increase Lamictal to 300 mg daily for mood.  Monitor for rash that may look like a burn.  If this occurs, please seek immediate medical attention.  -Continue all other medications for now.  OTC Recommendations: Vitamin D 2859-0054 international unit(s) daily.  Lab Orders:  None, follow up with PCP.  Referrals: Ask if primary care is a  "certified medical cannabis prescriber and if they can prescribe cannabis for PTSD.   [employer] [email or web address] is also a psychiatric provider and medical cannabis prescriber.  Release of Information: none  Future Treatment Considerations:  per symptoms.   Return for Follow Up: in 6 weeks [place].  Pt will be seen at psychiatry clinic.      Pertinent Background:  Diagnoses include bipolar disorder, PTSD and AKIN.  Psych critical item history includes suicidal ideation, SIB [burning during HS], trauma hx and psych hosp (<3).      Previous medication trial: Buspar, Ativan, Clonidine (tremor), Seroquel (oversedation at 25mg, nausea, dizziness), Gabapentin (higher than 300 mg TID oversedation), Paxil (increased SI), Prazosin (oversedation), Remeron (not effective)     [All pronouns should read as \"he\"]     Therapist: Omer Cotton  Hormone Care: BALAJI Reed    Interim History                                                                                                        4, 4     On 4/14/2021, contacted Vitamin D supplement as it was lower than optimal level.  Pt has not started and recommended 2000 international unit(s) daily.  Also discussed renal or hepatic effects of Vitamin D.    Since the last visit,  -Feeling well since Lamictal increase.  Mood and anxiety are optimally managed, denies SI, SIB or HI.  -Used Hydroxyzine x 1 since last seen since anxiety is well managed.  -Have not found medical cannabis provider.  -Had imaging for abnormal LFT.  Wondering if medication is causing this.      Denies any symptoms suggestive of hypomania or psychosis.    Current Suicidality/Hx of Suicide Attempts: Denies both  CoCominent Medical concerns:Denies    Medication Side Effects: The patient denies all medication side effects.      Medical Review of Systems     Apart from the symptoms mentioned int he HPI, the 14 point review of systems, including constitutional, HEENT, cardiovascular, " "respiratory, gastrointestinal, genitourinary, musculoskeletal, integumentary, endocrine, neurological, hematologic and allergic is entirely negative.    Pregnant: None. Nursing: None, Contraception: partner not producing sperm    Substance Use   Denies frequent or abuse of alcohol. Social drinking rarely, when he drinks less than 2-3 drinks. Denies any other substance use.     Social/ Family History                                  [per patient report]                                 1ea,1ea     Living arrangements: living with partner in his parent's home and feels safe  Social Support: fiancee, family  Access to gun: denies    Allergy                                Imitrex [sumatriptan]    Current Medications                                                                                                       Current Outpatient Medications   Medication Sig Dispense Refill     albuterol (PROAIR HFA/PROVENTIL HFA/VENTOLIN HFA) 108 (90 Base) MCG/ACT inhaler Inhale 2 puffs into the lungs every 6 hours 1 Inhaler 0     gabapentin (NEURONTIN) 300 MG capsule Take 1 capsule (300 mg) by mouth 3 times daily 270 capsule 0     hydrOXYzine (ATARAX) 25 MG tablet Take 1 tablet (25 mg) by mouth every 8 hours as needed for anxiety 30 tablet 1     lamoTRIgine (LAMICTAL) 200 MG tablet Take 1.5 tablets (300 mg) by mouth daily 135 tablet 0     naloxone (NARCAN) 4 MG/0.1ML nasal spray Spray 1 spray (4 mg) into one nostril alternating nostrils as needed for opioid reversal every 2-3 minutes until assistance arrives 0.2 mL 11     ondansetron (ZOFRAN) 4 MG tablet Take 1 tablet (4 mg) by mouth every 8 hours as needed for nausea 12 tablet 0     syringe/needle, disp, 25G X 1\" 1 ML MISC To use with weekly IM injection 10 each 3     testosterone (TESTOPEL) 75 MG subcutaneous implant pellet 825 mg by Subdermal route every 30 days       venlafaxine (EFFEXOR-XR) 150 MG 24 hr capsule Take 2 capsules (300 mg) by mouth daily 180 capsule 0        " "Mental Status Exam                                                                                   9, 14 cog        Alertness: alert  and oriented  Behavior/Demeanor: cooperative, pleasant and calm  Speech: regular rate and rhythm  Mood :  \"good\"  Affect: full range and appropriate; was congruent to mood; was congruent to content  Thought Process (Associations):  Logical, Linear and Goal directed  Thought process (Rate):  Normal  Thought content:  no overt psychosis, denies suicidal ideation, intent or thoughts and patient does not appear to be responding to internal stimuli  Perception:  Reports none;  Denies depersonalization and derealization  Attention/Concentration:  Fair  Memory:  Immediate recall intact and Short-term memory intact  Language: intact  Fund of Knowledge/Intelligence:  Average  Abstraction:  Normal  Insight:  Good  Judgment:  Good   Cognition: (6) does  appear grossly intact; formal cognitive testing was not done    Labs and Results      Pertinent findings on review include: Review of records with relevant information reported in the HPI.  Reviewed pt's past medical record and obtained collateral information.      [place] PRES[place] MONITORING PROGRAM [] was checked today:  indicates Gabapentin 1/17.    PHQ9 Today:  N/A  PHQ 7/8/2020 2/3/2021 2/17/2021   PHQ-9 Total Score 10 5 5   Q9: Thoughts of better off dead/self-harm past 2 weeks Not at all Not at all Not at all       AKIN 7 Today: N/A  AKIN-7 SCORE 1/15/2020 7/8/2020 2/3/2021   Total Score - - 7 (mild anxiety)   Total Score 9 13 7   Total Score - - -       Recent Labs   Lab Test 04/12/21  1329   CR 1.10*   GFRESTIMATED 68     Recent Labs   Lab Test 05/10/21  1307 04/12/21  1329 02/02/16  1019   AST 36 45 19   ALT 92* 107* 27   ALKPHOS  --  102 86     3/31/2021: ALT 89, 10900001: 63, 6/8/2020: ALT 82, 6/1/2020: 81, 11/19/2020 ALT 63, 1/13/2019: ALT 22     Vitamin D 24 (4/12/2021)     PSYCHOTROPIC DRUG INTERACTIONS:    Hydroxyzine + Effexor " increases risk of QTc prolongation     MANAGEMENT:  Monitoring for adverse effects, pt aware of risks, routine EKG, minimum use of hydroxyzine    Impression/Assessment      Omar Kaplan is a 31 year old adult  who presents for med management follow up.  Pt sounds stable in his mood and anxiety, denies SI, SIB or HI during the appointment.  Pt noted significant improvement of mood and anxiety since Lamictal and tolerating medication without any ADRs.  Pt had abnormal ALT and had imaging done yesterday which result is not available at this time.  Discussed pt has been on Effexor prior to 2016, but it appears the dose was changed from 225 mg daily to 300 mg daily in 2017.  Increase in Effexor dose may possibly be affecting ALT abnormality.  Discussed await for imaging result and if abnormality is present, may consider decreasing Effexor to 225 mg daily and repeat ALT in 3 months after the dose change to see if ALT would improve.  Pt decided to continue on current medication regimen for now as he is feeling well.  OK to continue on current medication regimen for now.    Also discussed concurrent daily cannabis use. Difficult to comment on current psychiatric symptoms given close proximity to substance use. Diagnostic clarification will require a period of sobriety in addition to longitudinal follow-up and reassessment.  Pt was also explained about unknown interaction of cannabis and psychiatric medications. Pt noted his PCP is not certified medical cannabis provider and still unable to find medical cannabis provider.  Gave other referral information today.    Diagnosis                                                                   Bipolar 2 disorder  PTSD  AKIN    Treatment Recommendation & Plan       Medication Ordered/Consults/Labs/tests Ordered:     Medication: Continue on current medication regimen.  OTC Recommendations: none  Lab Orders:  none  Referrals:   Certified Medical Cannabis providers  -[facility] [facility]  [email or web address] To Get Approved For/ is also a psychiatric provider and medical cannabis prescriber.  -[employer]: [email or web address]  -[employer] [email or web address]/?utm_source=[email or web address]&utm_medium=adiel&[facility]_campaign=[place]    Release of Information: none  Future Treatment Considerations: Per symptoms.   Return for Follow Up: in 3 months per pt's request    -Discussed safety plan for suicidal thoughts  -Discussed plan for suicidality  -Discussed available emergency services  -Patient agrees with the treatment plan  -Encouraged to continue outpatient therapy to gain more coping mechanism for stress.    Treatment Risk Statement: Discussed with the patient my impressions, as well as recommended studies. I educated patient on the differential diagnosis and prognosis. I discussed with the patient the risks and benefits of medications versus no interventions, including efficacy, dose, possible side effects and length of treatment and the importance of medication compliance.  The patient understands the risks, benefits, adverse effects and alternatives. Agrees to treatment with the capacity to do so. No medical contraindications to treatment. The patient also understands the risks of using street drugs or alcohol.     CRISIS NUMBERS:   Provided routinely in AVS.    Diagnosis or treatment significantly limited by social determinants of health.    34 minutes spent on the date of the encounter doing chart review, history and exam, documentation and further activities per the note      Dayan Blackmon CNP,  5/25/2021

## 2021-06-02 ENCOUNTER — MYC MEDICAL ADVICE (OUTPATIENT)
Dept: FAMILY MEDICINE | Facility: CLINIC | Age: 31
End: 2021-06-02

## 2021-06-03 NOTE — TELEPHONE ENCOUNTER
Please do result note for Ultrasound, so we can respond to patient's mychart message.   Hermelinda Pearl MA

## 2021-06-04 ENCOUNTER — MYC MEDICAL ADVICE (OUTPATIENT)
Dept: FAMILY MEDICINE | Facility: CLINIC | Age: 31
End: 2021-06-04

## 2021-06-05 DIAGNOSIS — K76.0 FATTY LIVER: ICD-10-CM

## 2021-06-05 DIAGNOSIS — R79.89 ELEVATED LFTS: Primary | ICD-10-CM

## 2021-06-08 NOTE — TELEPHONE ENCOUNTER
Ayaz Paul- Having trouble with my computer at home. Can,t get into the referrals. Just fax to [phone] Not sure which location see Liver patient's.     Ivis Schofield  Referral Coordinator

## 2021-06-08 NOTE — TELEPHONE ENCOUNTER
Faxed per below, sent [facility] to pt to notify him.  Beverly Sheehan, RN  [facility] RN Triage Team

## 2021-06-08 NOTE — TELEPHONE ENCOUNTER
Ivis,    Are you able to review and let us (or pt) know where this referral will be sent? Is this internal or [facility], etc?    Want to give Miles correct information.    Beverly Sheehan, RN  [facility] RN Triage Team

## 2021-08-10 ENCOUNTER — VIRTUAL VISIT (OUTPATIENT)
Dept: PSYCHIATRY | Facility: CLINIC | Age: 31
End: 2021-08-10
Attending: NURSE PRACTITIONER
Payer: COMMERCIAL

## 2021-08-10 DIAGNOSIS — F41.0 PANIC ATTACKS: ICD-10-CM

## 2021-08-10 DIAGNOSIS — F41.1 GAD (GENERALIZED ANXIETY DISORDER): ICD-10-CM

## 2021-08-10 DIAGNOSIS — F31.70 BIPOLAR AFFECTIVE DISORDER IN REMISSION (H): Primary | ICD-10-CM

## 2021-08-10 DIAGNOSIS — F43.10 PTSD (POST-TRAUMATIC STRESS DISORDER): ICD-10-CM

## 2021-08-10 PROCEDURE — 99214 OFFICE O/P EST MOD 30 MIN: CPT | Mod: 95 | Performed by: NURSE PRACTITIONER

## 2021-08-10 RX ORDER — LAMOTRIGINE 200 MG/1
300 TABLET ORAL DAILY
Qty: 135 TABLET | Refills: 1 | Status: SHIPPED | OUTPATIENT
Start: 2021-08-10 | End: 2022-05-09

## 2021-08-10 RX ORDER — VENLAFAXINE HYDROCHLORIDE 150 MG/1
300 CAPSULE, EXTENDED RELEASE ORAL DAILY
Qty: 180 CAPSULE | Refills: 1 | Status: SHIPPED | OUTPATIENT
Start: 2021-08-10 | End: 2022-06-07

## 2021-08-10 RX ORDER — GABAPENTIN 300 MG/1
300 CAPSULE ORAL 3 TIMES DAILY
Qty: 270 CAPSULE | Refills: 1 | Status: SHIPPED | OUTPATIENT
Start: 2021-08-23 | End: 2021-11-04

## 2021-08-10 NOTE — CONFIDENTIAL NOTE
Parveen Kaplan is a 31 year old year old adult who is being evaluated via a billable telephone visit for ongoing psychiatric care.     The patient has been notified of the following:    We have found that certain health care needs can be provided without the need for a physical exam. This service lets us provide the care you need with a short phone conversation. If a prescription is necessary we can send it directly to your pharmacy. If lab work is needed we can place an order for that and you can then stop by our lab to have the test done at a later time. Insurers are generally covering virtual visits as they would in-office visits so billing should not be different than normal.  If for some reason you do get billed incorrectly, you should contact the billing office to correct it and that number is in the AVS .      Patient has given verbal consent for Telephone visit?: Yes    Time Service Began: 1230    Time Service Ended: 1242    Phone Call Duration:  12 minutes    Psychiatry Clinic Progress Note                                                                  Patient Name: Parveen Kaplan  YOB: 1990  MRN: [id number]  Date of Service:  8/10/2021  Last Seen:5/25/2021    Parveen Kaplan is a 31 year old person assigned female at birth, identifies as male who uses the name Omar and pronoun kareen.       Omar Kaplan is a 31 year old year old adult who is being evaluated via a billable telepsychiatry visit for ongoing psychiatric care.      Omar Kaplan was last seen on 5/25/2021.    At that time,     Medication Ordered/Consults/Labs/tests Ordered:      Medication: Continue on current medication regimen.  OTC Recommendations: none  Lab Orders:  none  Referrals:   Certified Medical Cannabis providers  -[facility] [email or web address] is also a psychiatric provider and medical cannabis prescriber.  -[employer]: [email or web address]  -[employer]  "[email or web address]://[employer]/[facility]/[place]/?utm_source=[employer]&ut_medium=[facility]_campaign=[place]     Release of Information: none  Future Treatment Considerations: Per symptoms.   Return for Follow Up: in 3 months per pt's request    Pertinent Background:  Diagnoses include bipolar disorder, PTSD and AKIN.  Psych critical item history includes suicidal ideation, SIB [burning during HS], trauma hx and psych hosp (<3).      Previous medication trial: Buspar, Ativan, Clonidine (tremor), Seroquel (oversedation at 25mg, nausea, dizziness), Gabapentin (higher than 300 mg TID oversedation), Paxil (increased SI), Prazosin (oversedation), Remeron (not effective)     [All pronouns should read as \"he\"]     Therapist: Omer Cotton  Hormone Care: Mary Bhakta, BALAJI    Interim History                                                                                                        4, 4     Since the last visit,  -Relatively doing well, mood and anxiety are well managed, denies SI, SIB Or HI.  -However, last few days have been difficult as his cat .  Though somewhat expected, still having difficult time, but thinks time will help.  -Sleep schedule has been fluctuating [place] but sleeping 6-8 hours/night.  -Had hepatologist visit and was diagnosed with nonalcohol induced fatty liver and was recommended to lose weight and change life style.  Has a follow up in 2021.  Does not want any medication changes until hepatology visit.  -Using Hydroxyzine very sparingly only.    Denies any symptoms suggestive of hypomania or psychosis.    Current Suicidality/Hx of Suicide Attempts: Denies both  CoCominent Medical concerns:Denies    Medication Side Effects: The patient denies all medication side effects.      Medical Review of Systems     Apart from the symptoms mentioned int he HPI, the 14 point review of systems, including constitutional, HEENT, cardiovascular, respiratory, " "gastrointestinal, genitourinary, musculoskeletal, integumentary, endocrine, neurological, hematologic and allergic is entirely negative.    Pregnant: None. Nursing: None, Contraception: partner not producing sperm    Substance Use   Denies frequent or abuse of alcohol. Social drinking rarely, when he drinks less than 2-3 drinks. Denies any other substance use.     Social/ Family History                                  [per patient report]                                 1ea,1ea   Living arrangements: living with partner in his parent's home and feels safe  Social Support: fiancee, family  Access to gun: denies    Allergy                                Imitrex [sumatriptan]    Current Medications                                                                                                       Current Outpatient Medications   Medication Sig Dispense Refill     albuterol (PROAIR HFA/PROVENTIL HFA/VENTOLIN HFA) 108 (90 Base) MCG/ACT inhaler Inhale 2 puffs into the lungs every 6 hours 1 Inhaler 0     gabapentin (NEURONTIN) 300 MG capsule Take 1 capsule (300 mg) by mouth 3 times daily 270 capsule 0     hydrOXYzine (ATARAX) 25 MG tablet Take 1 tablet (25 mg) by mouth every 8 hours as needed for anxiety 30 tablet 1     lamoTRIgine (LAMICTAL) 200 MG tablet Take 1.5 tablets (300 mg) by mouth daily 135 tablet 0     naloxone (NARCAN) 4 MG/0.1ML nasal spray Spray 1 spray (4 mg) into one nostril alternating nostrils as needed for opioid reversal every 2-3 minutes until assistance arrives 0.2 mL 11     ondansetron (ZOFRAN) 4 MG tablet Take 1 tablet (4 mg) by mouth every 8 hours as needed for nausea 12 tablet 0     syringe/needle, disp, 25G X 1\" 1 ML MISC To use with weekly IM injection 10 each 3     testosterone (TESTOPEL) 75 MG subcutaneous implant pellet 825 mg by Subdermal route every 30 days       venlafaxine (EFFEXOR-XR) 150 MG 24 hr capsule Take 2 capsules (300 mg) by mouth daily 180 capsule 0        Mental Status Exam  " "                                                                                 9, 14 cog        Alertness: alert  and oriented  Behavior/Demeanor: cooperative, pleasant and calm  Speech: regular rate and rhythm  Mood :  \"okay\"  Affect: almost full range; was congruent to mood; was congruent to content  Thought Process (Associations):  Logical, Linear and Goal directed  Thought process (Rate):  Normal  Thought content:  no overt psychosis, denies suicidal ideation, intent or thoughts and patient does not appear to be responding to internal stimuli  Perception:  Reports none;  Denies depersonalization and derealization  Attention/Concentration:  Fair  Memory:  Immediate recall intact and Short-term memory intact  Language: intact  Fund of Knowledge/Intelligence:  Average  Abstraction:  Normal  Insight:  Good  Judgment:  Good  Cognition: (6) does  appear grossly intact; formal cognitive testing was not done    Labs and Results      Pertinent findings on review include: Review of records with relevant information reported in the HPI.  Reviewed pt's past medical record and obtained collateral information.      [place]IT[place] [] was checked today:  indicates Gabapentin 5/25.    PHQ9 Today:  N/A  PHQ 7/8/2020 2/3/2021 2/17/2021   PHQ-9 Total Score 10 5 5   Q9: Thoughts of better off dead/self-harm past 2 weeks Not at all Not at all Not at all       AKIN 7 Today: N/A  AKIN-7 SCORE 1/15/2020 7/8/2020 2/3/2021   Total Score - - 7 (mild anxiety)   Total Score 9 13 7   Total Score - - -       Recent Labs   Lab Test 04/12/21  1329   CR 1.10*   GFRESTIMATED 68     Recent Labs   Lab Test 05/10/21  1307 04/12/21  1329 02/02/16  1019   AST 36 45 19   ALT 92* 107* 27   ALKPHOS  --  102 86     3/31/2021: ALT 89, [id number]: 63, 6/8/2020: ALT 82, 6/1/2020: 81, 11/19/2020 ALT 63, 1/13/2019: ALT 22     Vitamin D 24 (4/12/2021)     PSYCHOTROPIC DRUG INTERACTIONS:    Hydroxyzine + Effexor increases risk of QTc " prolongation     MANAGEMENT:  Monitoring for adverse effects, pt aware of risks, routine EKG, minimum use of hydroxyzine    Impression/Assessment      Omar Kaplan is a 31 year old adult  who presents for med management follow up.  Pt sounds stable in his mood and anxiety, denies SI, SIB or HI during the appointment.  Pt lost his cat, but notes this is grief process and need time.  Pt wants to continue on current medication regimen until hepatology follow up in Nov 2021.  If diet change and lifestyle change do not improve LFT, may consider cross tapering Effexor to selective serotonin reuptake inhibitor or other medications in the future.  But will continue on current medication regimen for now.  Pt declined Hydroxyzine refill as he infrequently uses it.    Diagnosis                                                                    Bipolar 2 disorder  PTSD  AKIN    Treatment Recommendation & Plan       Medication Ordered/Consults/Labs/tests Ordered:     Medication: Continue on current medication regimen.  OTC Recommendations: none  Lab Orders:  none  Referrals: none  Release of Information: none  Future Treatment Considerations: Per symptoms.   Return for Follow Up: in 6 months per pt's request    -Discussed safety plan for suicidal thoughts  -Discussed plan for suicidality  -Discussed available emergency services  -Patient agrees with the treatment plan  -Encouraged to continue outpatient therapy to gain more coping mechanism for stress.    Treatment Risk Statement: Discussed with the patient my impressions, as well as recommended studies. I educated patient on the differential diagnosis and prognosis. I discussed with the patient the risks and benefits of medications versus no interventions, including efficacy, dose, possible side effects and length of treatment and the importance of medication compliance.  The patient understands the risks, benefits, adverse effects and alternatives. Agrees to treatment with the  capacity to do so. No medical contraindications to treatment. The patient also understands the risks of using street drugs or alcohol.     CRISIS NUMBERS:   Provided routinely in AVS.    Diagnosis or treatment significantly limited by social determinants of health.    Dayan Blackmon CNP,  8/10/2021

## 2021-08-10 NOTE — PATIENT INSTRUCTIONS
-Continue on current medication regimen.    -Follow up in 6 months    Thank you for coming to the [facility] HEALTH & ADDI[facility] [place] [facility].    Lab Testing:  If you had lab testing today and your results are reassuring or normal they will be mailed to you or sent through [employer] within 7 days. If the lab tests need quick action we will call you with the results. The phone number we will call with results is # [phone] (home) . If this is not the best number please call our clinic and change the number.    Medication Refills:  If you need any refills please call your pharmacy and they will contact us. Our fax number for refills is [phone]. Please allow three business for refill processing. If you need to  your refill at a new pharmacy, please contact the new pharmacy directly. The new pharmacy will help you get your medications transferred.     Scheduling:  If you have any concerns about today's visit or wish to schedule another appointment please call our office during normal business hours [phone] (8-5:00 M-F)    Contact Us:  Please call [phone] during business hours (8-5:00 M-F).  If after clinic hours, or on the weekend, please call  [phone].    Financial Assistance [phone]  [facility] Billing [phone]  [place] Billing [facility], [facility]: [phone]  [place] Billing [phone]  Medical Records [phone]      MENTAL HEALTH CRISIS NUMBERS:  For a medical emergency please call  911 or go to the nearest ER.     [facility]:   [facility] -[phone]   Crisis Residence [facility] Residence -[phone]   Walk-In Counseling Center [facility] -[phone]   COPE 24/7 [place] Mobile Team -[phone] (adults)/[phone] (child)  CHILD: [facility] needs assessment team - [phone]      [facility]:   [facility] - [phone]   Walk-in counseling [facility] - [phone]   Walk-in counseling   [facility] - [phone]   Crisis Residence  [facility] - [phone]  Urgent Care Adult Mental [facility]-[phone] mobile unit/ 24/7 crisis line    National Crisis Numbers:   National Suicide Prevention Lifeline: 6-317-413-TALK (452-454-6604)  Poison Control Center - 1-527-513-5707  [employer]/resources for a list of additional resources (SOS)  Trans Lifeline a hotline for transgender people 4-268-786-8106  [employer] a hotline for LGBT youth 5-536-393-5063  Crisis Text Line: For any crisis 24/7   To: 768009  see www.crisistextline.org  - IF MAKING A CALL FEELS TOO HARD, send a text!         Again thank you for choosing [facility] MENTAL HEALTH & ADDICTION [place] [facility]IN[facility] and please let us know how we can best partner with you to improve you and your family's health.    You may be receiving a survey regarding this appointment. We would love to have your feedback, both positive and negative. The survey is done by an external company, so your answers are anonymous.

## 2021-10-14 ENCOUNTER — MYC MEDICAL ADVICE (OUTPATIENT)
Dept: FAMILY MEDICINE | Facility: CLINIC | Age: 31
End: 2021-10-14

## 2021-10-19 PROBLEM — F32.9 MAJOR DEPRESSION: Status: ACTIVE | Noted: 2021-03-08

## 2021-10-23 ENCOUNTER — HEALTH MAINTENANCE LETTER (OUTPATIENT)
Age: 31
End: 2021-10-23

## 2021-11-04 ENCOUNTER — VIRTUAL VISIT (OUTPATIENT)
Dept: PSYCHIATRY | Facility: CLINIC | Age: 31
End: 2021-11-04
Attending: NURSE PRACTITIONER
Payer: COMMERCIAL

## 2021-11-04 DIAGNOSIS — F43.10 PTSD (POST-TRAUMATIC STRESS DISORDER): ICD-10-CM

## 2021-11-04 DIAGNOSIS — F31.70 BIPOLAR AFFECTIVE DISORDER IN REMISSION (H): Primary | ICD-10-CM

## 2021-11-04 DIAGNOSIS — F41.1 GAD (GENERALIZED ANXIETY DISORDER): ICD-10-CM

## 2021-11-04 DIAGNOSIS — F41.0 PANIC ATTACKS: ICD-10-CM

## 2021-11-04 PROCEDURE — 99214 OFFICE O/P EST MOD 30 MIN: CPT | Mod: 95 | Performed by: NURSE PRACTITIONER

## 2021-11-04 RX ORDER — GABAPENTIN 300 MG/1
300 CAPSULE ORAL DAILY
Qty: 90 CAPSULE | Refills: 1 | Status: SHIPPED | DISCHARGE
Start: 2021-11-04 | End: 2022-02-08
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
## 2021-11-04 NOTE — PATIENT INSTRUCTIONS
-Continue on current medication regimen.    -Pls have ADHD assessment fax to our clinic prior to next appt.    Your next appointment is scheduled on 12/2/2021 (Thu) at 1:30pm.  If you have not seen hepatology then, please reschedule the appt.    Thank you for coming to the [facility] MENTAL HEALTH & ADDICTION [place] CLIN[facility].    Lab Testing:  If you had lab testing today and your results are reassuring or normal they will be mailed to you or sent through [employer] within 7 days. If the lab tests need quick action we will call you with the results. The phone number we will call with results is # [phone] (home) . If this is not the best number please call our clinic and change the number.    Medication Refills:  If you need any refills please call your pharmacy and they will contact us. Our fax number for refills is [phone]. Please allow three business for refill processing. If you need to  your refill at a new pharmacy, please contact the new pharmacy directly. The new pharmacy will help you get your medications transferred.     Scheduling:  If you have any concerns about today's visit or wish to schedule another appointment please call our office during normal business hours [phone] (8-5:00 M-F)    Contact Us:  Please call [phone] during business hours (8-5:00 M-F).  If after clinic hours, or on the weekend, please call  [phone].    Financial Assistance [phone]  [facility] Billing [phone]  [facility] Billing [facility], [facility]: [phone]  [place] Billing [phone]  Medical Records [phone]      MENTAL HEALTH CRISIS NUMBERS:  For a medical emergency please call  911 or go to the nearest ER.     [facility]:   [facility] -[phone]   Crisis Residence [facility] Residence -[phone]   Walk-In Counseling Center [facility] -[phone]   COPE 24/7 [place] Mobile Team -[phone] (adults)/[phone] (child)  CHILD: [facility] needs  assessment team - [phone]      [facility]:   [facility] - [phone]   Walk-in counseling [facility] - [phone]   Walk-in counseling [facility] - [phone]   Crisis Residence [facility] Residence - [phone]  Urgent Care Adult Mental Pwkglq-[phone] mobile unit/ 24/7 crisis line    National Crisis Numbers:   National Suicide Prevention Lifeline: 7-317-988-TALK (668-342-5873)  Poison Control Center - 7-089-004-0057  [employer]/resources for a list of additional resources (SOS)  Trans Lifeline a hotline for transgender people 4-242-412-3834  [employer] a hotline for LGBT youth 1-107-236-6920  Crisis Text Line: For any crisis 24/7   To: 396140  see www.crisistextline.org  - IF MAKING A CALL FEELS TOO HARD, send a text!         Again thank you for choosing [facility] MENTAL HEALTH & ADDICTION [place] and please let us know how we can best partner with you to improve you and your family's health.    You may be receiving a survey regarding this appointment. We would love to have your feedback, both positive and negative. The survey is done by an external company, so your answers are anonymous.

## 2021-11-23 NOTE — TELEPHONE ENCOUNTER
Received two signed copies from the provider with request to mail both copies directly to the pt. Writer did so. Also made copies of the letters and placed one in scanning and second held at writer's desk.   No

## 2021-12-02 ENCOUNTER — VIRTUAL VISIT (OUTPATIENT)
Dept: PSYCHIATRY | Facility: CLINIC | Age: 31
End: 2021-12-02
Attending: NURSE PRACTITIONER
Payer: COMMERCIAL

## 2021-12-02 DIAGNOSIS — F43.10 PTSD (POST-TRAUMATIC STRESS DISORDER): ICD-10-CM

## 2021-12-02 DIAGNOSIS — F31.31 BIPOLAR AFFECTIVE DISORDER, CURRENTLY DEPRESSED, MILD (H): Primary | ICD-10-CM

## 2021-12-02 DIAGNOSIS — F41.1 GAD (GENERALIZED ANXIETY DISORDER): ICD-10-CM

## 2021-12-02 PROCEDURE — 99214 OFFICE O/P EST MOD 30 MIN: CPT | Mod: 95 | Performed by: NURSE PRACTITIONER

## 2021-12-02 NOTE — PATIENT INSTRUCTIONS
-Continue on current medication regimen.    -Recommend SAD light 10.000 LUX for seasonal depression.  Use it first thing in the morning for 5-15 min while monitoring for irritability.  If irritability occurs, to reduce the duration.  Place the light box on a desk or table, and sit in front of it at the specified distance. You can do this while you read, eat breakfast, or work at a computer. The light should reach your eyes, but don't stare at the light box.   If you notice difficulties with sleep or any other hypomanic symptoms, reduce the duration of time.    Your next appointment is scheduled on 1/6/2022 (Thu) at 11am.    Thank you for coming to the [facility] MENTAL HEALTH & ADDICTION [place] CLIN[facility].    Lab Testing:  If you had lab testing today and your results are reassuring or normal they will be mailed to you or sent through [employer] within 7 days. If the lab tests need quick action we will call you with the results. The phone number we will call with results is # [phone] (home) . If this is not the best number please call our clinic and change the number.    Medication Refills:  If you need any refills please call your pharmacy and they will contact us. Our fax number for refills is [phone]. Please allow three business for refill processing. If you need to  your refill at a new pharmacy, please contact the new pharmacy directly. The new pharmacy will help you get your medications transferred.     Scheduling:  If you have any concerns about today's visit or wish to schedule another appointment please call our office during normal business hours [phone] (8-5:00 M-F)    Contact Us:  Please call [phone] during business hours (8-5:00 M-F).  If after clinic hours, or on the weekend, please call  [phone].    Financial Assistance [phone]  [facility] Billing [phone]  Central Billing Office, [facility]: [phone]  [place] Billing [phone]  Medical Records  [phone]      MENTAL HEALTH CRISIS NUMBERS:  For a medical emergency please call  911 or go to the nearest ER.     [facility]:   [facility] -[phone]   Crisis Residence [facility] -[phone]   Walk-In Counseling Center [facility] -[phone]   COPE 24/7 [place] Mobile Team -[phone] (adults)/[phone] (child)  CHILD: [facility] needs assessment team - [phone]      [facility]:   [facility] - [phone]   Walk-in counseling [facility] - [phone]   Walk-in counseling [facility] - [phone]   Crisis Residence [facility] Residence - [phone]  Urgent Care Adult Mental Anjbjf-[phone] mobile unit/ 24/7 crisis line    National Crisis Numbers:   National Suicide Prevention Lifeline: 5-155-731-TALK (108-362-2599)  Poison Control Center - 7-423-472-7889  alive.cn/resources for a list of additional resources (SOS)  Trans Lifeline a hotline for transgender people 1-842.198.3168  [employer] a hotline for LGBT youth 1-839.365.2047  Crisis Text Line: For any crisis 24/7   To: 644791  see www.crisistextline.org  - IF MAKING A CALL FEELS TOO HARD, send a text!         Again thank you for choosing [facility] MENTAL HEALTH & ADDICTION [facility] and please let us know how we can best partner with you to improve you and your family's health.    You may be receiving a survey regarding this appointment. We would love to have your feedback, both positive and negative. The survey is done by an external company, so your answers are anonymous.
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
## 2021-12-16 NOTE — TELEPHONE ENCOUNTER
On March 17, 2021 at 12:34 PM writer called patient at [phone] to confirm Virtual Visit. Writer unable to make contact with patient. No voice mail left due to call back. Writer  Rhea Henderson CMA  
On March 17, 2021, at 11:05 AM, writer called patient at [phone] to confirm Virtual Visit. Writer unable to make contact with patient. Writer left detailed voice message for call back. [phone] left as call back number. Rhea Henderson, [facility]    
[FreeTextEntry1] : General: No acute distress. Well nourished and well kempt.\par Head: AT/NC\par Eyes: PERRL. EOMI.\par Pulmonary: No respiratory distress. \par ABD: Soft. NT/ND. No rebound, no guarding, no rigidity. No peritoneal signs. No masses.  Incision healed.\par Extremities: Warm. No edema.\par Neurological: A&O x 4.\par Psychiatric: Normal affect and mood.\par

## 2022-02-08 ENCOUNTER — MYC MEDICAL ADVICE (OUTPATIENT)
Dept: PSYCHIATRY | Facility: CLINIC | Age: 32
End: 2022-02-08
Payer: COMMERCIAL

## 2022-02-08 DIAGNOSIS — F41.0 PANIC ATTACKS: ICD-10-CM

## 2022-02-08 RX ORDER — GABAPENTIN 300 MG/1
300 CAPSULE ORAL DAILY
Qty: 90 CAPSULE | Refills: 0 | Status: SHIPPED | OUTPATIENT
Start: 2022-02-08 | End: 2022-02-14

## 2022-02-08 NOTE — TELEPHONE ENCOUNTER
Last seen: 12/2/21  RTC: 1 month  Cancel: x1  No-show: none  Next appt: none     Medication requested: gabapentin (NEURONTIN) 300 MG capsule  Directions: Take 1 capsule (300 mg) by mouth daily - Oral  Qty: 270  Last refilled: 5/25/21     Medication refill pended and routed to provider.

## 2022-02-12 ENCOUNTER — HEALTH MAINTENANCE LETTER (OUTPATIENT)
Age: 32
End: 2022-02-12

## 2022-02-14 RX ORDER — GABAPENTIN 300 MG/1
300 CAPSULE ORAL 3 TIMES DAILY PRN
Qty: 60 CAPSULE | Refills: 0 | Status: SHIPPED | OUTPATIENT
Start: 2022-02-14 | End: 2022-03-28

## 2022-03-04 ENCOUNTER — TRANSFERRED RECORDS (OUTPATIENT)
Dept: HEALTH INFORMATION MANAGEMENT | Facility: CLINIC | Age: 32
End: 2022-03-04
Payer: COMMERCIAL

## 2022-03-04 LAB
ALT SERPL-CCNC: 46 IU/L (ref 0–44)
AST SERPL-CCNC: 24 IU/L (ref 0–40)
CREATININE (EXTERNAL): 0.94 MG/DL (ref 0.76–1.24)
GFR ESTIMATED (EXTERNAL): 113 ML/MIN/1.73
GLUCOSE (EXTERNAL): 88 MG/DL (ref 65–99)
INR (EXTERNAL): 1 (ref 0.9–1.2)
POTASSIUM (EXTERNAL): 4.3 MMOL/L (ref 3.5–5.2)

## 2022-03-28 DIAGNOSIS — F41.0 PANIC ATTACKS: ICD-10-CM

## 2022-03-28 RX ORDER — GABAPENTIN 300 MG/1
CAPSULE ORAL
Qty: 90 CAPSULE | Refills: 0 | Status: SHIPPED | OUTPATIENT
Start: 2022-03-28 | End: 2022-04-28

## 2022-03-28 NOTE — TELEPHONE ENCOUNTER
Last seen: 12/02/21  RTC: 1 month   Cancel: 1/06/22  No-show: none  Next appt: none       Medication requested:  gabapentin (NEURONTIN) 300 MG capsule 60 capsule 0 2/14/2022  No   Sig - Route: Take 1 capsule (300 mg) by mouth 3 times daily as needed for other (anxiety) - Oral   Sent to pharmacy as: Gabapentin 300 MG Oral Capsule (NEURONTIN)   Class: E-Prescribe   Notes to Pharmacy: Please note this is a dose increase. Pt received 30 capsules on 2/11/22. Please fill the remaining 60 capsules now. Order is for 90 capsules/30 days.   Order: [id number]   E-Prescribing Status: Receipt confirmed by pharmacy (2/14/2022  5:25 PM CST)       [place] per [place]  05/25/21 #270, 02/11/22 #30, 02/18/22 #60        Medication sent to provider for review.

## 2022-04-22 ENCOUNTER — HOSPITAL ENCOUNTER (EMERGENCY)
Facility: HOSPITAL | Age: 32
Discharge: LEFT BEFORE DISCHARGE | End: 2022-04-22
Attending: EMERGENCY MEDICINE | Admitting: EMERGENCY MEDICINE
Payer: COMMERCIAL

## 2022-04-22 VITALS
WEIGHT: 205 LBS | DIASTOLIC BLOOD PRESSURE: 65 MMHG | TEMPERATURE: 98.7 F | BODY MASS INDEX: 29.41 KG/M2 | RESPIRATION RATE: 18 BRPM | OXYGEN SATURATION: 97 % | SYSTOLIC BLOOD PRESSURE: 137 MMHG | HEART RATE: 82 BPM

## 2022-04-22 DIAGNOSIS — Z13.9 ENCOUNTER FOR MEDICAL SCREENING EXAMINATION: ICD-10-CM

## 2022-04-22 LAB
ALBUMIN SERPL-MCNC: 4.4 G/DL (ref 3.5–5)
ALP SERPL-CCNC: 80 U/L (ref 45–120)
ALT SERPL W P-5'-P-CCNC: 55 U/L (ref 0–45)
ANION GAP SERPL CALCULATED.3IONS-SCNC: 10 MMOL/L (ref 5–18)
AST SERPL W P-5'-P-CCNC: 31 U/L (ref 0–40)
BILIRUB SERPL-MCNC: 0.6 MG/DL (ref 0–1)
BUN SERPL-MCNC: 8 MG/DL (ref 8–22)
CALCIUM SERPL-MCNC: 9.5 MG/DL (ref 8.5–10.5)
CHLORIDE BLD-SCNC: 106 MMOL/L (ref 98–107)
CO2 SERPL-SCNC: 28 MMOL/L (ref 22–31)
CREAT SERPL-MCNC: 1.06 MG/DL (ref 0.6–1.3)
ERYTHROCYTE [DISTWIDTH] IN BLOOD BY AUTOMATED COUNT: 12.5 % (ref 10–15)
ETHANOL SERPL-MCNC: <10 MG/DL
GFR SERPL CREATININE-BSD FRML MDRD: >90 ML/MIN/1.73M2
GLUCOSE BLD-MCNC: 87 MG/DL (ref 70–125)
HCT VFR BLD AUTO: 45.4 % (ref 35–53)
HGB BLD-MCNC: 15.7 G/DL (ref 11.7–17.7)
MAGNESIUM SERPL-MCNC: 2 MG/DL (ref 1.8–2.6)
MCH RBC QN AUTO: 31 PG (ref 26.5–33)
MCHC RBC AUTO-ENTMCNC: 34.6 G/DL (ref 31.5–36.5)
MCV RBC AUTO: 90 FL (ref 78–100)
PLATELET # BLD AUTO: 238 10E3/UL (ref 150–450)
POTASSIUM BLD-SCNC: 4.6 MMOL/L (ref 3.5–5)
PROT SERPL-MCNC: 7.3 G/DL (ref 6–8)
RBC # BLD AUTO: 5.07 10E6/UL (ref 3.8–5.9)
SODIUM SERPL-SCNC: 144 MMOL/L (ref 136–145)
TSH SERPL DL<=0.005 MIU/L-ACNC: 1.13 UIU/ML (ref 0.3–5)
WBC # BLD AUTO: 5.8 10E3/UL (ref 4–11)

## 2022-04-22 PROCEDURE — 83735 ASSAY OF MAGNESIUM: CPT | Performed by: NURSE PRACTITIONER

## 2022-04-22 PROCEDURE — 82077 ASSAY SPEC XCP UR&BREATH IA: CPT | Performed by: NURSE PRACTITIONER

## 2022-04-22 PROCEDURE — 85027 COMPLETE CBC AUTOMATED: CPT | Performed by: NURSE PRACTITIONER

## 2022-04-22 PROCEDURE — 84443 ASSAY THYROID STIM HORMONE: CPT | Performed by: NURSE PRACTITIONER

## 2022-04-22 PROCEDURE — 999N000104 HC STATISTIC NO CHARGE

## 2022-04-22 PROCEDURE — 36415 COLL VENOUS BLD VENIPUNCTURE: CPT | Performed by: NURSE PRACTITIONER

## 2022-04-22 PROCEDURE — 80053 COMPREHEN METABOLIC PANEL: CPT | Performed by: NURSE PRACTITIONER

## 2022-04-22 NOTE — ED PROVIDER NOTES
"I attempted to find patient in the waiting room several times and patient was not in waiting room. I called patient at listed home phone number [phone], Miles answered and noted \"I actually went to another hospital\" and [place] declines to offer which one, no SI/HI, amenable to wait there to be seen.     Shazia Kwon MD  04/22/22 4926    "

## 2022-04-22 NOTE — ED PROVIDER NOTES
ED Provider In Triage Note  [facility]  Encounter Date: Apr 22, 2022    Chief Complaint   Patient presents with     Paranoid       Brief HPI:   Parveen Kaplan is a 31 year old adult presenting to the Emergency Department with a chief complaint of paranoia. Symptoms started yesterday. Intermittent myoclonic jerking. Feeling anxious. No SI though has felt depressed.    Brief Physical Exam:  /65   Pulse 82   Temp 98.7  F (37.1  C) (Temporal)   Resp 18   Wt 93 kg (205 lb)   SpO2 97%   BMI 29.41 kg/m    General: Non-toxic appearing  HEENT: Atraumatic  Resp: No respiratory distress  Abdomen: Non-peritoneal  Neuro: Alert, oriented, answers questions appropriately  Psych: Behavior appropriate      Plan Initiated in Triage:  TSH, CBC, CMP, Magnesium, ETOH level      PIT Dispo:   Return to lobby while awaiting workup and ED bed availability    LUISA Staton CNP on 4/22/2022 at 12:29 PM    Patient was evaluated by the provider in Triage due to a limitation of available rooms in the Emergency Department. A plan of care was discussed based on the information obtained on the initial evaluation and patient was consuled to return back to the Emergency Department lobby after this initial evalutaiton until results were obtained or a room became available in the Emergency Department. Patient was counseled not to leave prior to receiving the results of their workup.          Higinio Kinney APRN CNP  04/22/22 4522

## 2022-05-09 DIAGNOSIS — F31.70 BIPOLAR AFFECTIVE DISORDER IN REMISSION (H): ICD-10-CM

## 2022-05-09 NOTE — TELEPHONE ENCOUNTER
Last seen: 12/2/21  RTC: 1 month  Cancel: 1/6  No-show: 0  Next appt: 6/7    Incoming refill from pharmacy via fax    Medication requested: lamoTRIgine (LAMICTAL) 200 MG tablet  Directions: Sig - Route: Take 1.5 tablets (300 mg) by mouth daily - Oral  Qty: 135  Last refilled: 2/12    Per pharmacy, patient last picked up Effexor 150 mg on 3/12 for 90 ds    Medication refill pended and routed to provider for approval

## 2022-05-10 RX ORDER — LAMOTRIGINE 200 MG/1
300 TABLET ORAL DAILY
Qty: 45 TABLET | Refills: 0 | Status: SHIPPED | OUTPATIENT
Start: 2022-05-10 | End: 2022-06-07

## 2022-05-10 NOTE — TELEPHONE ENCOUNTER
Placed a call to [id number] - [place], [place] - [address] AT [address] to verify if patient has been filling Lamictal 300 mg regularly.    Pharmacy staff confirmed that patient has been picking the med up regularly.    - A 90 ds script sent on 4/14/21 was filled on 5/23/21.   - 8/10 script was filled on 8/17 and refilled on 11/13.  - 5/25 Rx was filled on 2/6/22.

## 2022-05-10 NOTE — TELEPHONE ENCOUNTER
Melba Donaldson RN confirmed with pharmacy that 90 day supply last filled in 2/6/22.  Will refill for 1 month supply.

## 2022-06-04 DIAGNOSIS — F31.70 BIPOLAR AFFECTIVE DISORDER IN REMISSION (H): ICD-10-CM

## 2022-06-07 ENCOUNTER — VIRTUAL VISIT (OUTPATIENT)
Dept: PSYCHIATRY | Facility: CLINIC | Age: 32
End: 2022-06-07
Attending: NURSE PRACTITIONER
Payer: COMMERCIAL

## 2022-06-07 DIAGNOSIS — F41.0 PANIC ATTACKS: ICD-10-CM

## 2022-06-07 DIAGNOSIS — F64.9 GENDER IDENTITY DISORDER: ICD-10-CM

## 2022-06-07 DIAGNOSIS — F31.70 BIPOLAR AFFECTIVE DISORDER IN REMISSION (H): ICD-10-CM

## 2022-06-07 PROCEDURE — 99214 OFFICE O/P EST MOD 30 MIN: CPT | Mod: 95 | Performed by: NURSE PRACTITIONER

## 2022-06-07 RX ORDER — VENLAFAXINE HYDROCHLORIDE 150 MG/1
300 CAPSULE, EXTENDED RELEASE ORAL DAILY
Qty: 180 CAPSULE | Refills: 1 | Status: SHIPPED | OUTPATIENT
Start: 2022-06-07 | End: 2022-10-18

## 2022-06-07 RX ORDER — HYDROXYZINE HYDROCHLORIDE 25 MG/1
25 TABLET, FILM COATED ORAL EVERY 8 HOURS PRN
Qty: 30 TABLET | Refills: 1 | Status: SHIPPED | OUTPATIENT
Start: 2022-06-07 | End: 2022-10-18

## 2022-06-07 RX ORDER — GABAPENTIN 600 MG/1
600 TABLET ORAL 3 TIMES DAILY PRN
Qty: 270 TABLET | Refills: 0 | Status: SHIPPED | OUTPATIENT
Start: 2022-06-07 | End: 2022-10-18

## 2022-06-07 RX ORDER — LAMOTRIGINE 25 MG/1
TABLET ORAL
Qty: 30 TABLET | Refills: 1 | Status: SHIPPED | OUTPATIENT
Start: 2022-06-07 | End: 2022-08-03

## 2022-06-07 RX ORDER — LAMOTRIGINE 200 MG/1
TABLET ORAL
Qty: 135 TABLET | Refills: 0 | Status: SHIPPED | OUTPATIENT
Start: 2022-06-07 | End: 2022-06-13

## 2022-06-07 RX ORDER — LAMOTRIGINE 200 MG/1
TABLET ORAL
Qty: 45 TABLET | Refills: 0 | OUTPATIENT
Start: 2022-06-07

## 2022-06-07 ASSESSMENT — PATIENT HEALTH QUESTIONNAIRE - PHQ9
SUM OF ALL RESPONSES TO PHQ QUESTIONS 1-9: 9
SUM OF ALL RESPONSES TO PHQ QUESTIONS 1-9: 9
10. IF YOU CHECKED OFF ANY PROBLEMS, HOW DIFFICULT HAVE THESE PROBLEMS MADE IT FOR YOU TO DO YOUR WORK, TAKE CARE OF THINGS AT HOME, OR GET ALONG WITH OTHER PEOPLE: SOMEWHAT DIFFICULT

## 2022-06-07 ASSESSMENT — ANXIETY QUESTIONNAIRES
8. IF YOU CHECKED OFF ANY PROBLEMS, HOW DIFFICULT HAVE THESE MADE IT FOR YOU TO DO YOUR WORK, TAKE CARE OF THINGS AT HOME, OR GET ALONG WITH OTHER PEOPLE?: VERY DIFFICULT
GAD7 TOTAL SCORE: 12
GAD7 TOTAL SCORE: 12
6. BECOMING EASILY ANNOYED OR IRRITABLE: SEVERAL DAYS
GAD7 TOTAL SCORE: 12
2. NOT BEING ABLE TO STOP OR CONTROL WORRYING: MORE THAN HALF THE DAYS
3. WORRYING TOO MUCH ABOUT DIFFERENT THINGS: MORE THAN HALF THE DAYS
7. FEELING AFRAID AS IF SOMETHING AWFUL MIGHT HAPPEN: MORE THAN HALF THE DAYS
5. BEING SO RESTLESS THAT IT IS HARD TO SIT STILL: MORE THAN HALF THE DAYS
4. TROUBLE RELAXING: SEVERAL DAYS
1. FEELING NERVOUS, ANXIOUS, OR ON EDGE: MORE THAN HALF THE DAYS
7. FEELING AFRAID AS IF SOMETHING AWFUL MIGHT HAPPEN: MORE THAN HALF THE DAYS

## 2022-06-07 NOTE — PATIENT INSTRUCTIONS
-Increase Lamotrigine to 325 mg daily for mood.  -Gabapentin is changed to 600 mg up to 3 times a day as needed for anxiety and sleep.  -Continue all other medication regimen for now.  Pls check your blood pressure x2-3/week for next 2 weeks and send it to luis via [employer].  If this is normal, will consider starting stimulant for ADHD.    Your next appointment is scheduled on 7/5/2022 (Tue) at 3:30pm.      **For crisis resources, please see the information at the end of this document**   Patient Education    Thank you for coming to the [facility] [place] [facility].     Lab Testing:  If you had lab testing today and your results are reassuring or normal they will be mailed to you or sent through [employer] within 7 days. If the lab tests need quick action we will call you with the results. The phone number we will call with results is # [phone]. If this is not the best number please call our clinic and change the number.     Medication Refills:  If you need any refills please call your pharmacy and they will contact us. Our fax number for refills is [phone].   Three business days of notice are needed for general medication refill requests.   Five business days of notice are needed for controlled substance refill requests.   If you need to change to a different pharmacy, please contact the new pharmacy directly. The new pharmacy will help you get your medications transferred.     Contact Us:  Please call [phone] during business hours (8-5:00 M-F).   If you have medication related questions after clinic hours, or on the weekend, please call [phone].     Financial Assistance [phone]   Medical Records [phone]       MENTAL HEALTH CRISIS RESOURCES:  For a emergency help, please call 911 or go to the nearest Emergency Department.     Emergency Walk-In Options:   [facility]): [phone] - Specialized mental health emergency area  designed to be calming  [facility] ([place]): [phone]  [facility] ([place]): [phone]  [facility] ([place]): [phone]    [facility] Crisis Information:   [place]: [phone]  [place]: [phone]  [place] ([place]) - Adult: [phone]     Child: [phone]  [place] - Adult: [phone]     Child: [phone]  [place]: [phone]  List of all [facility] resources:   https://[place].gov/dhs/people-we-serve/adults/health-care/mental-health/resources/crisis-contacts.jsp    National Crisis Information:   Crisis Text Line: Text  [place]  to 776601  National Suicide Prevention Lifeline: 5-852-396-TALK (1-612.612.1228)       For online chat options, visit https://suicidepreventionlifeline.org/chat/  Poison Control Center: 1-238.478.6724  Trans Lifeline: 1-801.182.8409 - Hotline for transgender people of all ages  The [employer]: 1-244.958.6600 - Hotline for LGBT youth     For Non-Emergency Support:   Fast Tracker: Mental Health & Substance Use Disorder Resources -   https://www.[employer].org/

## 2022-06-07 NOTE — PROGRESS NOTES
Parveen JOSUE Eusebio is a 32 year old who has consented to receive services via billable video visit.      Pt will join video visit via: [employer]  If there are problems joining the visit, send backup video invite via: Text to preferred phone: [phone]      Originating Location (patient location): Place of employment  Distant Location (provider location): [facility] [place] [facility]    Will anyone else be joining the video visit? No    How would you prefer to obtain AVS?: Samy

## 2022-06-07 NOTE — CONFIDENTIAL NOTE
Start Time:  0900         End Time: 0924    Telemedicine Visit: The patient's condition can be safely assessed and treated via synchronous audio and visual telemedicine encounter.      Reason for Telemedicine Visit: Due to COVID 19 pandemic, clinic switching all appointments to telemedicine     Originating Site (Patient Location): Patient's home    Distant Site (Provider Location): Provider Remote Setting    Consent:  The patient/guardian has verbally consented to: the potential risks and benefits of telemedicine (video visit) versus in person care; bill my insurance or make self-payment for services provided; and responsibility for payment of non-covered services.     Mode of Communication:  Video Conference via [email or web address]    As the provider I attest to compliance with applicable laws and regulations related to telemedicine.    Psychiatry Clinic Progress Note                                                                  Patient Name: Parveen Kaplan  YOB: 1990  MRN: [id number]  Date of Service:  06/07/2022  Last Seen:12/2/2021    Parveen Kaplan is a 32 year old person assigned female at birth, identifies as male who uses the name Omar and pronoun kareen.       Omar Kaplan is a 32 year old year old adult who is being evaluated via a billable telepsychiatry visit for ongoing psychiatric care.      Omar Kaplan was last seen on 12/2/2021.    At that time,     Medication Ordered/Consults/Labs/tests Ordered:      Medication: Continue on current medication regimen.  OTC Recommendations: cautious use of SAD light  Lab Orders:  none  Referrals: none  Release of Information: none  Future Treatment Considerations: Per symptoms.   Return for Follow Up: in 1 month     Pertinent Background:  Diagnoses include bipolar disorder, PTSD and AKIN.  Psych critical item history includes suicidal ideation, SIB [burning during HS], trauma hx and psych hosp (<3).      Previous medication trial: Buspar, Ativan, Clonidine  "(tremor), Seroquel (oversedation at 25mg, nausea, dizziness), Gabapentin (higher than 300 mg TID oversedation), Paxil (increased SI), Prazosin (oversedation), Remeron (not effective)     [All pronouns should read as \"he\"]     Therapist: Omer Cotton  Hormone Care: BALAJI Reed    Interim History                                                                                                        4, 4     On 4/22/2022, pt was seen at [facility] ED for anxiety and paranoia in context of medical cannabis use which is new as a couple weeks ago.  Possibly VH.  Pt thought this may be due to anxiety, was referred to PHP at [facility].  Valium 2.5 mg once was given at ED, but no other medication changes.    Since the last visit,  -No Paranoia or VH.  This was 2 day episode.  Pt reported 1 day before going to ED, he felt that the world is going to end and floor was made out lava and he could not get out from the bed.  During that time, anxiety was significant, but since then anxiety has been also fairly well managed.  -Medical cannabis tincture was started couple months prior to the ED visit and has been using the same tincture.  Did not have any sxs like this before when he started medical cannabis.  However, he had similar episode when anxiety was significant before.  -Notes mood is OK, but states \"not low, but has been 'sánchez' for couple months.\"  Denies SI, SIB or HI.  -Sleep has been good last couple days, but 2 weeks prior to this, it was difficult to stay sleep for 4-5 days, but also notes missed medications those days.  [place] maybe was having nightmares that caused him to wake up.  -Has been taking Gabapentin 600 mg in AM only and takes 300 mg x1/week in afternoon.  Has not been in school in spring semester and feels this is the reason that he only needed Gabapentin with this dose.  When he was taking classes in fall, he was taking 300 mg TID mostly.  Plans to return to school in fall.  -Wanted to come down on " Effexor in the future.  Effexor was originally started for migraine and now migraine is more manageable, only x5-6/year.  -Also since he is returning back to school in fall and wanted to try stimulant for ADHD especially his LFT is going back to WNL.  Hepatology did not see concern with stimulant with LFT.  -Does not have means to track BP, but able to get one.    Denies any symptoms suggestive of hypomania or psychosis.    Current Suicidality/Hx of Suicide Attempts: Denies both  CoCominent Medical concerns:Denies    Medication Side Effects: The patient denies all medication side effects.      Medical Review of Systems     Apart from the symptoms mentioned int he HPI, the 14 point review of systems, including constitutional, HEENT, cardiovascular, respiratory, gastrointestinal, genitourinary, musculoskeletal, integumentary, endocrine, neurological, hematologic and allergic is entirely negative.    Pregnant: None. Nursing: None, Contraception: partner not producing sperm    Substance Use     Denies frequent or abuse of alcohol. Social drinking rarely, when he drinks less than 2-3 drinks. Denies any other substance use.     Medical cannabis use daily.    Social/ Family History                                  [per patient report]                                 1ea,1ea     Living arrangements: living with partner in his parent's home and feels safe  Social Support: tyler family  Access to gun: denies    Allergy                                Imitrex [sumatriptan]    Current Medications                                                                                                       Current Outpatient Medications   Medication Sig Dispense Refill     albuterol (PROAIR HFA/PROVENTIL HFA/VENTOLIN HFA) 108 (90 Base) MCG/ACT inhaler Inhale 2 puffs into the lungs every 6 hours 1 Inhaler 0     gabapentin (NEURONTIN) 300 MG capsule Take 1 capsule (300 mg) by mouth 3 times daily as needed for other (anxiety) For more  "refills,schedule an appointment at [phone] 90 capsule 1     hydrOXYzine (ATARAX) 25 MG tablet Take 1 tablet (25 mg) by mouth every 8 hours as needed for anxiety 30 tablet 1     lamoTRIgine (LAMICTAL) 200 MG tablet Take 1.5 tablets (300 mg) by mouth daily 45 tablet 0     naloxone (NARCAN) 4 MG/0.1ML nasal spray Spray 1 spray (4 mg) into one nostril alternating nostrils as needed for opioid reversal every 2-3 minutes until assistance arrives 0.2 mL 11     ondansetron (ZOFRAN) 4 MG tablet Take 1 tablet (4 mg) by mouth every 8 hours as needed for nausea 12 tablet 0     syringe/needle, disp, 25G X 1\" 1 ML MISC To use with weekly IM injection 10 each 3     testosterone (TESTOPEL) 75 MG subcutaneous implant pellet 825 mg by Subdermal route every 30 days       venlafaxine (EFFEXOR-XR) 150 MG 24 hr capsule Take 2 capsules (300 mg) by mouth daily 180 capsule 1         Vitals                                                                                                                       3, 3   There were no vitals taken for this visit.        Mental Status Exam                                                                                   9, 14 cog        Alertness: alert  and oriented  Appearance:  Casually dressed and Well groomed  Behavior/Demeanor: cooperative, pleasant and calm, with good  eye contact   Speech: regular rate and rhythm  Mood :  \"okay\"  Affect: mostly full range; was congruent to mood; was congruent to content  Thought Process (Associations):  Logical, Linear and Goal directed  Thought process (Rate):  Normal  Thought content:  no overt psychosis, denies suicidal ideation, intent or thoughts and patient does not appear to be responding to internal stimuli  Perception:  Reports none;  Denies auditory hallucinations and visual hallucinations  Attention/Concentration:  Normal  Memory:  Immediate recall intact and Short-term memory intact  Language: intact  Fund of Knowledge/Intelligence:  " Average  Abstraction:  Normal  Insight:  Good  Judgment:  Good  Cognition: (6) does  appear grossly intact; formal cognitive testing was not done    Physical Exam     Motor activity/EPS:  Normal  Psychomotor: normal or unremarkable    Labs and Results      Pertinent findings on review include: Review of records with relevant information reported in the HPI.  Reviewed pt's past medical record and obtained collateral information.      [place] PRESCRIPTION MONITORING PROGRAM [] was checked today:  indicates Gabapentin 5/6, 3/29, 2/18, 2/11.    Answers for HPI/ROS submitted by the patient on 6/7/2022  If you checked off any problems, how difficult have these problems made it for you to do your work, take care of things at home, or get along with other people?: Somewhat difficult  PHQ9 TOTAL SCORE: 9  AKIN 7 TOTAL SCORE: 12    PHQ 7/8/2020 2/3/2021 2/17/2021   PHQ-9 Total Score 10 5 5   Q9: Thoughts of better off dead/self-harm past 2 weeks Not at all Not at all Not at all       AKIN-7 SCORE 1/15/2020 7/8/2020 2/3/2021   Total Score - - 7 (mild anxiety)   Total Score 9 13 7   Total Score - - -       Recent Labs   Lab Test 04/22/22  1322 04/12/21  1329   CR 1.06 1.10*   GFRESTIMATED >90 68     Recent Labs   Lab Test 04/22/22  1322 05/10/21  1307 04/12/21  1329   AST 31 36 45   ALT 55* 92* 107*   ALKPHOS 80  --  102     3/31/2021: ALT 89,   6/8/2020: ALT 82,   6/1/2020: ALT 81,   11/19/2020 ALT 63,   1/13/2019: ALT 22     Vitamin D 24 (4/12/2021)     PSYCHOTROPIC DRUG INTERACTIONS:    Hydroxyzine + Effexor increases risk of QTc prolongation     MANAGEMENT:  Monitoring for adverse effects, pt aware of risks, routine EKG, minimum use of hydroxyzine    Impression/Assessment      Omar Kaplan is a 32 year old adult  who presents for med management follow up.  Pt sounds mostly stable in his mood and anxiety, denies SI, SIB or HI during the appointment.  However, pt noted some low mood for couple months.  Pt also has hx of worsening  depression in the summer.  Discussed possibility of increasing Lamictal to 325 mg daily while continuing all other medication regimen for now.  But pt hopes to taper down Effexor as the medication was originally prescribed for migraine prevention and it has been managing well.  Pt also discussed possible trial of stimulant for ADHD as the school starts.  Reviewed BP and he had one episode of elevated BP.  Discussed Effexor and stimulant could both elevate BP.  Pt was instructed to monitor BP x2-3/week in next 2 week and presented in [place].  If BP was relatively WNL, may consider trial of stimulant while continuing to monitor BP.  If BP was elevated, will reduce Effexor once when mood is more stable, then once when mood is more stable to try Effexor in the future.    Since pt is taking Gabapentin less, but higher dose and will plan to take more frequently in fall when he starts school, will change to 600 mg TID PRN for anxiety and sleep.    Diagnosis                                                                   Bipolar 2 disorder  PTSD  AKIN  Hx of ADHD    Treatment Recommendation & Plan       Medication Ordered/Consults/Labs/tests Ordered:     Medication:   -Increase Lamotrigine to 325 mg daily for mood.  -Gabapentin is changed to 600 mg up to 3 times a day as needed for anxiety and sleep.  -Continue all other medication regimen for now.  Pls check your blood pressure x2-3/week for next 2 weeks and send it to luis via [employer].  If this is normal, will consider starting stimulant for ADHD.  OTC Recommendations: none  Lab Orders:  none  Referrals: none  Release of Information: none  Future Treatment Considerations: Per symptoms.   Return for Follow Up: in 1 month    -Discussed safety plan for suicidal thoughts  -Discussed plan for suicidality  -Discussed available emergency services  -Patient agrees with the treatment plan  -Encouraged to continue outpatient therapy to gain more coping mechanism for  stress.    Treatment Risk Statement: Discussed with the patient my impressions, as well as recommended studies. I educated patient on the differential diagnosis and prognosis. I discussed with the patient the risks and benefits of medications versus no interventions, including efficacy, dose, possible side effects and length of treatment and the importance of medication compliance.  The patient understands the risks, benefits, adverse effects and alternatives. Agrees to treatment with the capacity to do so. No medical contraindications to treatment. The patient also understands the risks of using street drugs or alcohol.     CRISIS NUMBERS:   Provided routinely in AVS.    Diagnosis or treatment significantly limited by social determinants of health.    Dayan Blackmon, KOURTNEY,  06/07/2022

## 2022-06-17 DIAGNOSIS — F31.70 BIPOLAR AFFECTIVE DISORDER IN REMISSION (H): ICD-10-CM

## 2022-06-17 RX ORDER — LAMOTRIGINE 200 MG/1
300 TABLET ORAL DAILY
Qty: 135 TABLET
Start: 2022-06-17 | End: 2022-09-15

## 2022-08-03 DIAGNOSIS — F31.70 BIPOLAR AFFECTIVE DISORDER IN REMISSION (H): ICD-10-CM

## 2022-08-03 RX ORDER — LAMOTRIGINE 25 MG/1
TABLET ORAL
Qty: 30 TABLET | Refills: 1 | Status: SHIPPED | OUTPATIENT
Start: 2022-08-03 | End: 2022-10-03

## 2022-08-03 NOTE — TELEPHONE ENCOUNTER
Last Seen: 6/7/22  RTC: 1 month  Cancel: x1 7/5/22  No-Show: 0  Next Appt: None    Incoming Refill From pharmacy via fax    Medication Requested: lamoTRIgine (LAMICTAL) 25 MG tablet  Directions: Take 1 tab daily together with 1.5 tabs of 200 mg to make total of 325 mg daily.  Qty: 30  Last Refill: 7/5/22    Medication Refill Pended Per Refill Protocol  (Outside RTC timeframe)  Pended to Dayan Blackmon

## 2022-08-29 ENCOUNTER — TELEPHONE (OUTPATIENT)
Dept: DERMATOLOGY | Facility: CLINIC | Age: 32
End: 2022-08-29

## 2022-08-29 ENCOUNTER — VIRTUAL VISIT (OUTPATIENT)
Dept: DERMATOLOGY | Facility: CLINIC | Age: 32
End: 2022-08-29
Payer: COMMERCIAL

## 2022-08-29 DIAGNOSIS — L70.0 ACNE VULGARIS: Primary | ICD-10-CM

## 2022-08-29 PROCEDURE — 99203 OFFICE O/P NEW LOW 30 MIN: CPT | Mod: 95 | Performed by: DERMATOLOGY

## 2022-08-29 RX ORDER — CLINDAMYCIN PHOSPHATE 10 UG/ML
LOTION TOPICAL 2 TIMES DAILY
Qty: 60 ML | Refills: 11 | Status: SHIPPED | OUTPATIENT
Start: 2022-08-29

## 2022-08-29 RX ORDER — TRETINOIN 0.25 MG/G
CREAM TOPICAL AT BEDTIME
Qty: 45 G | Refills: 3 | Status: SHIPPED | OUTPATIENT
Start: 2022-08-29

## 2022-08-29 ASSESSMENT — PATIENT HEALTH QUESTIONNAIRE - PHQ9: SUM OF ALL RESPONSES TO PHQ QUESTIONS 1-9: 11

## 2022-08-29 NOTE — PROGRESS NOTES
[facility] Dermatology Note  Encounter Date: Aug 29, 2022  Store-and-Forward and Telephone ([phone]). Location of teledermatologist: [facility] [place].  Start time: 12:23. End time: 12:34.    Dermatology Problem List:  1. Acne vulgaris: tretinoin 0.025% cream, clindamycin 1% lotion, BPO 5% wash  - prior: [place] triamcinolone for persistent lesions, oral antibiotics    History of NAFLD  ____________________________________________    Assessment & Plan:     1. Acne vulgaris: mixed comedonal/inflammatory with scarring, in context of MTF testosterone therapy. We discussed risks/benefits of topicals +/- oral antibiotics vs isotretinoin; given history of NAFLD and chronic Strep infections, favor topical therapy alone.   - start tretinoin 0.025% cream, BPO wash, clindamycin 1% lotion    Procedures Performed:    None    Follow-up: 3-4 months    Staff:     Kt Barrera MD, FAAD   of Dermatology  Department of Dermatology  [facility]    ____________________________________________    CC: Telephone (Acne )    HPI:  Mr. Parveen Kaplan is a(n) 32 year old adult who presents today as a new patient for acne vulgaris    Acne vulgaris - had as teenager, worse with hormone replacement therapy  - has lowered dose of testosterone, which has been helpful  - regularly gets cystic lesions on shoulders, jawline near ear  - lots of blackheads  - prior oral antibiotics - has liver disease; chronic Strep throat  - combination tretinoin & clindamycin helpful    Patient is otherwise feeling well, without additional skin concerns.    Labs Reviewed:  N/A    Physical Exam:  Vitals: There were no vitals taken for this visit.  SKIN: Teledermatology photos were reviewed; image quality and interpretability: acceptable. Image date: 8/29/22.  - acneiform papules on the face and torso  - No other lesions of concern on areas examined.  "    Medications:  Current Outpatient Medications   Medication     albuterol (PROAIR HFA/PROVENTIL HFA/VENTOLIN HFA) 108 (90 Base) MCG/ACT inhaler     CANNABIDIOL PO     gabapentin (NEURONTIN) 600 MG tablet     hydrOXYzine (ATARAX) 25 MG tablet     lamoTRIgine (LAMICTAL) 200 MG tablet     lamoTRIgine (LAMICTAL) 25 MG tablet     naloxone (NARCAN) 4 MG/0.1ML nasal spray     ondansetron (ZOFRAN) 4 MG tablet     testosterone (TESTOPEL) 75 MG subcutaneous implant pellet     venlafaxine (EFFEXOR XR) 150 MG 24 hr capsule     syringe/needle, disp, 25G X 1\" 1 ML MISC     No current facility-administered medications for this visit.      Past Medical/Surgical History:   Patient Active Problem List   Diagnosis     Migraine     Panic attacks     Depression with anxiety     History of psychiatric care     Major depression in complete remission (H)     Past Medical History:   Diagnosis Date     Acne      Female-to-male transgender person        CC Referred Self, MD  No address on file on close of this encounter.  "

## 2022-08-29 NOTE — NURSING NOTE
Chief Complaint   Patient presents with     Telephone     Acne    Teledermatology Nurse Call Patients:     Are you in the [facility] at the time of the encounter? yes    Today's visit will be billed to you and your insurance.    A teledermatology visit is not as thorough as an in-person visit and the quality of the photograph sent may not be of the same quality as that taken by the dermatology clinic.

## 2022-08-29 NOTE — LETTER
8/29/2022       RE: Parveen Kaplan  [address]  [place] [address]     Dear Colleague,    Thank you for referring your patient, Parveen Kaplan, to the [facility]MAT[facility] [place] [facility]. Please see a copy of my visit note below.    [facility] Dermatology Note  Encounter Date: Aug 29, 2022  Store-and-Forward and Telephone ([phone]). Location of teledermatologist: [facility] [place].  Start time: 12:23. End time: 12:34.    Dermatology Problem List:  1. Acne vulgaris: tretinoin 0.025% cream, clindamycin 1% lotion, BPO 5% wash  - prior: [place] triamcinolone for persistent lesions, oral antibiotics    History of NAFLD  ____________________________________________    Assessment & Plan:     1. Acne vulgaris: mixed comedonal/inflammatory with scarring, in context of MTF testosterone therapy. We discussed risks/benefits of topicals +/- oral antibiotics vs isotretinoin; given history of NAFLD and chronic Strep infections, favor topical therapy alone.   - start tretinoin 0.025% cream, BPO wash, clindamycin 1% lotion    Procedures Performed:    None    Follow-up: 3-4 months    Staff:     Kt Barrera MD, FAAD   of Dermatology  Department of Dermatology  [facility]    ____________________________________________    CC: Telephone (Acne )    HPI:  Mr. Parveen Kaplan is a(n) 32 year old adult who presents today as a new patient for acne vulgaris    Acne vulgaris - had as teenager, worse with hormone replacement therapy  - has lowered dose of testosterone, which has been helpful  - regularly gets cystic lesions on shoulders, jawline near ear  - lots of blackheads  - prior oral antibiotics - has liver disease; chronic Strep throat  - combination tretinoin & clindamycin helpful    Patient is otherwise feeling well, without additional skin  "concerns.    Labs Reviewed:  N/A    Physical Exam:  Vitals: There were no vitals taken for this visit.  SKIN: Teledermatology photos were reviewed; image quality and interpretability: acceptable. Image date: 8/29/22.  - acneiform papules on the face and torso  - No other lesions of concern on areas examined.     Medications:  Current Outpatient Medications   Medication     albuterol (PROAIR HFA/PROVENTIL HFA/VENTOLIN HFA) 108 (90 Base) MCG/ACT inhaler     CANNABIDIOL PO     gabapentin (NEURONTIN) 600 MG tablet     hydrOXYzine (ATARAX) 25 MG tablet     lamoTRIgine (LAMICTAL) 200 MG tablet     lamoTRIgine (LAMICTAL) 25 MG tablet     naloxone (NARCAN) 4 MG/0.1ML nasal spray     ondansetron (ZOFRAN) 4 MG tablet     testosterone (TESTOPEL) 75 MG subcutaneous implant pellet     venlafaxine (EFFEXOR XR) 150 MG 24 hr capsule     syringe/needle, disp, 25G X 1\" 1 ML MISC     No current facility-administered medications for this visit.      Past Medical/Surgical History:   Patient Active Problem List   Diagnosis     Migraine     Panic attacks     Depression with anxiety     History of psychiatric care     Major depression in complete remission (H)     Past Medical History:   Diagnosis Date     Acne      Female-to-male transgender person        CC Referred Self, MD  No address on file on close of this encounter.      "

## 2022-08-29 NOTE — PATIENT INSTRUCTIONS
Tretinoin: use pea-sized amount to whole face every 2-3 nights. Gradually increase to nightly use as tolerated over 2-3 months.    Benzoyl peroxide wash: use in the morning every 2-3 days. Gradually increase to daily use as tolerated over 2-3 months. Do not use at the same time as tretinoin, since this may lead to reduced effectiveness. Benzoyl peroxide may bleach linens (but not skin).    Clindamycin: use once to twice daily as needed for spot treatment.

## 2022-09-14 DIAGNOSIS — F31.70 BIPOLAR AFFECTIVE DISORDER IN REMISSION (H): ICD-10-CM

## 2022-09-15 RX ORDER — LAMOTRIGINE 200 MG/1
TABLET ORAL
Qty: 45 TABLET | Refills: 0 | Status: SHIPPED | OUTPATIENT
Start: 2022-09-15 | End: 2022-10-10

## 2022-09-15 NOTE — TELEPHONE ENCOUNTER
lamoTRIgine (LAMICTAL) 200 MG  Last refilled: 6/17/22  Qty: 135    Last seen: 6/7/22  RTC: 7/5  Cancel: 7/5  No-show: 0  Next appt: NONE  Scheduling has been notified to contact the pt for appointment.  30 DAY VIN Refill pended and routed to the provider for review/determination due to : Pt outside of RTC timeframe WITH CANCEL X 1

## 2022-10-03 DIAGNOSIS — F31.70 BIPOLAR AFFECTIVE DISORDER IN REMISSION (H): ICD-10-CM

## 2022-10-03 RX ORDER — LAMOTRIGINE 25 MG/1
TABLET ORAL
Qty: 30 TABLET | Refills: 0 | Status: SHIPPED | OUTPATIENT
Start: 2022-10-03 | End: 2022-10-18

## 2022-10-03 NOTE — TELEPHONE ENCOUNTER
lamoTRIgine (LAMICTAL) 25 MG  Last refilled: 8/3/22  Qty: 30: 1    Last seen: 6/7/22  RTC: 7/5  Cancel: 7/5  No-show: 0  Next appt: 10/18/22  Refill pended and routed to the provider for review/determination due to: Pt outside of RTC timeframe WITH CANCEL X 1

## 2022-10-10 ENCOUNTER — MYC REFILL (OUTPATIENT)
Dept: PSYCHIATRY | Facility: CLINIC | Age: 32
End: 2022-10-10

## 2022-10-10 ENCOUNTER — HEALTH MAINTENANCE LETTER (OUTPATIENT)
Age: 32
End: 2022-10-10

## 2022-10-10 DIAGNOSIS — F31.70 BIPOLAR AFFECTIVE DISORDER IN REMISSION (H): ICD-10-CM

## 2022-10-10 RX ORDER — LAMOTRIGINE 200 MG/1
TABLET ORAL
Qty: 45 TABLET | Refills: 0 | Status: CANCELLED | OUTPATIENT
Start: 2022-10-10

## 2022-10-10 RX ORDER — LAMOTRIGINE 200 MG/1
TABLET ORAL
Qty: 45 TABLET | Refills: 0 | Status: SHIPPED | OUTPATIENT
Start: 2022-10-10 | End: 2022-10-18

## 2022-10-10 NOTE — TELEPHONE ENCOUNTER
Last Seen 6/7/22  RTC 1 month  Cancel 1   No-Show 0    Next Appt 10/18/22    Incoming Refill From patient request via MyChart    Medication Requested   lamoTRIgine (LAMICTAL) 200 MG tablet    Directions   TAKE 1 AND 1/2 TABLETS BY MOUTH DAILY ALONG WITH A 25MG TABLET FOR TOTAL OF 325MG DAILY    Qty 45    Last Refill 9/15/22    Medication Refill Pended Per Refill Protocol

## 2022-10-10 NOTE — TELEPHONE ENCOUNTER
Refill was routed to provider in a separate encounter. No further action needed. Lamont Pagan, EMT

## 2022-10-11 ENCOUNTER — MYC MEDICAL ADVICE (OUTPATIENT)
Dept: PSYCHIATRY | Facility: CLINIC | Age: 32
End: 2022-10-11

## 2022-10-18 ENCOUNTER — VIRTUAL VISIT (OUTPATIENT)
Dept: PSYCHIATRY | Facility: CLINIC | Age: 32
End: 2022-10-18
Attending: NURSE PRACTITIONER
Payer: COMMERCIAL

## 2022-10-18 DIAGNOSIS — F31.70 BIPOLAR AFFECTIVE DISORDER IN REMISSION (H): Primary | ICD-10-CM

## 2022-10-18 DIAGNOSIS — F41.0 PANIC ATTACKS: ICD-10-CM

## 2022-10-18 PROCEDURE — 99213 OFFICE O/P EST LOW 20 MIN: CPT | Mod: 95 | Performed by: NURSE PRACTITIONER

## 2022-10-18 RX ORDER — GABAPENTIN 600 MG/1
600 TABLET ORAL 3 TIMES DAILY PRN
Qty: 270 TABLET | Refills: 1 | Status: SHIPPED | OUTPATIENT
Start: 2022-10-18 | End: 2023-06-15

## 2022-10-18 RX ORDER — VENLAFAXINE HYDROCHLORIDE 150 MG/1
300 CAPSULE, EXTENDED RELEASE ORAL DAILY
Qty: 180 CAPSULE | Refills: 1 | Status: SHIPPED | OUTPATIENT
Start: 2022-10-18 | End: 2023-04-19

## 2022-10-18 RX ORDER — LAMOTRIGINE 200 MG/1
TABLET ORAL
Qty: 135 TABLET | Refills: 1 | Status: SHIPPED | OUTPATIENT
Start: 2022-10-18 | End: 2023-04-11

## 2022-10-18 RX ORDER — LAMOTRIGINE 25 MG/1
TABLET ORAL
Qty: 90 TABLET | Refills: 1 | Status: SHIPPED | OUTPATIENT
Start: 2022-10-18 | End: 2023-04-19

## 2022-10-18 ASSESSMENT — PATIENT HEALTH QUESTIONNAIRE - PHQ9
SUM OF ALL RESPONSES TO PHQ QUESTIONS 1-9: 7
10. IF YOU CHECKED OFF ANY PROBLEMS, HOW DIFFICULT HAVE THESE PROBLEMS MADE IT FOR YOU TO DO YOUR WORK, TAKE CARE OF THINGS AT HOME, OR GET ALONG WITH OTHER PEOPLE: SOMEWHAT DIFFICULT
SUM OF ALL RESPONSES TO PHQ QUESTIONS 1-9: 7

## 2022-10-18 NOTE — PROGRESS NOTES
VIDEO VISIT  Parveen Kaplan is a 32 year old who is being evaluated via a billable video visit.      Telehealth Details  Type of service:  medication management  Time of service:    Start Time:  1530     End Time:  1553    Reason for Telehealth Visit: Patient has requested telehealth visit and Services only offered telehealth  Originating Site (patient location):  [facility]   Location- Patient's home  Distant Site (provider location):  Remote location  Mode of Communication:  [facility]      Psychiatry Clinic Progress Note                                                                  Patient Name: Parveen Kaplan  YOB: 1990  MRN: [id number]  Date of Service:  10/18/2022  Last Seen:6/7/2022    Parveen Kaplan is a 32 year old person assigned female at birth, identifies as male who uses the name Omar and pronoun kareen.       Omar Kaplan is a 32 year old year old adult who is being evaluated via a billable telepsychiatry visit for ongoing psychiatric care.      Omar Kaplan was last seen on 6/7/2022.    At that time,     Medication Ordered/Consults/Labs/tests Ordered:      Medication:   -Increase Lamotrigine to 325 mg daily for mood.  -Gabapentin is changed to 600 mg up to 3 times a day as needed for anxiety and sleep.  -Continue all other medication regimen for now.  Pls check your blood pressure x2-3/week for next 2 weeks and send it to [facility] via [employer].  If this is normal, will consider starting stimulant for ADHD.  OTC Recommendations: none  Lab Orders:  none  Referrals: none  Release of Information: none  Future Treatment Considerations: Per symptoms.   Return for Follow Up: in 1 month    Pertinent Background:  Diagnoses include bipolar disorder, PTSD and AKIN.  Psych critical item history includes suicidal ideation, SIB [burning during HS], trauma hx and psych hosp (<3).      Previous medication trial: Buspar, Ativan, Clonidine (tremor), Seroquel (oversedation at 25mg, nausea, dizziness), Gabapentin  "(higher than 300 mg TID oversedation), Paxil (increased SI), Prazosin (oversedation), Remeron (not effective)     [All pronouns should read as \"he\"]     Hormone Care: Mary Bhakta, PN    Interim History                                                                                                        4, 4     Since the last visit,  -Had been doing well, mood and anxiety are well managed, denies SI, SIB or HI. Felt Lamictal increase really helped to stabilize mood.  -Has not used PRN Hydroxyzine as anxiety is well managed.  -Taking Gabapentin 600 mg AM mostly only.  -since medical cannabis started, sleeping very well, 7-8 hours/night, well rested.  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  If mutually agreed, this will become regular position.  So far, this is going well.    -Taking 2 classes this semester.  Feels easy to do.  -Not interested in changing any medications as he feels really well at this time. But in the future, may consider trial of non stimulant for ADHD as he feels stimulant would cause significant anxiety for him.  -But ok to discontinue Hydroxyzine as he has not been taking the medication.    Denies any symptoms suggestive of hypomania or psychosis.    Current Suicidality/Hx of Suicide Attempts: Denies both  CoCominent Medical concerns:Denies    Medication Side Effects: The patient denies all medication side effects.      Medical Review of Systems     Apart from the symptoms mentioned int he HPI, the 14 point review of systems, including constitutional, HEENT, cardiovascular, respiratory, gastrointestinal, genitourinary, musculoskeletal, integumentary, endocrine, neurological, hematologic and allergic is entirely negative.    Pregnant: None. Nursing: None, Contraception: partner not producing sperm    Substance Use     Denies frequent or abuse of alcohol. Social drinking rarely, when he drinks less than 2-3 drinks. Denies any other substance " "use.      Medical cannabis use daily.    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  Mostly virtual work.    Allergy                                Imitrex [sumatriptan]    Current Medications                                                                                                       Current Outpatient Medications   Medication Sig Dispense Refill     albuterol (PROAIR HFA/PROVENTIL HFA/VENTOLIN HFA) 108 (90 Base) MCG/ACT inhaler Inhale 2 puffs into the lungs every 6 hours 1 Inhaler 0     benzoyl peroxide 5 % external liquid Apply topically daily 226 g 11     CANNABIDIOL PO        clindamycin (CLEOCIN T) 1 % external lotion Apply topically 2 times daily 60 mL 11     gabapentin (NEURONTIN) 600 MG tablet Take 1 tablet (600 mg) by mouth 3 times daily as needed (anxiety and sleep) 270 tablet 0     hydrOXYzine (ATARAX) 25 MG tablet Take 1 tablet (25 mg) by mouth every 8 hours as needed for anxiety 30 tablet 1     lamoTRIgine (LAMICTAL) 200 MG tablet TAKE 1 1/2 TABLETS BY MOUTH DAILY ALONG WITH 25 MG TABLET FOR TOTAL  MG DAILY. 45 tablet 0     lamoTRIgine (LAMICTAL) 25 MG tablet TAKE 1 TABLET BY MOUTH DAILY. TOGETHER WITH 1.5 TABLET OF 200MG TO MAKE TOTAL  MG DAILY 30 tablet 0     naloxone (NARCAN) 4 MG/0.1ML nasal spray Spray 1 spray (4 mg) into one nostril alternating nostrils as needed for opioid reversal every 2-3 minutes until assistance arrives 0.2 mL 11     ondansetron (ZOFRAN) 4 MG tablet Take 1 tablet (4 mg) by mouth every 8 hours as needed for nausea 12 tablet 0     syringe/needle, disp, 25G X 1\" 1 ML MISC To use with weekly IM injection 10 each 3     testosterone (TESTOPEL) 75 MG " "subcutaneous implant pellet 825 mg by Subdermal route every 30 days       tretinoin (RETIN-A) 0.025 % external cream Apply topically At Bedtime Pea-sized amount to whole face 45 g 3     venlafaxine (EFFEXOR XR) 150 MG 24 hr capsule Take 2 capsules (300 mg) by mouth daily 180 capsule 1         Vitals                                                                                                                       3, 3   There were no vitals taken for this visit.        Mental Status Exam                                                                                   9, 14 cog        Alertness: alert  and oriented  Appearance:  Casually dressed and Adequately groomed  Behavior/Demeanor: cooperative, pleasant and calm, with good  eye contact   Speech: regular rate and rhythm  Mood :  \"really good\"  Affect: euthymic; was congruent to mood; was congruent to content  Thought Process (Associations):  Logical, Linear and Goal directed  Thought process (Rate):  Normal  Thought content:  no overt psychosis, denies suicidal ideation, intent or thoughts and patient does not appear to be responding to internal stimuli  Perception:  Reports none;  Denies auditory hallucinations and visual hallucinations  Attention/Concentration:  Normal  Memory:  Immediate recall intact and Short-term memory intact  Language: intact  Fund of Knowledge/Intelligence:  Average  Abstraction:  Normal  Insight:  Good  Judgment:  Good  Cognition: (6) does  appear grossly intact; formal cognitive testing was not done    Physical Exam     Motor activity/EPS:  Normal  Psychomotor: normal or unremarkable    Labs and Results      Pertinent findings on review include: Review of records with relevant information reported in the HPI.  Reviewed pt's past medical record and obtained collateral information.      [place] PRESCRIPTION MONITORING PROGRAM [] was checked today:  indicates Gabapentin 7/8,6/7.    Answers for HPI/ROS submitted by the patient on " 10/18/2022  If you checked off any problems, how difficult have these problems made it for you to do your work, take care of things at home, or get along with other people?: Somewhat difficult  PHQ9 TOTAL SCORE: 7    PHQ 2/17/2021 6/7/2022 8/29/2022   PHQ-9 Total Score 5 9 11   Q9: Thoughts of better off dead/self-harm past 2 weeks Not at all Not at all Not at all       AKIN 7 Today: N/A  AKIN-7 SCORE 7/8/2020 2/3/2021 6/7/2022   Total Score - 7 (mild anxiety) 12 (moderate anxiety)   Total Score 13 7 12   Total Score - - -       Recent Labs   Lab Test 04/22/22  1322 04/12/21  1329   CR 1.06 1.10*   GFRESTIMATED >90 68     Recent Labs   Lab Test 04/22/22  1322 05/10/21  1307 04/12/21  1329   AST 31 36 45   ALT 55* 92* 107*   ALKPHOS 80  --  102     3/31/2021: ALT 89,   6/8/2020: ALT 82,   6/1/2020: ALT 81,   11/19/2020 ALT 63,   1/13/2019: ALT 22     Vitamin D 24 (4/12/2021)     PSYCHOTROPIC DRUG INTERACTIONS:    Hydroxyzine + Effexor increases risk of QTc prolongation     MANAGEMENT:  Monitoring for adverse effects, pt aware of risks, routine EKG, minimum use of hydroxyzine      Impression/Assessment      Omar Kaplan is a 32 year old adult  who presents for med management follow up.  Pt appears stable in his mood and anxiety, denies SI, SIB or HI during the appointment. Pt noted increase in Lamictal significantly helped his mood.  Also medical cannabis provides restful sleep.  Anxiety is well managed with Gabapentin 600 mg AM only most of the days without any PRN Hydroxyzine.  Pt is ok to discontinue Hydroxyzine as he has not needed to use this and if needed, may use Gabapentin.  Pt wanted to discuss ADHD in last visit, but since doing very well, does not want to change any other medication at this time. Will continue all other medication regimen except discontinuing Hydroxyzine.    Diagnosis                                                                    Bipolar 2 disorder  PTSD  AKIN  Hx of ADHD    Treatment  Recommendation & Plan       Medication Ordered/Consults/Labs/tests Ordered:     Medication:   -Hydroxyzine is discontinued as you are not using the medication.  -Continue all other medication regimen.  OTC Recommendations: none  Lab Orders:  none  Referrals: none  Release of Information: none  Future Treatment Considerations: Per symptoms.   Return for Follow Up: in 6 months    -Discussed safety plan for suicidal thoughts  -Discussed plan for suicidality  -Discussed available emergency services  -Patient agrees with the treatment plan  -Encouraged to continue outpatient therapy to gain more coping mechanism for stress.    Treatment Risk Statement: Discussed with the patient my impressions, as well as recommended studies. I educated patient on the differential diagnosis and prognosis. I discussed with the patient the risks and benefits of medications versus no interventions, including efficacy, dose, possible side effects and length of treatment and the importance of medication compliance.  The patient understands the risks, benefits, adverse effects and alternatives. Agrees to treatment with the capacity to do so. No medical contraindications to treatment. The patient also understands the risks of using street drugs or alcohol.    CRISIS NUMBERS:   Provided routinely in S.    Diagnosis or treatment significantly limited by social determinants of health.        Dayan Blackmon, KOURTNEY,  10/18/2022

## 2022-10-18 NOTE — PATIENT INSTRUCTIONS
-Hydroxyzine is discontinued as you are not using the medication.  -Continue all other medication regimen.    Your next appointment is scheduled on 4/19/2023 (Wed) at 3:30pm.      **For crisis resources, please see the information at the end of this document**   Patient Education    Thank you for coming to the [facility] [place] [facility].     Lab Testing:  If you had lab testing today and your results are reassuring or normal they will be mailed to you or sent through [employer] within 7 days. If the lab tests need quick action we will call you with the results. The phone number we will call with results is # [phone]. If this is not the best number please call our clinic and change the number.     Medication Refills:  If you need any refills please call your pharmacy and they will contact us. Our fax number for refills is [phone].   Three business days of notice are needed for general medication refill requests.   Five business days of notice are needed for controlled substance refill requests.   If you need to change to a different pharmacy, please contact the new pharmacy directly. The new pharmacy will help you get your medications transferred.     Contact Us:  Please call [phone] during business hours (8-5:00 M-F).   If you have medication related questions after clinic hours, or on the weekend, please call [phone].     Financial Assistance [phone]   Medical Records [phone]       MENTAL HEALTH CRISIS RESOURCES:  For a emergency help, please call 911 or go to the nearest Emergency Department.     Emergency Walk-In Options:   EmPATH Unit @ [facility] ([place]): [phone] - Specialized mental health emergency area designed to be calming  [facility] ([place]): [phone]  [facility] ([place]): [phone]  [facility]): [phone]    [facility] Crisis Information:   [place]:  [phone]  [place]: [phone]  [place] (COP[place]) - Adult: [phone]     Child: [phone]  [place] - Adult: [phone]     Child: [phone]  [place]: [phone]  List of all [facility] resources:   https://[place].gov/dhs/people-we-serve/adults/health-care/mental-health/resources/crisis-contacts.jsp    National Crisis Information:   Crisis Text Line: Text  [place]  to 954614  Suicide & Crisis Lifeline: 988  National Suicide Prevention Lifeline: 4-867-485-TALK (1-979.863.2972)       For online chat options, visit https://suicidepreventionlifeline.org/chat/  Poison Control Center: 3-960-884-2507  Trans Lifeline: 1-925.716.4244 - Hotline for transgender people of all ages  [employer]: 0-716-756-0236 - Hotline for LGBT youth     For Non-Emergency Support:   Fast Tracker: Mental Health & Substance Use Disorder Resources -   https://www.[employer].org/

## 2022-10-18 NOTE — PROGRESS NOTES
Parveen Kaplan is a 32 year old who is being evaluated via a billable video visit.      Pt will join video visit via: [employer]  If there are problems joining the visit, send backup video invite via: Send to preferred e-mail: [email or web address]    Reason for telehealth visit: Patient has requested telehealth visit    Originating location (patient location): Patient's home    Will anyone else be joining the visit? No      Answers for HPI/ROS submitted by the patient on 10/18/2022  If you checked off any problems, how difficult have these problems made it for you to do your work, take care of things at home, or get along with other people?: Somewhat difficult  PHQ9 TOTAL SCORE: 7

## 2023-02-25 ENCOUNTER — MYC MEDICAL ADVICE (OUTPATIENT)
Dept: FAMILY MEDICINE | Facility: CLINIC | Age: 33
End: 2023-02-25
Payer: COMMERCIAL

## 2023-02-28 ENCOUNTER — VIRTUAL VISIT (OUTPATIENT)
Dept: URGENT CARE | Facility: CLINIC | Age: 33
End: 2023-02-28
Payer: COMMERCIAL

## 2023-02-28 ENCOUNTER — NURSE TRIAGE (OUTPATIENT)
Dept: FAMILY MEDICINE | Facility: CLINIC | Age: 33
End: 2023-02-28

## 2023-02-28 DIAGNOSIS — U07.1 COVID-19: Primary | ICD-10-CM

## 2023-02-28 PROCEDURE — 99213 OFFICE O/P EST LOW 20 MIN: CPT | Mod: CS | Performed by: NURSE PRACTITIONER

## 2023-02-28 NOTE — PROGRESS NOTES
Omar is a 32 year old who is being evaluated via a billable video visit.      How would you like to obtain your AVS? MyChart  If the video visit is dropped, the invitation should be resent by: Text to cell phone: [phone]  Will anyone else be joining your video visit? No        Assessment & Plan     (U07.1) COVID-19  (primary encounter diagnosis)    Plan: nirmatrelvir and ritonavir (PAXLOVID) therapy         pack  Normal gfr  Monitor cannabidiol and lamictal    LUISA Chapa CNP  Virtual Urgent Care  [facility] VIRTUAL URGENT CARE    Subjective   Omar is a 32 year old  presenting for the following health issues:  COVID TX    HPI     Dx COVID Saturday  Sx started X 5 days ago and consist of cough sore throat aches fever headache  No sob wheezing chest pain  Hydrated  Taking nyquil and mucinex    Objective           Vitals:  No vitals were obtained today due to virtual visit.    Physical Exam   GENERAL: Healthy, alert and no distress  EYES: Eyes grossly normal to inspection.  No discharge or erythema, or obvious scleral/conjunctival abnormalities.  RESP: No audible wheeze, cough, or visible cyanosis.  No visible retractions or increased work of breathing.    SKIN: Visible skin clear. No significant rash, abnormal pigmentation or lesions.  NEURO: Cranial nerves grossly intact.  Mentation and speech appropriate for age.  PSYCH: Mentation appears normal, affect normal/bright, judgement and insight intact, normal speech and appearance well-groomed.      Video-Visit Details    Type of service:  Video Visit   Video Start Time: 3:28 PM  Video End Time:3:48 PM  Originating Location (pt. Location): Home    Distant Location (provider location):  Off-site  Platform used for Video Visit: [employer]

## 2023-02-28 NOTE — TELEPHONE ENCOUNTER
"Pt contacted per Mobi message for COVID+.   Pt was triaged and dispo'd to discuss with PCP and callback by nurse with 1 hour.   Last visit with primary care provider > 2yrs ago: 08/11/202 VV with Dr. Blanc.    Pt scheduled for virtual visit with  provider to discuss risk factors/treatment options (pt does not have condition listed on his chart, but verbalizing he has a chronic health condition) :  -\"has to do with lungs/liver and a protein isn't produced\" Writer suggested Alpha-1-Antitrypsin deficiency; pt thinks its possible this could be what they are diagnosed with for a chronic health condition.   -ALT lab is elevated    Miles is interested in taking Paxlovid for +COVID.     1. COVID-19 DIAGNOSIS: \"Who made your COVID-19 diagnosis?\" \"Was it confirmed by a positive lab test or self-test?\" If not diagnosed by a doctor (or NP/PA), ask \"Are there lots of cases (community spread) where you live?\" Note: See public health department website, if unsure.      COVID - sat 2/26  2. COVID-19 EXPOSURE: \"Was there any known exposure to COVID before the symptoms began?\" CDC Definition of close contact: within 6 feet (2 meters) for a total of 15 minutes or more over a 24-hour period.       Friends dad is staying with him, he never tested.   3. ONSET: \"When did the COVID-19 symptoms start?\"       1/23/23  4. WORST SYMPTOM: \"What is your worst symptom?\" (e.g., cough, fever, shortness of breath, muscle aches)      Chest congestion  5. COUGH: \"Do you have a cough?\" If Yes, ask: \"How bad is the cough?\"        Yes, moderate; strength   6. FEVER: \"Do you have a fever?\" If Yes, ask: \"What is your temperature, how was it measured, and when did it start?\"      Denies, hx yesterday max temp 102 this past Sunday, oral thermometer   7. RESPIRATORY STATUS: \"Describe your breathing?\" (e.g., shortness of breath, wheezing, unable to speak)       \"rattely\" chest pain with coughing   8. BETTER-SAME-WORSE: \"Are you getting better, staying " "the same or getting worse compared to yesterday?\"  If getting worse, ask, \"In what way?\"      better  9. HIGH RISK DISEASE: \"Do you have any chronic medical problems?\" (e.g., asthma, heart or lung disease, weak immune system, obesity, etc.)      Alpha antitrypsn 1 defeciancy   10. VACCINE: \"Have you had the COVID-19 vaccine?\" If Yes, ask: \"Which one, how many shots, when did you get it?\"        moderna  11. BOOSTER: \"Have you received your COVID-19 booster?\" If Yes, ask: \"Which one and when did you get it?\"        moderan  12. PREGNANCY: \"Is there any chance you are pregnant?\" \"When was your last menstrual period?\"           13. OTHER SYMPTOMS: \"Do you have any other symptoms?\"  (e.g., chills, fatigue, headache, loss of smell or taste, muscle pain, sore throat)        Sore throat, fatigue. Congestion.   14. O2 SATURATION MONITOR:  \"Do you use an oxygen saturation monitor (pulse oximeter) at home?\" If Yes, ask \"What is your reading (oxygen level) today?\" \"What is your usual oxygen saturation reading?\" (e.g., 95%)        Pulse ox home.     Reason for Disposition    [1] HIGH RISK for severe COVID complications (e.g., weak immune system, age > 64 years, obesity with BMI of 30 or higher, pregnant, chronic lung disease or other chronic medical condition) AND [2] COVID symptoms (e.g., cough, fever)  (Exceptions: Already seen by PCP and no new or worsening symptoms.)    Additional Information    Negative: SEVERE difficulty breathing (e.g., struggling for each breath, speaks in single words)    Negative: Difficult to awaken or acting confused (e.g., disoriented, slurred speech)    Negative: Bluish (or gray) lips or face now    Negative: Shock suspected (e.g., cold/pale/clammy skin, too weak to stand, low BP, rapid pulse)    Negative: Sounds like a life-threatening emergency to the triager    Negative: [1] Diagnosed or suspected COVID-19 AND [2] symptoms lasting 3 or more weeks    Negative: [1] COVID-19 exposure AND [2] no " symptoms    Negative: COVID-19 vaccine reaction suspected (e.g., fever, headache, muscle aches) occurring 1 to 3 days after getting vaccine    Negative: COVID-19 vaccine, questions about    Negative: [1] Lives with someone known to have influenza (flu test positive) AND [2] flu-like symptoms (e.g., cough, runny nose, sore throat, SOB; with or without fever)    Negative: [1] Adult with possible COVID-19 symptoms AND [2] triager concerned about severity of symptoms or other causes    Negative: COVID-19 and breastfeeding, questions about    Negative: SEVERE or constant chest pain or pressure  (Exception: Mild central chest pain, present only when coughing.)    Negative: MODERATE difficulty breathing (e.g., speaks in phrases, SOB even at rest, pulse 100-120)    Negative: Headache and stiff neck (can't touch chin to chest)    Negative: Oxygen level (e.g., pulse oximetry) 90 percent or lower    Negative: Chest pain or pressure  (Exception: MILD central chest pain, present only when coughing)    Negative: Patient sounds very sick or weak to the triager    Negative: MILD difficulty breathing (e.g., minimal/no SOB at rest, SOB with walking, pulse <100)    Negative: Fever > 103 F (39.4 C)    Negative: [1] Fever > 101 F (38.3 C) AND [2] over 60 years of age    Negative: [1] Fever > 100.0 F (37.8 C) AND [2] bedridden (e.g., nursing home patient, CVA, chronic illness, recovering from surgery)    Protocols used: CORONAVIRUS (COVID-19) DIAGNOSED OR [id number]    RN COVID TREATMENT VISIT  02/28/23      The patient has been triaged and does not require a higher level of care.    Parveen Kaplan  32 year old  Current weight? 205 lbs    Has the patient been seen by a primary care provider at an [facility] or [facility] within the past two years? No, therefore patient is not eligible for COVID treatment standing order. Patient informed a virtual visit with a provider will be required for treatment.  Patient will be scheduled or transferred to a  at the end of this call.   Louise Estrada RN

## 2023-04-11 DIAGNOSIS — F31.70 BIPOLAR AFFECTIVE DISORDER IN REMISSION (H): ICD-10-CM

## 2023-04-11 RX ORDER — LAMOTRIGINE 200 MG/1
TABLET ORAL
Qty: 135 TABLET | Refills: 0 | Status: SHIPPED | OUTPATIENT
Start: 2023-04-11 | End: 2023-06-15

## 2023-04-11 NOTE — TELEPHONE ENCOUNTER
Last Seen 10/18/22  RTC 6 months  Cancel 0  No-Show 0    Next Appt 4/19/23    Incoming Refill From [facility] via fax    Medication Requested   lamoTRIgine (LAMICTAL) 200 MG tablet    Directions   TAKE 1 1/2 TABLETS BY MOUTH DAILY ALONG WITH 25 MG TABLET FOR TOTAL  MG DAILY.135    Qty 135    Last Refill 1/15/23    Medication Refill Pended Per Refill Protocol

## 2023-04-18 ASSESSMENT — PATIENT HEALTH QUESTIONNAIRE - PHQ9
SUM OF ALL RESPONSES TO PHQ QUESTIONS 1-9: 6
10. IF YOU CHECKED OFF ANY PROBLEMS, HOW DIFFICULT HAVE THESE PROBLEMS MADE IT FOR YOU TO DO YOUR WORK, TAKE CARE OF THINGS AT HOME, OR GET ALONG WITH OTHER PEOPLE: SOMEWHAT DIFFICULT
SUM OF ALL RESPONSES TO PHQ QUESTIONS 1-9: 6

## 2023-04-19 ENCOUNTER — VIRTUAL VISIT (OUTPATIENT)
Dept: PSYCHIATRY | Facility: CLINIC | Age: 33
End: 2023-04-19
Attending: NURSE PRACTITIONER
Payer: COMMERCIAL

## 2023-04-19 DIAGNOSIS — F31.70 BIPOLAR AFFECTIVE DISORDER IN REMISSION (H): ICD-10-CM

## 2023-04-19 DIAGNOSIS — F41.0 PANIC ATTACKS: ICD-10-CM

## 2023-04-19 DIAGNOSIS — F90.2 ATTENTION DEFICIT HYPERACTIVITY DISORDER (ADHD), COMBINED TYPE: Primary | ICD-10-CM

## 2023-04-19 PROCEDURE — 99214 OFFICE O/P EST MOD 30 MIN: CPT | Mod: VID | Performed by: NURSE PRACTITIONER

## 2023-04-19 RX ORDER — VENLAFAXINE HYDROCHLORIDE 150 MG/1
300 CAPSULE, EXTENDED RELEASE ORAL DAILY
Qty: 180 CAPSULE | Refills: 1 | Status: SHIPPED | OUTPATIENT
Start: 2023-04-19 | End: 2023-08-16

## 2023-04-19 RX ORDER — GUANFACINE 1 MG/1
1 TABLET ORAL AT BEDTIME
Qty: 30 TABLET | Refills: 1 | Status: SHIPPED | OUTPATIENT
Start: 2023-04-19 | End: 2023-05-10 | Stop reason: SINTOL

## 2023-04-19 RX ORDER — LAMOTRIGINE 25 MG/1
TABLET ORAL
Qty: 90 TABLET | Refills: 1 | Status: SHIPPED | OUTPATIENT
Start: 2023-04-19 | End: 2023-08-16

## 2023-04-19 NOTE — NURSING NOTE
Is the patient currently in the state of [place]? YES    Visit mode:VIDEO    If the visit is dropped, the patient can be reconnected by: VIDEO VISIT: Text to cell phone: [phone]    Will anyone else be joining the visit? NO      How would you like to obtain your AVS? MyChart    Are changes needed to the allergy or medication list? NO    Reason for visit: Video Visit

## 2023-04-19 NOTE — PROGRESS NOTES
"Virtual Visit Details    Type of service:  Video Visit   Video Start Time: 1530  Video End Time:1546    Originating Location (pt. Location): Home  Distant Location (provider location):  Off-site  Platform used for Video Visit: Unable to complete video visit as audio was not working, thus changed to phone only visit.      Psychiatry Clinic Progress Note                                                                  Patient Name: Parveen Kaplan  YOB: 1990  MRN: [id number]  Date of Service:  04/19/2023  Last Seen:10/18/2022    Parveen Kaplan is a 32 year old person assigned female at birth, identifies as male who uses the name Omar and pronoun kareen.       Omar Kaplan is a 32 year old year old adult who is being evaluated via a billable telepsychiatry visit for ongoing psychiatric care. Omar Kaplan was last seen on 10/18/2022.    At that time,     Medication Ordered/Consults/Labs/tests Ordered:     Medication:   -Hydroxyzine is discontinued as you are not using the medication.  -Continue all other medication regimen.  OTC Recommendations: none  Lab Orders:  none  Referrals: none  Release of Information: none  Future Treatment Considerations: Per symptoms.   Return for Follow Up: in 6 months    Pertinent Background:  Diagnoses include bipolar disorder, PTSD and AKIN.  Psych critical item history includes suicidal ideation, SIB [burning during HS], trauma hx and psych hosp (<3).      Previous medication trial: Buspar, Ativan, Clonidine (tremor), Seroquel (oversedation at 25mg, nausea, dizziness), Gabapentin (higher than 300 mg TID oversedation), Paxil (increased SI), Prazosin (oversedation), Remeron (not effective), Wellbutrin ('bad\")     [All pronouns should read as \"he\"]     Hormone Care: BALAJI Reed  Therapist: [facility] (every other week)    Interim History                                                                                                        4, 4     Since the last " visit,  -Most recent /71 on 4/19/2023.  -Has been doing OK.  Recently was hired to  at the place where he has been working. 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  -Taking 1 class this semester.  Noting he failed 2 classes last semester as it was overwhelming. This is going OK, missed some quizzes and in person visits due to snow and COVID.  Is not taking summer class due to financial reason, but already registered for a class in fall.  -Mood has been fluctuating to depressed to euthymic last couple weeks.  Lower mood occurring x1-2/week but not lasting.  Feels this is also situational with end of the semester with more work. Denies SI, SIB or HI.  -Sleeping mostly well, 7-8 hours/night.  Last couple nights, as he ran out of medical cannabis, had some nightmares, but restarting and sleeping better.  -Also considering to do trauma therapy, started to seeing a therapist at the begining of the year.  -Anxiety in general is mostly managed. Taking Gabapentin 600 mg in AM only.  -Wants to address inattention and hyperactivity of ADHD with non stimulant.  -Has a BP machine at home.    Denies any symptoms suggestive of hypomania or psychosis.    Current Suicidality/Hx of Suicide Attempts: Denies both  CoCominent Medical concerns:Denies    Medication Side Effects: The patient denies all medication side effects.      Medical Review of Systems     Apart from the symptoms mentioned int he HPI, the 14 point review of systems, including constitutional, HEENT, cardiovascular, respiratory, gastrointestinal, genitourinary, musculoskeletal, integumentary, endocrine, neurological, hematologic and allergic is entirely negative.    Pregnant: None. Nursing: None, Contraception: partner not producing sperm    Substance Use     Denies frequent or abuse of alcohol. Social drinking rarely, when he drinks less than 2-3 drinks. Denies any other substance use.      Medical cannabis use daily.    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  Mostly virtual work.    Allergy                                Imitrex [sumatriptan]    Current Medications                                                                                                       Current Outpatient Medications   Medication Sig Dispense Refill     albuterol (PROAIR HFA/PROVENTIL HFA/VENTOLIN HFA) 108 (90 Base) MCG/ACT inhaler Inhale 2 puffs into the lungs every 6 hours 1 Inhaler 0     benzoyl peroxide 5 % external liquid Apply topically daily 226 g 11     CANNABIDIOL PO        clindamycin (CLEOCIN T) 1 % external lotion Apply topically 2 times daily 60 mL 11     gabapentin (NEURONTIN) 600 MG tablet Take 1 tablet (600 mg) by mouth 3 times daily as needed (anxiety and sleep) 270 tablet 1     lamoTRIgine (LAMICTAL) 200 MG tablet TAKE 1 1/2 TABLETS BY MOUTH DAILY ALONG WITH 25 MG TABLET FOR TOTAL  MG DAILY. 135 tablet 0     lamoTRIgine (LAMICTAL) 25 MG tablet TAKE 1 TABLET BY MOUTH DAILY. TOGETHER WITH 1.5 TABLET OF 200MG TO MAKE TOTAL  MG DAILY 90 tablet 1     naloxone (NARCAN) 4 MG/0.1ML nasal spray Spray 1 spray (4 mg) into one nostril alternating nostrils as needed for opioid reversal every 2-3 minutes until assistance arrives 0.2 mL 11     ondansetron (ZOFRAN) 4 MG tablet Take 1 tablet (4 mg) by mouth every 8 hours as needed for nausea 12 tablet 0     syringe/needle, disp, 25G X 1\" 1 ML MISC To use with weekly IM injection 10 each 3     testosterone (TESTOPEL) 75 MG subcutaneous implant pellet 825 mg by Subdermal route every 30 days       tretinoin (RETIN-A) 0.025 % external cream Apply topically At Bedtime Pea-sized amount to whole face 45 g 3     " "venlafaxine (EFFEXOR XR) 150 MG 24 hr capsule Take 2 capsules (300 mg) by mouth daily 180 capsule 1         Vitals                                                                                                                       3, 3   There were no vitals taken for this visit.        Mental Status Exam                                                                                   9, 14 cog      Alertness: alert  and oriented  Behavior/Demeanor: cooperative, pleasant and calm  Speech: regular rate and rhythm  Mood :  \"fluctuates\"  Affect: mostly euthymic; was congruent to mood; was congruent to content  Thought Process (Associations):  Logical, Linear and Goal directed  Thought process (Rate):  Normal  Thought content:  no overt psychosis, denies suicidal ideation, intent or thoughts and patient does not appear to be responding to internal stimuli  Perception:  Reports none;  Denies auditory hallucinations and visual hallucinations  Attention/Concentration:  Normal  Memory:  Immediate recall intact and Short-term memory intact  Language: intact  Fund of Knowledge/Intelligence:  Average  Abstraction:  Normal  Insight:  Good  Judgment:  Good  Cognition: (6) does  appear grossly intact; formal cognitive testing was not done    Labs and Results      Pertinent findings on review include: Review of records with relevant information reported in the HPI.  Reviewed pt's past medical record and obtained collateral information.      [place] PRESCRIPTION MONITORING PROGRAM [] was checked today:  Gabapentin 3/6, 12/4.          8/29/2022    12:14 PM 10/18/2022     3:26 PM 4/18/2023     4:59 PM   PHQ   PHQ-9 Total Score 11 7 6   Q9: Thoughts of better off dead/self-harm past 2 weeks Not at all Not at all Not at all       AKIN 7 Today: N/A      7/8/2020     8:39 AM 2/3/2021     8:51 AM 6/7/2022     8:51 AM   AKIN-7 SCORE   Total Score  7 (mild anxiety) 12 (moderate anxiety)   Total Score 13 7 12       Recent Labs   Lab Test " 04/22/22  1322 04/12/21  1329   CR 1.06 1.10*   GFRESTIMATED >90 68     Recent Labs   Lab Test 04/22/22  1322 05/10/21  1307 04/12/21  1329   AST 31 36 45   ALT 55* 92* 107*   ALKPHOS 80  --  102     3/31/2021: ALT 89,   6/8/2020: ALT 82,   6/1/2020: ALT 81,   11/19/2020 ALT 63,   1/13/2019: ALT 22     Vitamin D 24 (4/12/2021)     PSYCHOTROPIC DRUG INTERACTIONS:    no     MANAGEMENT: N/A    Impression/Assessment      Omar Kaplan is a 32 year old adult  who presents for med management follow up.  Pt sounds mostly stable in his mood and anxiety, denies SI, SIB or HI during the appointment. Pt noted fluctuation of mood from depressed for few days/week and normal range.  Feels this is due to the end of the semester and does not feel need to adjust mood medication. Anxiety is continued to be well managed with Gabapentin 600 mg AM only most of the days. Pt however noted difficulties with ADHD sxs and wants to try non stimulant medication. Discussed different non stimulant for ADHD and pt decided to try Guanfacine while monitoring BP (if <90/60 or pulse <60/min to hold the medication).  Will start Guanfacine 1 mg HS while continuing all other medication regimen. But pt was instructed to monitor his mood carefully if depression exacerbates, may consider increase in Lamictal in the future.     Diagnosis                                                                   Bipolar 2 disorder  PTSD  AKIN  ADHD    Treatment Recommendation & Plan       Medication Ordered/Consults/Labs/tests Ordered:     Medication:   -Start Guanfacine 1 mg at bedtime for ADHD. Monitor blood pressure, if it is less than 90/60 or pulse is less than 60 per minute, hold the medication.  -Continue all other medication regimen.  OTC Recommendations: none  Lab Orders:  none  Referrals: none  Release of Information: none  Future Treatment Considerations: Per symptoms.   Return for Follow Up: in 5 weeks    -Discussed safety plan for suicidal  thoughts  -Discussed plan for suicidality  -Discussed available emergency services  -Patient agrees with the treatment plan  -Encouraged to continue outpatient therapy to gain more coping mechanism for stress.    Treatment Risk Statement: Discussed with the patient my impressions, as well as recommended studies. I educated patient on the differential diagnosis and prognosis. I discussed with the patient the risks and benefits of medications versus no interventions, including efficacy, dose, possible side effects and length of treatment and the importance of medication compliance.  The patient understands the risks, benefits, adverse effects and alternatives. Agrees to treatment with the capacity to do so. No medical contraindications to treatment. The patient also understands the risks of using street drugs or alcohol.    CRISIS NUMBERS:   Provided routinely in AVS.    Diagnosis or treatment significantly limited by social determinants of health.        Dayan Blackmon, KOURTNEY,  04/19/2023

## 2023-04-19 NOTE — PATIENT INSTRUCTIONS
-Start Guanfacine 1 mg at bedtime for ADHD. Monitor blood pressure, if it is less than 90/60 or pulse is less than 60 per minute, hold the medication.  -Continue all other medication regimen.    Your next appointment is scheduled on 5/24/2023 (Wed) at 1:30pm.    Thank you for coming to the [facility].    Lab Testing:  If you had lab testing today and your results are reassuring or normal they will be mailed to you or sent through [employer] within 7 days. If the lab tests need quick action we will call you with the results. The phone number we will call with results is # [phone] (home) . If this is not the best number please call our clinic and change the number.    Medication Refills:  If you need any refills please call your pharmacy and they will contact us. Our fax number for refills is [phone]. Please allow three business for refill processing. If you need to  your refill at a new pharmacy, please contact the new pharmacy directly. The new pharmacy will help you get your medications transferred.     Scheduling:  If you have any concerns about today's visit or wish to schedule another appointment please call our office during normal business hours [phone] (8-5:00 M-F)    Contact Us:  Please call [phone] during business hours (8-5:00 M-F).  If after clinic hours, or on the weekend, please call  [phone].    Financial Assistance [phone]  [facility] Billing [phone]  Central Billing Office, [facility]: [phone]  [place] Billing [phone]  Medical Records [phone]      MENTAL HEALTH CRISIS NUMBERS:  For a medical emergency please call  911 or go to the nearest ER.     [facility]:    [phone]   Crisis Residence [facility] Residence -[phone]   Walk-In Counseling Center [facility] -[phone]   COPE 24/7 [place] Mobile Team -[phone] (adults)/[phone] (child)  CHILD: [facility] needs assessment team - [phone]      [facility]:   [facility] - [phone]   Walk-in counseling [facility] - [phone]   Walk-in counseling [facility] - [phone]   Crisis Residence [facility] Residence - [phone]  Urgent Care Adult Mental Xnqphg-[phone] mobile unit/ 24/7 crisis line    National Crisis Numbers:   National Suicide Prevention Lifeline: 6-245-507-TALK (331-444-1609)  Poison Control Center - 8-589-012-2252  Cloud[employer]/resources for a list of additional resources (SOS)  Trans Lifeline a hotline for transgender people 3-868-406-1302  The [employer] a hotline for LGBT youth 9-422-442-5367  Crisis Text Line: For any crisis 24/7   To: 228462  see www.crisistextline.org  - IF MAKING A CALL FEELS TOO HARD, send a text!         Again thank you for choosing [facility] and please let us know how we can best partner with you to improve you and your family's health.    You may be receiving a survey regarding this appointment. We would love to have your feedback, both positive and negative. The survey is done by an external company, so your answers are anonymous.

## 2023-05-10 DIAGNOSIS — F90.2 ATTENTION DEFICIT HYPERACTIVITY DISORDER (ADHD), COMBINED TYPE: Primary | ICD-10-CM

## 2023-05-10 RX ORDER — ATOMOXETINE 10 MG/1
10 CAPSULE ORAL DAILY
Qty: 30 CAPSULE | Refills: 1 | Status: SHIPPED | OUTPATIENT
Start: 2023-05-10 | End: 2023-06-15

## 2023-05-24 ENCOUNTER — DOCUMENTATION ONLY (OUTPATIENT)
Dept: PSYCHIATRY | Facility: CLINIC | Age: 33
End: 2023-05-24

## 2023-05-27 ENCOUNTER — HEALTH MAINTENANCE LETTER (OUTPATIENT)
Age: 33
End: 2023-05-27

## 2023-06-15 ENCOUNTER — VIRTUAL VISIT (OUTPATIENT)
Dept: PSYCHIATRY | Facility: CLINIC | Age: 33
End: 2023-06-15
Attending: NURSE PRACTITIONER
Payer: COMMERCIAL

## 2023-06-15 DIAGNOSIS — F90.2 ATTENTION DEFICIT HYPERACTIVITY DISORDER (ADHD), COMBINED TYPE: Primary | ICD-10-CM

## 2023-06-15 DIAGNOSIS — F31.70 BIPOLAR AFFECTIVE DISORDER IN REMISSION (H): ICD-10-CM

## 2023-06-15 DIAGNOSIS — F41.0 PANIC ATTACKS: ICD-10-CM

## 2023-06-15 PROCEDURE — 99214 OFFICE O/P EST MOD 30 MIN: CPT | Mod: VID | Performed by: NURSE PRACTITIONER

## 2023-06-15 RX ORDER — LAMOTRIGINE 200 MG/1
TABLET ORAL
Qty: 135 TABLET | Refills: 0 | Status: SHIPPED | OUTPATIENT
Start: 2023-06-15 | End: 2023-08-16

## 2023-06-15 RX ORDER — GABAPENTIN 600 MG/1
600 TABLET ORAL 3 TIMES DAILY PRN
Qty: 270 TABLET | Refills: 1 | Status: SHIPPED | OUTPATIENT
Start: 2023-06-15 | End: 2023-12-28

## 2023-06-15 RX ORDER — ATOMOXETINE 10 MG/1
10 CAPSULE ORAL DAILY
Qty: 90 CAPSULE | Refills: 0 | Status: SHIPPED | OUTPATIENT
Start: 2023-06-15 | End: 2023-08-16

## 2023-06-15 ASSESSMENT — PATIENT HEALTH QUESTIONNAIRE - PHQ9
10. IF YOU CHECKED OFF ANY PROBLEMS, HOW DIFFICULT HAVE THESE PROBLEMS MADE IT FOR YOU TO DO YOUR WORK, TAKE CARE OF THINGS AT HOME, OR GET ALONG WITH OTHER PEOPLE: SOMEWHAT DIFFICULT
SUM OF ALL RESPONSES TO PHQ QUESTIONS 1-9: 4
SUM OF ALL RESPONSES TO PHQ QUESTIONS 1-9: 4

## 2023-06-15 NOTE — NURSING NOTE
Is the patient currently in the state of [place]? YES    Visit mode:VIDEO    If the visit is dropped, the patient can be reconnected by: VIDEO VISIT: Text to cell phone: [phone]    Will anyone else be joining the visit? NO      How would you like to obtain your AVS? MyChart    Are changes needed to the allergy or medication list? NO    Reason for visit: RECHECK           Harrym: Dr. Zeyad Hernandez

## 2023-06-15 NOTE — PROGRESS NOTES
"Virtual Visit Details    Type of service:  Video Visit   Video Start Time: 0930  Video End Time: 0957    Originating Location (pt. Location): Home  Distant Location (provider location):  Off-site  Platform used for Video Visit:[facility]    [facility] Progress Note                                                                  Patient Name: Parveen Kaplan  YOB: 1990  MRN: [id number]  Date of Service:  06/15/2023  Last Seen:4/19/2023    Parveen Kaplan is a 33 year old person assigned female at birth, identifies as male who uses the name Omar and pronoun kareen.       Omar Kaplan is a 33 year old year old adult who is being evaluated via a billable telepsychiatry visit for ongoing psychiatric care. Omar Kaplan was last seen on 4/19/2023.    At that time,     Medication Ordered/Consults/Labs/tests Ordered:     Medication:   -Start Guanfacine 1 mg at bedtime for ADHD. Monitor blood pressure, if it is less than 90/60 or pulse is less than 60 per minute, hold the medication.  -Continue all other medication regimen.  OTC Recommendations: none  Lab Orders:  none  Referrals: none  Release of Information: none  Future Treatment Considerations: Per symptoms.   Return for Follow Up: in 5 weeks    Pertinent Background:  Diagnoses include bipolar disorder, PTSD and AKIN.  Psych critical item history includes suicidal ideation, SIB [burning during HS], trauma hx and psych hosp (<3).      Previous medication trial: Buspar, Ativan, Clonidine (tremor), Seroquel (oversedation at 25mg, nausea, dizziness), Gabapentin (higher than 300 mg TID oversedation), Paxil (increased SI), Prazosin (oversedation), Remeron (not effective), Wellbutrin ('bad\"), Guanfacine (oversedation)     [All pronouns should read as \"he\"]     Hormone Care: BALAJI Reed  Therapist: [facility] (every other week)    Interim History                                                                                                        4, " 4     On 5/24/2023, pt was no show.    On 5/9/2023, pt sent a [employer] message noting Guanfacine is causing oversedation, falling asleep at the desk.  Pt wanted to switch Guanfacine to Strattera. Guanfacine discontinued and Strattera 10 mg daily started while monitoring for mood.    Since the last visit,  -Doing well.  Tolearting Strattera, feels concentration is optimally managed and no recurrence of oversedation since stopping Guanfacine.  -When started Strattera, there was few days of lack of need for sleep, but now sleeping 7 hours/night.  -Feels since able to concentrate better, need to pace himself as he wanted to do more things, but does not feel like this is hypomania, just never been able to focus this well and able to do more.  -Recent anxiety exacerbation due to financial stress. Received a letter for potential court, hired a  to apply bankruptcy. But this may mean that pt may not be able to continue school in fall.  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 Hoping to possibly work in [facility] as a clinical , but also interested in research.   -Taking Gabapentin 600 mg in AM and took 600 mg x2-3 since last seen random time and anxiety is manageable.  -Also took old Hydroxyzine x 1.  -Overall happy with current medication regimen and wants to continue.    Denies any symptoms suggestive of hypomania or psychosis.    Current Suicidality/Hx of Suicide Attempts: Denies both  CoCominent Medical concerns:Denies    Medication Side Effects: The patient denies all medication side effects.      Medical Review of Systems     Apart from the symptoms mentioned int he HPI, the 14 point review of systems, including constitutional, HEENT, cardiovascular, respiratory, gastrointestinal, genitourinary, musculoskeletal, integumentary, endocrine, neurological, hematologic and allergic is entirely negative.    Pregnant: None. Nursing: None, Contraception: partner not producing  sperm    Substance Use     Denies frequent or abuse of alcohol. Social drinking rarely, when he drinks less than 2-3 drinks. Denies any other substance use.      Medical cannabis use daily.    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  Mostly virtual work.    Allergy                                Imitrex [sumatriptan]    Current Medications                                                                                                       Current Outpatient Medications   Medication Sig Dispense Refill    albuterol (PROAIR HFA/PROVENTIL HFA/VENTOLIN HFA) 108 (90 Base) MCG/ACT inhaler Inhale 2 puffs into the lungs every 6 hours 1 Inhaler 0    atomoxetine (STRATTERA) 10 MG capsule Take 1 capsule (10 mg) by mouth daily 30 capsule 1    benzoyl peroxide 5 % external liquid Apply topically daily 226 g 11    CANNABIDIOL PO       clindamycin (CLEOCIN T) 1 % external lotion Apply topically 2 times daily 60 mL 11    gabapentin (NEURONTIN) 600 MG tablet Take 1 tablet (600 mg) by mouth 3 times daily as needed (anxiety and sleep) 270 tablet 1    lamoTRIgine (LAMICTAL) 200 MG tablet TAKE 1 1/2 TABLETS BY MOUTH DAILY ALONG WITH 25 MG TABLET FOR TOTAL  MG DAILY. 135 tablet 0    lamoTRIgine (LAMICTAL) 25 MG tablet TAKE 1 TABLET BY MOUTH DAILY. TOGETHER WITH 1.5 TABLET OF 200MG TO MAKE TOTAL  MG DAILY 90 tablet 1    naloxone (NARCAN) 4 MG/0.1ML nasal spray Spray 1 spray (4 mg) into one nostril alternating nostrils as needed for opioid reversal every 2-3 minutes until assistance arrives 0.2 mL 11    ondansetron (ZOFRAN) 4 MG tablet Take 1 tablet (4 mg) by mouth every 8 hours as needed for nausea 12 tablet 0    syringe/needle,  "disp, 25G X 1\" 1 ML MISC To use with weekly IM injection 10 each 3    testosterone (TESTOPEL) 75 MG subcutaneous implant pellet 825 mg by Subdermal route every 30 days      tretinoin (RETIN-A) 0.025 % external cream Apply topically At Bedtime Pea-sized amount to whole face 45 g 3    venlafaxine (EFFEXOR XR) 150 MG 24 hr capsule Take 2 capsules (300 mg) by mouth daily 180 capsule 1         Vitals                                                                                                                       3, 3   There were no vitals taken for this visit.        Mental Status Exam                                                                                   9, 14 cog      Alertness: alert  and oriented   Appearance: casually and adequately groomed  Behavior/Demeanor: cooperative, pleasant and calm with good eye contact  Speech: regular rate and rhythm  Mood :  \"pretty good\"  Affect:  euthymic ; was congruent to mood; was congruent to content  Thought Process (Associations):  Logical, Linear and Goal directed  Thought process (Rate):  Normal  Thought content:  no overt psychosis, denies suicidal ideation, intent or thoughts and patient does not appear to be responding to internal stimuli  Perception:  Reports none;  Denies auditory hallucinations and visual hallucinations  Attention/Concentration:  Normal  Memory:  Immediate recall intact and Short-term memory intact  Language: intact  Fund of Knowledge/Intelligence:  Average  Abstraction:  Normal  Insight:  Good  Judgment:  Good  Cognition: (6) does  appear grossly intact; formal cognitive testing was not done    Physical Exam     Motor activity/EPS:  Normal  Psychomotor: normal or unremarkable    Labs and Results      Pertinent findings on review include: Review of records with relevant information reported in the HPI.  Reviewed pt's past medical record and obtained collateral information.      [place] PRESCRIPTION MONITORING PROGRAM [] was checked today:  " Gabapentin 4/26.    Answers for HPI/ROS submitted by the patient on 6/15/2023  If you checked off any problems, how difficult have these problems made it for you to do your work, take care of things at home, or get along with other people?: Somewhat difficult  PHQ9 TOTAL SCORE: 4        8/29/2022    12:14 PM 10/18/2022     3:26 PM 4/18/2023     4:59 PM   PHQ   PHQ-9 Total Score 11 7 6   Q9: Thoughts of better off dead/self-harm past 2 weeks Not at all Not at all Not at all       AKIN 7 Today: N/A      7/8/2020     8:39 AM 2/3/2021     8:51 AM 6/7/2022     8:51 AM   AKIN-7 SCORE   Total Score  7 (mild anxiety) 12 (moderate anxiety)   Total Score 13 7 12       Recent Labs   Lab Test 04/22/22  1322 04/12/21  1329   CR 1.06 1.10*   GFRESTIMATED >90 68     Recent Labs   Lab Test 04/22/22  1322 05/10/21  1307 04/12/21  1329   AST 31 36 45   ALT 55* 92* 107*   ALKPHOS 80  --  102     3/31/2021: ALT 89,   6/8/2020: ALT 82,   6/1/2020: ALT 81,   11/19/2020 ALT 63,   1/13/2019: ALT 22     Vitamin D 24 (4/12/2021)     PSYCHOTROPIC DRUG INTERACTIONS:    no     MANAGEMENT: N/A    Impression/Assessment      Omar Kaplan is a 33 year old adult  who presents for med management follow up.  Pt appears stable in his mood and anxiety, denies SI, SIB or HI during the appointment. Pt noted significant improvement in concentration without any ADR with Strattera. Reports when Strattera started, had few days of lack of need for sleep, but this stabilizes and sleeping 7 hours/night now. Pt also noted need for pacing as he is able to concentrate for the first time to complete tasks, but does not think this is hypomania. Reports due to financial stress, recent anxiety exacerbation, but hired a  to manage this. Taking Gabapentin 600 mg in AM consistently and occasional 2nd dose that helps anxiety. Since doing well, pt wants to continue on current medication regimen. OK to continue on current medication regimen. Recommend to monitor BP x2  next 2 weeks and if consistently over 140/90 to follow up with PCP. Historically pt had low BP to WNL BP.    Diagnosis                                                                   Bipolar 2 disorder  PTSD  AKIN  ADHD    Treatment Recommendation & Plan       Medication Ordered/Consults/Labs/tests Ordered:     Medication: Continue on current medication regimen. Monitor your blood pressure 2 times a week in next 2 weeks and if this is consistently over 140/90, please follow up with primary care.  OTC Recommendations: none  Lab Orders:  none  Referrals: none  Release of Information: none  Future Treatment Considerations: Per symptoms.   Return for Follow Up: in 2 months     -Discussed safety plan for suicidal thoughts  -Discussed plan for suicidality  -Discussed available emergency services  -Patient agrees with the treatment plan  -Encouraged to continue outpatient therapy to gain more coping mechanism for stress.    Treatment Risk Statement: Discussed with the patient my impressions, as well as recommended studies. I educated patient on the differential diagnosis and prognosis. I discussed with the patient the risks and benefits of medications versus no interventions, including efficacy, dose, possible side effects and length of treatment and the importance of medication compliance.  The patient understands the risks, benefits, adverse effects and alternatives. Agrees to treatment with the capacity to do so. No medical contraindications to treatment. The patient also understands the risks of using street drugs or alcohol.    CRISIS NUMBERS:   Provided routinely in AVS.    Diagnosis or treatment significantly limited by social determinants of health.        Dayan Blackmon, KOURTNEY,  06/15/2023

## 2023-06-15 NOTE — PATIENT INSTRUCTIONS
-Continue on current medication regimen. Monitor your blood pressure 2 times a week in next 2 weeks and if this is consistently over 140/90, please follow up with primary care.    Your next appointment is scheduled on 8/16/2023 (Wed) at 9:30am.    Thank you for coming to the [facility] [place] [facility].    Lab Testing:  If you had lab testing today and your results are reassuring or normal they will be mailed to you or sent through [employer] within 7 days. If the lab tests need quick action we will call you with the results. The phone number we will call with results is # [phone] (home) . If this is not the best number please call our clinic and change the number.    Medication Refills:  If you need any refills please call your pharmacy and they will contact us. Our fax number for refills is [phone]. Please allow three business for refill processing. If you need to  your refill at a new pharmacy, please contact the new pharmacy directly. The new pharmacy will help you get your medications transferred.     Scheduling:  If you have any concerns about today's visit or wish to schedule another appointment please call our office during normal business hours [phone] (8-5:00 M-F)    Contact Us:  Please call [phone] during business hours (8-5:00 M-F).  If after clinic hours, or on the weekend, please call  [phone].    Financial Assistance [phone]  [facility] Billing [phone]  Central Billing Office, [facility]: [phone]  [place] Billing [phone]  Medical Records [phone]      MENTAL HEALTH CRISIS NUMBERS:  For a medical emergency please call  911 or go to the nearest ER.     [facility]:   [facility] -[phone]   Crisis Residence [facility] -[phone]   Walk-In Counseling Center [facility] -[phone]   COPE 24/7 [place] Mobile Team -[phone] (adults)/[phone] (child)  CHILD: [facility] needs  assessment team - [phone]      [facility]:   [facility] - [phone]   Walk-in counseling [facility] - [phone]   Walk-in counseling [facility] - [phone]   Crisis Residence [facility] Residence - [phone]  Urgent Care Adult Mental [facility]-[phone] mobile unit/ 24/7 crisis line    National Crisis Numbers:   National Suicide Prevention Lifeline: 5-738-371-TALK (384-517-4381)  Poison Control Center - 6-552-683-7514  [employer]/resources for a list of additional resources (SOS)  Trans Lifeline a hotline for transgender people 1-379-711-7522  The [employer] Project a hotline for LGBT youth 9-270-037-3792  Crisis Text Line: For any crisis 24/7   To: 867649  see www.crisistextline.org  - IF MAKING A CALL FEELS TOO HARD, send a text!         Again thank you for choosing [facility] MENTAL HEALTH & ADDICTION [facility] and please let us know how we can best partner with you to improve you and your family's health.    You may be receiving a survey regarding this appointment. We would love to have your feedback, both positive and negative. The survey is done by an external company, so your answers are anonymous.

## 2023-06-26 ENCOUNTER — TRANSFERRED RECORDS (OUTPATIENT)
Dept: HEALTH INFORMATION MANAGEMENT | Facility: CLINIC | Age: 33
End: 2023-06-26

## 2023-06-26 LAB
ALT SERPL-CCNC: 88 IU/L (ref 0–44)
AST SERPL-CCNC: 38 IU/L (ref 0–40)
CREATININE (EXTERNAL): 1.03 MG/DL (ref 0.76–1.27)
GFR ESTIMATED (EXTERNAL): 100 ML/MIN/1.73M2
GLUCOSE (EXTERNAL): 72 MG/DL (ref 70–99)
INR (EXTERNAL): 1 (ref 0.9–1.2)
POTASSIUM (EXTERNAL): 4.6 MMOL/L (ref 3.5–5.2)

## 2023-08-14 ENCOUNTER — TELEPHONE (OUTPATIENT)
Dept: PSYCHIATRY | Facility: CLINIC | Age: 33
End: 2023-08-14

## 2023-08-14 NOTE — TELEPHONE ENCOUNTER
[facility] Call Center    Phone Message    May a detailed message be left on voicemail: yes     Reason for Call: Other: Received 20 pages of medical records from [facility]. Sent to HIM for scanning.  Original copies kept at  until records are scanned into the chart.     Action Taken: Other: Sent to HIM    Travel Screening: Not Applicable

## 2023-08-16 ENCOUNTER — VIRTUAL VISIT (OUTPATIENT)
Dept: PSYCHIATRY | Facility: CLINIC | Age: 33
End: 2023-08-16
Attending: NURSE PRACTITIONER
Payer: COMMERCIAL

## 2023-08-16 DIAGNOSIS — F31.70 BIPOLAR AFFECTIVE DISORDER IN REMISSION (H): Primary | ICD-10-CM

## 2023-08-16 DIAGNOSIS — F90.2 ATTENTION DEFICIT HYPERACTIVITY DISORDER (ADHD), COMBINED TYPE: ICD-10-CM

## 2023-08-16 DIAGNOSIS — F41.0 PANIC ATTACKS: ICD-10-CM

## 2023-08-16 PROCEDURE — 99214 OFFICE O/P EST MOD 30 MIN: CPT | Mod: VID | Performed by: NURSE PRACTITIONER

## 2023-08-16 RX ORDER — ATOMOXETINE 10 MG/1
10 CAPSULE ORAL DAILY
Qty: 90 CAPSULE | Refills: 0 | Status: SHIPPED | OUTPATIENT
Start: 2023-08-16 | End: 2023-09-06

## 2023-08-16 RX ORDER — LAMOTRIGINE 200 MG/1
TABLET ORAL
Qty: 135 TABLET | Refills: 0 | Status: SHIPPED | OUTPATIENT
Start: 2023-08-16 | End: 2023-09-06

## 2023-08-16 RX ORDER — LAMOTRIGINE 25 MG/1
TABLET ORAL
Qty: 90 TABLET | Refills: 1 | Status: SHIPPED | OUTPATIENT
Start: 2023-08-16 | End: 2023-09-06

## 2023-08-16 RX ORDER — VENLAFAXINE HYDROCHLORIDE 75 MG/1
75 CAPSULE, EXTENDED RELEASE ORAL DAILY
Qty: 30 CAPSULE | Refills: 1 | Status: SHIPPED | OUTPATIENT
Start: 2023-08-16 | End: 2023-09-06

## 2023-08-16 RX ORDER — VENLAFAXINE HYDROCHLORIDE 150 MG/1
150 CAPSULE, EXTENDED RELEASE ORAL DAILY
Qty: 30 CAPSULE | Refills: 1 | Status: SHIPPED | OUTPATIENT
Start: 2023-08-16 | End: 2023-09-06
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
## 2023-09-06 ENCOUNTER — VIRTUAL VISIT (OUTPATIENT)
Dept: PSYCHIATRY | Facility: CLINIC | Age: 33
End: 2023-09-06
Attending: NURSE PRACTITIONER
Payer: COMMERCIAL

## 2023-09-06 DIAGNOSIS — F31.70 BIPOLAR AFFECTIVE DISORDER IN REMISSION (H): ICD-10-CM

## 2023-09-06 DIAGNOSIS — F90.2 ATTENTION DEFICIT HYPERACTIVITY DISORDER (ADHD), COMBINED TYPE: ICD-10-CM

## 2023-09-06 DIAGNOSIS — F43.10 PTSD (POST-TRAUMATIC STRESS DISORDER): ICD-10-CM

## 2023-09-06 DIAGNOSIS — F41.0 PANIC ATTACKS: ICD-10-CM

## 2023-09-06 DIAGNOSIS — F41.1 GAD (GENERALIZED ANXIETY DISORDER): Primary | ICD-10-CM

## 2023-09-06 PROCEDURE — 99214 OFFICE O/P EST MOD 30 MIN: CPT | Mod: VID | Performed by: NURSE PRACTITIONER

## 2023-09-06 RX ORDER — LAMOTRIGINE 200 MG/1
TABLET ORAL
Qty: 135 TABLET | Refills: 1 | Status: SHIPPED | OUTPATIENT
Start: 2023-09-06 | End: 2023-12-28

## 2023-09-06 RX ORDER — LAMOTRIGINE 25 MG/1
TABLET ORAL
Qty: 90 TABLET | Refills: 1 | Status: SHIPPED | OUTPATIENT
Start: 2023-09-06 | End: 2023-12-28

## 2023-09-06 RX ORDER — VENLAFAXINE HYDROCHLORIDE 75 MG/1
75 CAPSULE, EXTENDED RELEASE ORAL DAILY
Qty: 30 CAPSULE | Refills: 1 | Status: SHIPPED | OUTPATIENT
Start: 2023-09-06 | End: 2023-12-28

## 2023-09-06 RX ORDER — ATOMOXETINE 10 MG/1
10 CAPSULE ORAL DAILY
Qty: 90 CAPSULE | Refills: 0 | Status: SHIPPED | OUTPATIENT
Start: 2023-09-06 | End: 2023-10-09

## 2023-09-06 RX ORDER — VENLAFAXINE HYDROCHLORIDE 150 MG/1
150 CAPSULE, EXTENDED RELEASE ORAL DAILY
Qty: 30 CAPSULE | Refills: 1 | Status: SHIPPED | OUTPATIENT
Start: 2023-09-06 | End: 2023-12-28

## 2023-09-06 NOTE — PATIENT INSTRUCTIONS
-Continue on current medication regimen.      -Please let luis know if you want them to order liver function test.    Your next appointment is scheduled on 12/6/2023 (Wed) at 3:30pm.    Thank you for coming to the [facility] [place] [facility].    Lab Testing:  If you had lab testing today and your results are reassuring or normal they will be mailed to you or sent through [employer] within 7 days. If the lab tests need quick action we will call you with the results. The phone number we will call with results is # [phone] (home) . If this is not the best number please call our clinic and change the number.    Medication Refills:  If you need any refills please call your pharmacy and they will contact us. Our fax number for refills is [phone]. Please allow three business for refill processing. If you need to  your refill at a new pharmacy, please contact the new pharmacy directly. The new pharmacy will help you get your medications transferred.     Scheduling:  If you have any concerns about today's visit or wish to schedule another appointment please call our office during normal business hours [phone] (8-5:00 M-F)    Contact Us:  Please call [phone] during business hours (8-5:00 M-F).  If after clinic hours, or on the weekend, please call  [phone].    Financial Assistance [phone]  [facility] Billing [phone]  [facility] Billing Office, [facility]: [phone]  [facility] Billing [phone]  Medical Records [phone]      MENTAL HEALTH CRISIS NUMBERS:  For a medical emergency please call  911 or go to the nearest ER.     [facility]:   [facility] -[phone]   Crisis Residence [facility] -[phone]   Walk-In Counseling Center [facility] -[phone]   COPE 24/7 [facility] Mobile Team -[phone] (adults)/[phone] (child)  CHILD: [facility] needs assessment team - [phone]      [facility]:   [facility]  [facility] - [phone]   Walk-in counseling [facility] - [phone]   Walk-in counseling [facility] - [phone]   Crisis Residence [facility] - [phone]  Urgent Care Adult Mental [facility]-[phone] mobile unit/ 24/7 crisis line    National Crisis Numbers:   National Suicide Prevention Lifeline: 0-399-361-TALK (991-186-0751)  Poison Control Center - 6-922-760-0775  "[employer]/resources for a list of additional resources (SOS)  Trans Lifeline a hotline for transgender people 9-326-325-8006  The [employer] a hotline for LGBT youth 1-648.660.9055  Crisis Text Line: For any crisis 24/7   To: 101266  see www.crisistextline.org  - IF MAKING A CALL FEELS TOO HARD, send a text!         Again thank you for choosing [facility] and please let us know how we can best partner with you to improve you and your family's health.    You may be receiving a survey regarding this appointment. We would love to have your feedback, both positive and negative. The survey is done by an external company, so your answers are anonymous.

## 2023-09-06 NOTE — PROGRESS NOTES
"Virtual Visit Details    Type of service:  Video Visit     Originating Location (pt. Location): Home  Distant Location (provider location):  Off-site  Platform used for Video Visit: [facility]    Psychiatry Clinic Progress Note                                                                  Patient Name: Parveen Kaplan  YOB: 1990  MRN: [id number]  Date of Service:  09/06/2023  Last Seen: 8/16/2023    Parveen Kaplan is a 33 year old person assigned female at birth, identifies as male who uses the name Omar and pronoun he.       Omar Kaplan is a 33 year old year old adult who is being evaluated via a billable telepsychiatry visit for ongoing psychiatric care. Omar Kaplan was last seen on 8/16/2023.    At that time,     Medication Ordered/Consults/Labs/tests Ordered:     Medication:   -Decrease Effexor to 225 mg daily. Monitor anxiety and mood closely and if it's getting worse, please contact luis as soon as possible.  -Continue all other medication regimen.  OTC Recommendations: none  Lab Orders:  none  Referrals: none  Release of Information: none  Future Treatment Considerations: Per symptoms.   Return for Follow Up: in 3 weeks     Pertinent Background:  Diagnoses include bipolar disorder, PTSD and AKIN.  Psych critical item history includes suicidal ideation, SIB [burning during HS], trauma hx and psych hosp (<3).      Previous medication trial: Buspar, Ativan, Clonidine (tremor), Seroquel (oversedation at 25mg, nausea, dizziness), Gabapentin (higher than 300 mg TID oversedation), Paxil (increased SI), Prazosin (oversedation), Remeron (not effective), Wellbutrin ('bad\"), Guanfacine (oversedation)     [All pronouns should read as \"he\"]     Hormone Care: BALAJI Reed  Therapist: [facility] (every other week)    Interim History                                                                                                        4, 4     Since the last visit,  -Reports doing \"good.\" " "Mood and anxiety are fairly well managed, denies SI, SIB or HI.  -Since decrease in Effexor, may have slightly increased anxiety, but only needed PRN Gabapentin in addition to 600 mg in AM x 1 since last seen. Not taking Hydroxyzine either.  -Also notes possibly \"slightly crankier to myself than before.\" But manageable.  -Has not had GI follow up though had liver scan. Unsure when he is supposed to follow up with [facility].  -For now, wants to continue on current medication regimen while contacting GI follow up to determine when he should follow up with them or check LFT.    Denies any symptoms suggestive of hypomania or psychosis.    Current Suicidality/Hx of Suicide Attempts: Denies both  CoCominent Medical concerns:Denies    Medication Side Effects: The patient denies all medication side effects.      Medical Review of Systems     Apart from the symptoms mentioned int he HPI, the 14 point review of systems, including constitutional, HEENT, cardiovascular, respiratory, gastrointestinal, genitourinary, musculoskeletal, integumentary, endocrine, neurological, hematologic and allergic is entirely negative.    Pregnant: None. Nursing: None, Contraception: partner not producing sperm    Substance Use     Stop drinking couple years ago.   Denies any other substance use.      Medical cannabis use daily.    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  Mostly virtual work.    Allergy                                Imitrex [sumatriptan]    Current Medications                                                                                                       Current Outpatient Medications   Medication Sig Dispense Refill    albuterol " (PROAIR HFA/PROVENTIL HFA/VENTOLIN HFA) 108 (90 Base) MCG/ACT inhaler Inhale 2 puffs into the lungs every 6 hours 1 Inhaler 0    atomoxetine (STRATTERA) 10 MG capsule Take 1 capsule (10 mg) by mouth daily 90 capsule 0    benzoyl peroxide 5 % external liquid Apply topically daily 226 g 11    CANNABIDIOL PO       clindamycin (CLEOCIN T) 1 % external lotion Apply topically 2 times daily 60 mL 11    gabapentin (NEURONTIN) 600 MG tablet Take 1 tablet (600 mg) by mouth 3 times daily as needed (anxiety and sleep) 270 tablet 1    lamoTRIgine (LAMICTAL) 200 MG tablet TAKE 1 1/2 TABLETS BY MOUTH DAILY ALONG WITH 25 MG TABLET FOR TOTAL  MG DAILY. 135 tablet 0    lamoTRIgine (LAMICTAL) 25 MG tablet TAKE 1 TABLET BY MOUTH DAILY. TOGETHER WITH 1.5 TABLET OF 200MG TO MAKE TOTAL  MG DAILY 90 tablet 1    naloxone (NARCAN) 4 MG/0.1ML nasal spray Spray 1 spray (4 mg) into one nostril alternating nostrils as needed for opioid reversal every 2-3 minutes until assistance arrives 0.2 mL 11    ondansetron (ZOFRAN) 4 MG tablet Take 1 tablet (4 mg) by mouth every 8 hours as needed for nausea 12 tablet 0    testosterone (TESTOPEL) 75 MG subcutaneous implant pellet 825 mg by Subdermal route every 30 days      tretinoin (RETIN-A) 0.025 % external cream Apply topically At Bedtime Pea-sized amount to whole face 45 g 3    venlafaxine (EFFEXOR XR) 150 MG 24 hr capsule Take 1 capsule (150 mg) by mouth daily Together with one 75 mg capsule to make total 225 mg daily. 30 capsule 1    venlafaxine (EFFEXOR XR) 75 MG 24 hr capsule Take 1 capsule (75 mg) by mouth daily Together with one 150 mg capsule to make total of 225 mg daily. 30 capsule 1         Vitals                                                                                                                       3, 3   There were no vitals taken for this visit.        Mental Status Exam                                                                                   9, 14 cog   "    Alertness: alert  and oriented   Appearance: casually and appropriately groomed  Behavior/Demeanor: cooperative, pleasant and calm with good eye contact  Speech: regular rate and rhythm  Mood :  \"good \"  Affect:  euthymic ; was congruent to mood; was congruent to content  Thought Process (Associations):  Logical, Linear and Goal directed  Thought process (Rate):  Normal  Thought content:  no overt psychosis, denies suicidal ideation, intent or thoughts and patient does not appear to be responding to internal stimuli  Perception:  Reports none;  Denies auditory hallucinations and visual hallucinations  Attention/Concentration:  Normal  Memory:  Immediate recall intact and Short-term memory intact  Language: intact  Fund of Knowledge/Intelligence:  Average  Abstraction:  Normal  Insight:  Good  Judgment:  Good  Cognition: (6) does  appear grossly intact; formal cognitive testing was not done      Physical Exam     Motor activity/EPS:  normal  Psychomotor: normal or unremarkable      Labs and Results      Pertinent findings on review include: Review of records with relevant information reported in the HPI.  Reviewed pt's past medical record and obtained collateral information.      [place] PRESCRIPTION MONITORING PROGRAM [] was checked today:  no refills since last seen.    Answers submitted by the patient for this visit:  Patient Health Questionnaire (Submitted on 9/6/2023)  If you checked off any problems, how difficult have these problems made it for you to do your work, take care of things at home, or get along with other people?: Somewhat difficult  PHQ9 TOTAL SCORE: 6        10/18/2022     3:26 PM 4/18/2023     4:59 PM 6/15/2023     9:23 AM   PHQ   PHQ-9 Total Score 7 6 4   Q9: Thoughts of better off dead/self-harm past 2 weeks Not at all Not at all Not at all       AKIN 7 Today: N/A      7/8/2020     8:39 AM 2/3/2021     8:51 AM 6/7/2022     8:51 AM   AKIN-7 SCORE   Total Score  7 (mild anxiety) 12 (moderate " anxiety)   Total Score 13 7 12       Recent Labs   Lab Test 04/22/22  1322 04/12/21  1329   CR 1.06 1.10*   GFRESTIMATED >90 68     6/26/2023: Cr 1.03, eGFR 100    Recent Labs   Lab Test 04/22/22  1322 05/10/21  1307 04/12/21  1329   AST 31 36 45   ALT 55* 92* 107*   ALKPHOS 80  --  102     6/6/2023: AST 38, ALT 88  3/31/2021: ALT 89,   6/8/2020: ALT 82,   6/1/2020: ALT 81,   11/19/2020 ALT 63,   1/13/2019: ALT 22     Vitamin D 24 (4/12/2021)     PSYCHOTROPIC DRUG INTERACTIONS:    no     MANAGEMENT: N/A    Impression/Assessment      Omar Kaplan is a 33 year old adult  who presents for med management follow up.  Pt appears stable in his mood and anxiety, denies SI, SIB or HI during the appointment. Pt noted slight increase in anxiety since reduced Effexor dosage, but feels manageable. Pt is unsure when he has to follow up with GI provider for elevated LFT. Discussed possibility of checking LFT with GI or this writer and pt decided to contact GI provider and see when he is supposed to follow up with LFT. Recommended pt to discuss with GI that Effexor was decreased to current dose 1 month ago.  Pt wants to continue on current medication regimen dose for now while monitoring for LFT. OK to continue on current medication regimen. Pt declined Gabapentin refill today.    Diagnosis                                                                   Bipolar 2 disorder  PTSD  AKIN  ADHD    Treatment Recommendation & Plan       Medication Ordered/Consults/Labs/tests Ordered:     Medication: Continue on current medication regimen.    OTC Recommendations: none  Lab Orders:  none  Referrals: none  Release of Information: none  Future Treatment Considerations: Per symptoms.  LFT if not followed by GI.  Return for Follow Up: in 3 months    -Discussed safety plan for suicidal thoughts  -Discussed plan for suicidality  -Discussed available emergency services  -Patient agrees with the treatment plan  -Encouraged to continue outpatient  therapy to gain more coping mechanism for stress.    Treatment Risk Statement: Discussed with the patient my impressions, as well as recommended studies. I educated patient on the differential diagnosis and prognosis. I discussed with the patient the risks and benefits of medications versus no interventions, including efficacy, dose, possible side effects and length of treatment and the importance of medication compliance.  The patient understands the risks, benefits, adverse effects and alternatives. Agrees to treatment with the capacity to do so. No medical contraindications to treatment. The patient also understands the risks of using street drugs or alcohol.    CRISIS NUMBERS:   Provided routinely in AVS.    Diagnosis or treatment significantly limited by social determinants of health.        Dayan Blackmon, KOURTNEY,  09/06/2023

## 2023-10-05 ENCOUNTER — MYC MEDICAL ADVICE (OUTPATIENT)
Dept: PSYCHIATRY | Facility: CLINIC | Age: 33
End: 2023-10-05

## 2023-10-05 DIAGNOSIS — F90.2 ATTENTION DEFICIT HYPERACTIVITY DISORDER (ADHD), COMBINED TYPE: ICD-10-CM

## 2023-10-06 NOTE — TELEPHONE ENCOUNTER
Writer called pharmacy to inquire about cost of patient's Strattera. Pharmacy staff state the prescription is going through insurance, but the co-pay is $97. Pharmacy staff state that patient last picked up the prescription in June with state insurance, but it looks like patient has private insurance now.     Other options:  Cost Plus - $9.20 for 30 day supply of 10 mg atomoxetine    Keira Graham   One time - $5.03, Standard Coupon $25.03

## 2023-10-09 RX ORDER — ATOMOXETINE 10 MG/1
10 CAPSULE ORAL DAILY
Qty: 90 CAPSULE | Refills: 0 | Status: SHIPPED | OUTPATIENT
Start: 2023-10-09 | End: 2023-12-28

## 2023-10-09 NOTE — TELEPHONE ENCOUNTER
Writer attempted to call patient to discuss options to help decrease cost of their medication. LVM to notify of [employer] message and provided main clinic number for call back.

## 2023-12-19 ENCOUNTER — DOCUMENTATION ONLY (OUTPATIENT)
Dept: PSYCHIATRY | Facility: CLINIC | Age: 33
End: 2023-12-19

## 2023-12-27 ENCOUNTER — TELEPHONE (OUTPATIENT)
Dept: PSYCHIATRY | Facility: CLINIC | Age: 33
End: 2023-12-27

## 2023-12-27 NOTE — TELEPHONE ENCOUNTER
[facility] Call Center    Phone Message    May a detailed message be left on voicemail: yes     Reason for Call: Other: Patient was on state insurance and recently switched to [employer]. Costs for co-pays have been prohibitive for patient's care and would like to discuss financial assistance options with a .     Action Taken: Other: P PSYCHIATRY SOCIAL WORK    Travel Screening: Not Applicable

## 2023-12-28 ENCOUNTER — PATIENT OUTREACH (OUTPATIENT)
Dept: CARE COORDINATION | Facility: CLINIC | Age: 33
End: 2023-12-28

## 2023-12-28 ENCOUNTER — VIRTUAL VISIT (OUTPATIENT)
Dept: PSYCHIATRY | Facility: CLINIC | Age: 33
End: 2023-12-28
Attending: NURSE PRACTITIONER
Payer: COMMERCIAL

## 2023-12-28 DIAGNOSIS — F41.0 PANIC ATTACKS: ICD-10-CM

## 2023-12-28 DIAGNOSIS — F31.31 BIPOLAR AFFECTIVE DISORDER, CURRENTLY DEPRESSED, MILD (H): Primary | ICD-10-CM

## 2023-12-28 DIAGNOSIS — F43.10 PTSD (POST-TRAUMATIC STRESS DISORDER): ICD-10-CM

## 2023-12-28 DIAGNOSIS — F41.1 GAD (GENERALIZED ANXIETY DISORDER): ICD-10-CM

## 2023-12-28 DIAGNOSIS — F90.2 ATTENTION DEFICIT HYPERACTIVITY DISORDER (ADHD), COMBINED TYPE: ICD-10-CM

## 2023-12-28 PROCEDURE — 99214 OFFICE O/P EST MOD 30 MIN: CPT | Mod: VID | Performed by: NURSE PRACTITIONER

## 2023-12-28 PROCEDURE — 96127 BRIEF EMOTIONAL/BEHAV ASSMT: CPT | Mod: VID | Performed by: NURSE PRACTITIONER

## 2023-12-28 RX ORDER — GABAPENTIN 600 MG/1
600 TABLET ORAL 3 TIMES DAILY PRN
Qty: 270 TABLET | Refills: 4 | Status: SHIPPED | OUTPATIENT
Start: 2023-12-28 | End: 2024-06-25

## 2023-12-28 RX ORDER — LAMOTRIGINE 25 MG/1
TABLET ORAL
Qty: 90 TABLET | Refills: 1 | Status: SHIPPED | OUTPATIENT
Start: 2023-12-28 | End: 2024-04-04

## 2023-12-28 RX ORDER — LAMOTRIGINE 200 MG/1
TABLET ORAL
Qty: 135 TABLET | Refills: 1 | Status: SHIPPED | OUTPATIENT
Start: 2023-12-28 | End: 2024-04-04

## 2023-12-28 RX ORDER — VENLAFAXINE HYDROCHLORIDE 150 MG/1
150 CAPSULE, EXTENDED RELEASE ORAL DAILY
Qty: 90 CAPSULE | Refills: 1 | Status: SHIPPED | OUTPATIENT
Start: 2023-12-28 | End: 2024-04-04

## 2023-12-28 ASSESSMENT — PATIENT HEALTH QUESTIONNAIRE - PHQ9
SUM OF ALL RESPONSES TO PHQ QUESTIONS 1-9: 7
SUM OF ALL RESPONSES TO PHQ QUESTIONS 1-9: 7
10. IF YOU CHECKED OFF ANY PROBLEMS, HOW DIFFICULT HAVE THESE PROBLEMS MADE IT FOR YOU TO DO YOUR WORK, TAKE CARE OF THINGS AT HOME, OR GET ALONG WITH OTHER PEOPLE: SOMEWHAT DIFFICULT

## 2023-12-28 NOTE — LETTER
[facility] CARE COORDINATION  [facility]  January 2, 2024    Parveen Kaplan  [address] [place] [address]      Dear Parveen,    I am a clinic care coordinator who works with LUISA Fonseca CNP with the [facility]. I have been trying to reach you recently to introduce Clinic Care Coordination. Below is a description of clinic care coordination and how I can further assist you.       The clinic care coordination team is made up of a registered nurse, , and financial resource worker who understand the health care system. The goal of clinic care coordination is to help you manage your health and improve access to the health care system. Our team works alongside your provider to assist you in determining your health and social needs. We can help you obtain health care and community resources, providing you with necessary information and education. We can work with you through any barriers and develop a care plan that helps coordinate and strengthen the communication between you and your care team.  Our services are voluntary and are offered without charge to you personally.    Please feel free to contact me with any questions or concerns regarding care coordination and what we can offer.      We are focused on providing you with the highest-quality healthcare experience possible.    Sincerely,     JOE Mar  Clinic Care Coordination  [facility]  [email or web address]  [phone]

## 2023-12-28 NOTE — PROGRESS NOTES
Clinic Care Coordination Contact  [facility]/Voicemail    Clinical Data: Care Coordinator Outreach    Outreach Documentation Number of Outreach Attempt   12/28/2023   2:02 PM 1     Left message on patient's voicemail with call back information and requested return call.    Plan: Care Coordinator will try to reach patient again in 1-2 business days.    Alessandra Roberto YANIRA  Clinic Care Coordination  [facility]  [email or web address]  [phone]

## 2023-12-28 NOTE — PROGRESS NOTES
"Virtual Visit Details    Type of service:  Video Visit     Originating Location (pt. Location): Home  Distant Location (provider location):  Off-site  Platform used for Video Visit: [facility]    Psychiatry Clinic Progress Note                                                                  Patient Name: Parveen Kaplan  YOB: 1990  MRN: [id number]  Date of Service:  12/28/2023  Last Seen: 9/6/2023    Parveen Kaplan is a 33 year old person assigned female at birth, identifies as male who uses the name Omar and pronoun he.       Omar Kaplan is a 33 year old year old adult who is being evaluated via a billable telepsychiatry visit for ongoing psychiatric care. Omar Kaplan was last seen on 9/6/2023.    At that time,     Medication Ordered/Consults/Labs/tests Ordered:     Medication: Continue on current medication regimen.    OTC Recommendations: none  Lab Orders:  none  Referrals: none  Release of Information: none  Future Treatment Considerations: Per symptoms.  LFT if not followed by GI.  Return for Follow Up: in 3 months    Pertinent Background:  Diagnoses include bipolar disorder, PTSD and AKIN.  Psych critical item history includes suicidal ideation, SIB [burning during HS], trauma hx and psych hosp (<3).      Previous medication trial: Strattera (10 mg effective, cost prohibitive), Buspar, Ativan, Clonidine (tremor), Seroquel (oversedation at 25mg, nausea, dizziness), Gabapentin (higher than 300 mg TID oversedation), Paxil (increased SI), Prazosin (oversedation), Remeron (not effective), Wellbutrin ('bad\"), Guanfacine (oversedation)     [All pronouns should read as \"he\"]     Hormone Care: BALAJI Reed  Therapist: [facility] (every other week)    Interim History                                                                                                        4, 4     On 12/19/23, pt was no show.    Since the last visit,  -Has not taken Strattera since shortly after last seen due to " cost. Tried [facility] cost pharmacy, but with shipping, it was more than what he can afford. He can't pay more than $10/month for medication.  -ADHD is not well managed well without Strattera, but reports he has navigated the world without the medication previously and managing.    -Prefer not to use stimulant at this time.  -Also about the same timeline, has only been taking Effexor at 150 mg daily. Pharmacy was not dispensing both 150 and 75 mg.  -But mood and anxiety have been manageable, denies SI, SIB or HI.   -During holiday season, anxiety exacerbated, but feels now it's manageable. Taking Gabapentin 600 mg in AM only mostly with occasional BID to TID use.  -Since stable, wants to continue on current medication regimen.  -Has LFT follow up in 3/2024 by hormone care provider. Should be following up with GI, but this is cost prohibitive.  No yellowing of skin or sclera.    Denies any symptoms suggestive of hypomania or psychosis.    Current Suicidality/Hx of Suicide Attempts: Denies both  CoCominent Medical concerns:Denies    Medication Side Effects: The patient denies all medication side effects.      Medical Review of Systems     Apart from the symptoms mentioned int he HPI, the 14 point review of systems, including constitutional, HEENT, cardiovascular, respiratory, gastrointestinal, genitourinary, musculoskeletal, integumentary, endocrine, neurological, hematologic and allergic is entirely negative.    Pregnant: None. Nursing: None, Contraception: partner not producing sperm    Substance Use     Stop drinking couple years ago.   Denies any other substance use.      Medical cannabis use daily.    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  Mostly virtual work.    Allergy                                Imitrex [sumatriptan]    Current Medications                                                                                                       Current Outpatient Medications   Medication Sig Dispense Refill    albuterol (PROAIR HFA/PROVENTIL HFA/VENTOLIN HFA) 108 (90 Base) MCG/ACT inhaler Inhale 2 puffs into the lungs every 6 hours 1 Inhaler 0    atomoxetine (STRATTERA) 10 MG capsule Take 1 capsule (10 mg) by mouth daily 90 capsule 0    benzoyl peroxide 5 % external liquid Apply topically daily 226 g 11    CANNABIDIOL PO       clindamycin (CLEOCIN T) 1 % external lotion Apply topically 2 times daily 60 mL 11    gabapentin (NEURONTIN) 600 MG tablet Take 1 tablet (600 mg) by mouth 3 times daily as needed (anxiety and sleep) 270 tablet 1    lamoTRIgine (LAMICTAL) 200 MG tablet TAKE 1 1/2 TABLETS BY MOUTH DAILY ALONG WITH 25 MG TABLET FOR TOTAL  MG DAILY. 135 tablet 1    lamoTRIgine (LAMICTAL) 25 MG tablet TAKE 1 TABLET BY MOUTH DAILY. TOGETHER WITH 1.5 TABLET OF 200MG TO MAKE TOTAL  MG DAILY 90 tablet 1    naloxone (NARCAN) 4 MG/0.1ML nasal spray Spray 1 spray (4 mg) into one nostril alternating nostrils as needed for opioid reversal every 2-3 minutes until assistance arrives 0.2 mL 11    ondansetron (ZOFRAN) 4 MG tablet Take 1 tablet (4 mg) by mouth every 8 hours as needed for nausea 12 tablet 0    testosterone (TESTOPEL) 75 MG subcutaneous implant pellet 825 mg by Subdermal route every 30 days      tretinoin (RETIN-A) 0.025 % external cream Apply topically At Bedtime Pea-sized amount to whole face 45 g 3    venlafaxine (EFFEXOR XR) 150 MG 24 hr capsule Take 1 capsule (150 mg) by mouth daily Together with one 75 mg capsule to make total 225 mg daily. 30 capsule 1    venlafaxine (EFFEXOR XR) 75 MG 24 hr capsule Take 1 capsule (75 mg) by mouth daily Together with one 150 mg capsule to make total of 225 mg  "daily. 30 capsule 1         Vitals                                                                                                                       3, 3   There were no vitals taken for this visit.        Mental Status Exam                                                                                   9, 14 cog      Alertness: alert  and oriented   Appearance: casually and appropriately groomed  Behavior/Demeanor: cooperative, pleasant and calm with good eye contact  Speech: regular rate and rhythm  Mood :  \"good \"  Affect:  mostly euthymic ; was congruent to mood; was congruent to content  Thought Process (Associations):  Logical, Linear and Goal directed  Thought process (Rate):  Normal  Thought content:  no overt psychosis, denies suicidal ideation, intent or thoughts and patient does not appear to be responding to internal stimuli  Perception:  Reports none;  Denies auditory hallucinations and visual hallucinations  Attention/Concentration:  Normal  Memory:  Immediate recall intact and Short-term memory intact  Language: intact  Fund of Knowledge/Intelligence:  Average  Abstraction:  Normal  Insight:  Good  Judgment:  Good  Cognition: (6) does  appear grossly intact; formal cognitive testing was not done      Physical Exam     Motor activity/EPS:  normal  Psychomotor: normal or unremarkable      Labs and Results      Pertinent findings on review include: Review of records with relevant information reported in the HPI.  Reviewed pt's past medical record and obtained collateral information.      [place] PRESCRIPTION MONITORING PROGRAM [] was checked today:  no refills since last seen.    Answers submitted by the patient for this visit:  Patient Health Questionnaire (Submitted on 12/28/2023)  If you checked off any problems, how difficult have these problems made it for you to do your work, take care of things at home, or get along with other people?: Somewhat difficult  PHQ9 TOTAL SCORE: 7          4/18/2023 "     4:59 PM 6/15/2023     9:23 AM 2023     3:29 PM   PHQ   PHQ-9 Total Score 6 4 6   Q9: Thoughts of better off dead/self-harm past 2 weeks Not at all Not at all Not at all       AKIN 7 Today: N/A      2020     8:39 AM 2/3/2021     8:51 AM 2022     8:51 AM   AKIN-7 SCORE   Total Score  7 (mild anxiety) 12 (moderate anxiety)   Total Score 13 7 12       Recent Labs   Lab Test 22  1322 21  1329   CR 1.06 1.10*   GFRESTIMATED >90 68     2023: Cr 1.03, eGFR 100    Recent Labs   Lab Test 22  1322 05/10/21  1307 21  1329   AST 31 36 45   ALT 55* 92* 107*   ALKPHOS 80  --  102     2023: AST 38, ALT 88  3/31/2021: ALT 89,   2020: ALT 82,   2020: ALT 81,   2020 ALT 63,   2019: ALT 22     Vitamin D 24 (2021)     PSYCHOTROPIC DRUG INTERACTIONS:    no     MANAGEMENT: N/A    Impression/Assessment      Omar Kaplan is a 33 year old adult  who presents for med management follow up.  Pt appears mostly stable in his mood and anxiety, denies SI, SIB or HI during the appointment. PHQ 9 mildly elevated. Pt has only been taking Effexor 150 mg daily x 3 months due to pharmacy confusion on 225 mg dose, but mood and anxiety feels manageable. Pt also has not been able to take Strattera about the same time due to cost.  Discussed different medication discount program, but pt is unable to pay more than $10/month for the medication. Reviewed previous medication intolerance and discussed possible stimulant trial, but pt does not want to try stimulant at this time and manage ADHD without any medication. Discontinued Strattera. Also since pt reports he has been doing fairly well,  Effexor to 150 mg daily as this is the dose he has been taking and continue all other medication regimen.    Also discussed previous elevated ALT in 2023. Pt can't afford to follow up with GI, but has a lab follow up with hormone care provider in 3/2024. If ALT elevation was due to Effexor, ALT  may decrease at next lab. However, if ALT is worse, may consider tapering down/off SNRI in the future or consult with GI. Pt was instructed to update once follow up lab is completed.    Also discussed in case if his insurance does not cover this writer' service, recommended different provider at [facility].    Diagnosis                                                                   Bipolar 2 disorder  PTSD  AKIN  ADHD    Treatment Recommendation & Plan       Medication Ordered/Consults/Labs/tests Ordered:     Medication:   -Strattera is discontinued due to cost.  -Continue on all other current medication regimen including Venlafaxine 150 mg daily.  OTC Recommendations: none  Lab Orders:  none  Referrals: none  Release of Information: none  Future Treatment Considerations: Per symptoms.  LFT monitoring by [facility] provider  Return for Follow Up: in 6 months per pt's request    -Discussed safety plan for suicidal thoughts  -Discussed plan for suicidality  -Discussed available emergency services  -Patient agrees with the treatment plan  -Encouraged to continue outpatient therapy to gain more coping mechanism for stress.    Treatment Risk Statement: Discussed with the patient my impressions, as well as recommended studies. I educated patient on the differential diagnosis and prognosis. I discussed with the patient the risks and benefits of medications versus no interventions, including efficacy, dose, possible side effects and length of treatment and the importance of medication compliance.  The patient understands the risks, benefits, adverse effects and alternatives. Agrees to treatment with the capacity to do so. No medical contraindications to treatment. The patient also understands the risks of using street drugs or alcohol.    CRISIS NUMBERS:   Provided routinely in AVS.    Diagnosis or treatment significantly limited by social determinants of health.        Dayan Blackmon, CNP,  12/28/2023

## 2023-12-28 NOTE — NURSING NOTE
Is the patient currently in the state of [place]? YES    Visit mode:VIDEO    If the visit is dropped, the patient can be reconnected by: VIDEO VISIT: Text to cell phone:   Telephone Information:   Mobile [phone]       Will anyone else be joining the visit? NO  (If patient encounters technical issues they should call [phone])    How would you like to obtain your AVS? MyChart    Are changes needed to the allergy or medication list? Pt stated no changes to allergies and Pt stated no med changes    Reason for visit: NINA TORRESF

## 2023-12-28 NOTE — PATIENT INSTRUCTIONS
-Strattera is discontinued due to cost.  -Continue on all other current medication regimen including Venlafaxine 150 mg daily.    -In case visit cost with luis is cost prohibitive, Parveen Capone is at [facility] [email or web address]    Your next appointment is scheduled on 6/25/2024 (Tue) at noon.    Thank you for coming to the [facility] MENTAL HEALTH & ADDICTION [place] [facility].    Lab Testing:  If you had lab testing today and your results are reassuring or normal they will be mailed to you or sent through [employer] within 7 days. If the lab tests need quick action we will call you with the results. The phone number we will call with results is # [phone] (home) . If this is not the best number please call our clinic and change the number.    Medication Refills:  If you need any refills please call your pharmacy and they will contact us. Our fax number for refills is [phone]. Please allow three business for refill processing. If you need to  your refill at a new pharmacy, please contact the new pharmacy directly. The new pharmacy will help you get your medications transferred.     Scheduling:  If you have any concerns about today's visit or wish to schedule another appointment please call our office during normal business hours [phone] (8-5:00 M-F)    Contact Us:  Please call [phone] during business hours (8-5:00 M-F).  If after clinic hours, or on the weekend, please call  [phone].    Financial Assistance [phone]  [facility] Billing [phone]  Central Billing Office, [facility]: [phone]  [place] Billing [phone]  Medical Records [phone]      MENTAL HEALTH CRISIS NUMBERS:  For a medical emergency please call  041 or go to the nearest ER.     [facility]:   [facility] -[phone]   Crisis Residence [facility] -[phone]   Walk-In Counseling Center [facility] -[phone]   COPE 24/7 [facility]  Team -[phone] (adults)/[phone] (child)  CHILD: [facility] needs assessment team - [phone]      [facility]:   [facility] - [phone]   Walk-in counseling [facility] - [phone]   Walk-in counseling [facility] - [phone]   Crisis Residence [facility] Residence - [phone]  Urgent Care Adult Mental [place]-[phone] mobile unit/ 24/7 crisis line    National Crisis Numbers:   National Suicide Prevention Lifeline: 2-017-222-TALK (788-949-0720)  Poison Control Center - 1-562.154.2989  [employer]/resources for a list of additional resources (SOS)  Trans Lifeline a hotline for transgender people 0-920-348-7428  The [employer] a hotline for LGBT youth 1-999.898.1171  Crisis Text Line: For any crisis 24/7   To: 181060  see www.crisistextline.org  - IF MAKING A CALL FEELS TOO HARD, send a text!         Again thank you for choosing [facility] MENTAL HEALTH & ADDICTION [place] [facility] and please let us know how we can best partner with you to improve you and your family's health.    You may be receiving a survey regarding this appointment. We would love to have your feedback, both positive and negative. The survey is done by an external company, so your answers are anonymous.

## 2024-01-02 NOTE — PROGRESS NOTES
Clinic Care Coordination Contact  [facility]/Voicemail    Clinical Data: Care Coordinator Outreach    Outreach Documentation Number of Outreach Attempt   12/28/2023   2:02 PM 1   1/2/2024  10:16 AM 2     Left message on patient's voicemail with call back information and requested return call.    Plan: Care Coordinator will send unable to contact letter with care coordinator contact information via [employer]. Care Coordinator will try to reach patient again in 3-5 business days.    JOE Mar  Clinic Care Coordination  [facility]  [email or web address]  [phone]

## 2024-01-03 NOTE — PROGRESS NOTES
Clinic Care Coordination Contact  Clinic Care Coordination Contact  OUTREACH    Referral Information: LUISA Fonseca, CNP     Chief Complaint   Patient presents with    Clinic Care Coordination - Initial      Universal Utilization:    Utilization      No Show Count (past year)  2             ED Visits  0             Hospital Admissions  0                    Current as of: 1/2/2024  3:45 PM              Clinical Concerns:  Current Medical Concerns: Did not discuss.    Current Behavioral Concerns: CC received return call from patient. Patient states that he is struggling to afford his copays for his appointments. He is looking for assistance options, and is also interested in options for sliding scale services.    Education Provided to patient: Patient was provided with the contact information for [facility] and was provided with additional sliding scale resources.     Medication Management:  Medication review status: Did not discuss.    Lifestyle & Psychosocial Needs:    Social Determinants of Health     Food Insecurity: Not on file   Depression: Not at risk (12/28/2023)    PHQ-2     PHQ-2 Score: 2   Housing Stability: Not on file   Tobacco Use: Low Risk  (12/28/2023)    Patient History     Smoking Tobacco Use: Never     Smokeless Tobacco Use: Never     Passive Exposure: Not on file   Financial Resource Strain: Not on file   Alcohol Use: Not on file   Transportation Needs: Not on file   Physical Activity: Not on file   Interpersonal Safety: Not on file   Stress: Not on file   Social Connections: Not on file     Patient/Caregiver understanding: Patient verbalized understanding, engaged in AIDET communication during patient encounter.     Future Appointments                In 5 months Dayan Blackmon APRN CNP [facility] Mental Health & Addiction [facility], [facility]          Plan: Will send resources to patient via [employer], per his request.    Care Coordinator will follow up  with patient in one month.    Alessandra Roberto, [facility]  Clinic Care Coordination  [facility]  [email or web address]  [phone]

## 2024-02-02 ENCOUNTER — PATIENT OUTREACH (OUTPATIENT)
Dept: CARE COORDINATION | Facility: CLINIC | Age: 34
End: 2024-02-02

## 2024-02-02 NOTE — PROGRESS NOTES
Clinic Care Coordination Contact  [facility]/Voicemail    Clinical Data: Care Coordinator Outreach    Outreach Documentation Number of Outreach Attempt   2/2/2024  12:47 PM 1     Left message on patient's voicemail with call back information and requested return call.    Plan: Care Coordinator will try to reach patient again in 1 month.    JOE Mar  Clinic Care Coordination  [facility]  [email or web address]  [phone]

## 2024-02-28 ENCOUNTER — PATIENT OUTREACH (OUTPATIENT)
Dept: CARE COORDINATION | Facility: CLINIC | Age: 34
End: 2024-02-28

## 2024-02-28 NOTE — PROGRESS NOTES
Clinic Care Coordination Contact  [facility]/Voicemail    Clinical Data: Care Coordinator Outreach    Outreach Documentation Number of Outreach Attempt   2/2/2024  12:47 PM 1   2/28/2024  10:18 AM 2     Left message on patient's voicemail with call back information and requested return call.    Plan: Care Coordinator will do no further outreaches at this time.    Alessandra Roberto [facility]  Clinic Care Coordination  [facility]  [email or web address]  [phone]

## 2024-03-29 DIAGNOSIS — F41.0 PANIC ATTACKS: ICD-10-CM

## 2024-03-29 DIAGNOSIS — F31.31 BIPOLAR AFFECTIVE DISORDER, CURRENTLY DEPRESSED, MILD (H): ICD-10-CM

## 2024-03-29 RX ORDER — VENLAFAXINE HYDROCHLORIDE 150 MG/1
150 CAPSULE, EXTENDED RELEASE ORAL DAILY
Qty: 90 CAPSULE | Refills: 1 | OUTPATIENT
Start: 2024-03-29

## 2024-03-29 NOTE — TELEPHONE ENCOUNTER
Date of Last Office Visit: 12/28/2023  [facility] Mental Health & Addiction [facility]     Dayan Blackmon APRN CNP     RTC: 6 months   No shows: 0  Cancellations: 0  Date of Next Office Visit:   6/25/2024 Status: Bruno    Arrive By: 11:45 AM     Appointment Time: 12:00 PM Length: 30   Visit Type: ADULT PSYCHIATRY RETURN [[id number]] VERA: [id number]   Provider: Dayan Blackmon APRN CNP Department: [facility] PSYCHIATRY     ------------------------------  venlafaxine (EFFEXOR XR) 150 MG 24 hr capsule 90 capsule 1 12/28/2023 -   Sig - Route: Take 1 capsule (150 mg) by mouth daily - Oral  Sent to pharmacy as: Venlafaxine HCl  MG Oral Capsule Extended Release 24 Hour (EFFEXOR XR)      Class: E-Prescribe  ------------------------------     Last Visit Treatment Plan     Medication:   -Strattera is discontinued due to cost.  -Continue on all other current medication regimen including Venlafaxine 150 mg daily.  OTC Recommendations: none  Lab Orders:  none  Referrals: none  Release of Information: none  Future Treatment Considerations: Per symptoms.  LFT monitoring by [facility] provider  Return for Follow Up: in 6 months per pt's request        Medication unable to be refilled by RN due to criteria not met as indicated.                 []Eligibility - not seen in the last year              []Supervision - no future appointment              []Compliance - no shows, cancellations or lapse in therapy              []Verification - order discrepancy              []Controlled medication              []Medication not included in policy              []90-day supply request              [x]Other: request too soon - was given 6 mos supply last visit

## 2024-04-03 ENCOUNTER — MYC MEDICAL ADVICE (OUTPATIENT)
Dept: PSYCHIATRY | Facility: CLINIC | Age: 34
End: 2024-04-03

## 2024-04-03 DIAGNOSIS — F41.0 PANIC ATTACKS: ICD-10-CM

## 2024-04-03 DIAGNOSIS — F31.31 BIPOLAR AFFECTIVE DISORDER, CURRENTLY DEPRESSED, MILD (H): ICD-10-CM

## 2024-04-04 RX ORDER — LAMOTRIGINE 25 MG/1
TABLET ORAL
Qty: 90 TABLET | Refills: 1 | Status: SHIPPED | OUTPATIENT
Start: 2024-04-04 | End: 2024-08-06

## 2024-04-04 RX ORDER — LAMOTRIGINE 200 MG/1
TABLET ORAL
Qty: 135 TABLET | Refills: 1 | Status: SHIPPED | OUTPATIENT
Start: 2024-04-04 | End: 2024-08-06

## 2024-04-04 RX ORDER — VENLAFAXINE HYDROCHLORIDE 150 MG/1
150 CAPSULE, EXTENDED RELEASE ORAL DAILY
Qty: 90 CAPSULE | Refills: 1 | Status: SHIPPED | OUTPATIENT
Start: 2024-04-04 | End: 2024-08-06

## 2024-04-04 NOTE — TELEPHONE ENCOUNTER
Last seen: 12/28/2023  RTC: 6 months  Cancel: 0  No-show: 0  Next appt: 06/25/2024     Incoming refill from Patient via [employer]    Medication requested:   Pending Prescriptions:                       Disp   Refills    lamoTRIgine (LAMICTAL) 200 MG tablet      135 ta*1            Sig: TAKE 1 1/2 TABLETS BY MOUTH DAILY ALONG WITH 25           MG TABLET FOR TOTAL  MG DAILY.    lamoTRIgine (LAMICTAL) 25 MG tablet       90 tab*1            Sig: TAKE 1 TABLET BY MOUTH DAILY. TOGETHER WITH 1.5           TABLET OF 200MG TO MAKE TOTAL  MG DAILY    venlafaxine (EFFEXOR XR) 150 MG 24 hr cap*90 cap*1            Sig: Take 1 capsule (150 mg) by mouth daily      From chart note:   -Continue on all other current medication regimen including Venlafaxine 150 mg daily.      Medication unable to be refilled by RN due to criteria not met as indicated.                 []Eligibility - not seen in the last year              []Supervision - no future appointment              []Compliance - no shows, cancellations or lapse in therapy              []Verification - order discrepancy              []Controlled medication              []Medication not included in policy              [x]90-day supply request              []Other:

## 2024-06-25 ENCOUNTER — VIRTUAL VISIT (OUTPATIENT)
Dept: PSYCHIATRY | Facility: CLINIC | Age: 34
End: 2024-06-25
Attending: NURSE PRACTITIONER
Payer: COMMERCIAL

## 2024-06-25 DIAGNOSIS — F31.31 BIPOLAR AFFECTIVE DISORDER, CURRENTLY DEPRESSED, MILD (H): ICD-10-CM

## 2024-06-25 DIAGNOSIS — F41.0 PANIC ATTACKS: ICD-10-CM

## 2024-06-25 DIAGNOSIS — F90.2 ATTENTION DEFICIT HYPERACTIVITY DISORDER (ADHD), COMBINED TYPE: Primary | ICD-10-CM

## 2024-06-25 PROCEDURE — 96127 BRIEF EMOTIONAL/BEHAV ASSMT: CPT | Mod: 95 | Performed by: NURSE PRACTITIONER

## 2024-06-25 PROCEDURE — G2211 COMPLEX E/M VISIT ADD ON: HCPCS | Mod: 95 | Performed by: NURSE PRACTITIONER

## 2024-06-25 PROCEDURE — 99214 OFFICE O/P EST MOD 30 MIN: CPT | Mod: 95 | Performed by: NURSE PRACTITIONER

## 2024-06-25 RX ORDER — GABAPENTIN 600 MG/1
600 TABLET ORAL 3 TIMES DAILY PRN
Qty: 270 TABLET | Refills: 4 | Status: SHIPPED | OUTPATIENT
Start: 2024-06-25 | End: 2024-08-06

## 2024-06-25 RX ORDER — LAMOTRIGINE 200 MG/1
TABLET ORAL
Qty: 135 TABLET | Refills: 1 | Status: CANCELLED | OUTPATIENT
Start: 2024-06-25

## 2024-06-25 RX ORDER — ATOMOXETINE 10 MG/1
20 CAPSULE ORAL DAILY
Qty: 60 CAPSULE | Refills: 1 | Status: SHIPPED | OUTPATIENT
Start: 2024-06-25 | End: 2024-08-06

## 2024-06-25 ASSESSMENT — PAIN SCALES - GENERAL: PAINLEVEL: NO PAIN (0)

## 2024-06-25 NOTE — NURSING NOTE
Is the patient currently in the state of [place]? YES    Visit mode:VIDEO    If the visit is dropped, the patient can be reconnected by: VIDEO VISIT: Text to cell phone:   Telephone Information:   Mobile [phone]       Will anyone else be joining the visit? NO  (If patient encounters technical issues they should call [phone]    How would you like to obtain your AVS? MyChart    Are changes needed to the allergy or medication list? No    Are refills needed on medications prescribed by this physician? YES    Reason for visit: NINA NAGY

## 2024-06-25 NOTE — PROGRESS NOTES
"Virtual Visit Details    Type of service:  Video Visit     Originating Location (pt. Location): Home  Distant Location (provider location):  On-site  Platform used for Video Visit: [facility]    Psychiatry Clinic Progress Note                                                                  Patient Name: Parveen Kaplan  YOB: 1990  MRN: [id number]  Date of Service:  06/25/2024  Last Seen: 12/28/2023    Parveen Kaplan is a 34 year old person assigned female at birth, identifies as male who uses the name Omar and pronoun he.       Omar Kaplan is a 34 year old year old adult who is being evaluated via a billable telepsychiatry visit for ongoing psychiatric care. Omar Kaplan was last seen on 12/28/2023.    At that time,     Medication Ordered/Consults/Labs/tests Ordered:     Medication:   -Strattera is discontinued due to cost.  -Continue on all other current medication regimen including Venlafaxine 150 mg daily.  OTC Recommendations: none  Lab Orders:  none  Referrals: none  Release of Information: none  Future Treatment Considerations: Per symptoms.  LFT monitoring by [facility] provider  Return for Follow Up: in 6 months per pt's request      Pertinent Background:  Diagnoses include bipolar disorder, PTSD and AKIN.  Psych critical item history includes suicidal ideation, SIB [burning during HS], trauma hx and psych hosp (<3).      Previous medication trial: Strattera (10 mg effective, cost prohibitive), Buspar, Ativan, Clonidine (tremor), Seroquel (oversedation at 25mg, nausea, dizziness), Gabapentin (higher than 300 mg TID oversedation), Paxil (increased SI), Prazosin (oversedation), Remeron (not effective), Wellbutrin ('bad\"), Guanfacine (oversedation)     [All pronouns should read as \"he\"]     Hormone Care: BALAJI Reed  Therapist: [facility] (every other week)    Interim History                                                                                                        4, 4     On " 4/3/2024, pt sent a [employer] message noting he will be restarting Strattera.    Since the last visit,  -Has been doing well. Especially after cutting a friendship off with one particular person about 1 month ago. Improved fatigue, better sleep, more socializing.  -Mood and anxiety have been well managed, denies Si, SIB or HI.  -Continues to take Gabapentin 600 mg in AM only with infrequent BID or TID use since anxiety is well managed.  -Retarted Strattera, but feels no longer the medication is effective and interested in increasing the dose.  -Has hepatologist that they follow up. Will be completing U/S in 6 months. No yellowing of skin or sclera.  -Received new parts for CPAP, but has not set this up yet, so not using CPAP for sleep.  -partner noted anxiety exacerbates HS occasionally.    Denies any symptoms suggestive of hypomania or psychosis.    Current Suicidality/Hx of Suicide Attempts: Denies both  CoCominent Medical concerns:Denies    Medication Side Effects: The patient denies all medication side effects.      Medical Review of Systems     Apart from the symptoms mentioned int he HPI, the 14 point review of systems, including constitutional, HEENT, cardiovascular, respiratory, gastrointestinal, genitourinary, musculoskeletal, integumentary, endocrine, neurological, hematologic and allergic is entirely negative.    Pregnant: None. Nursing: None, Contraception: partner not producing sperm    Substance Use     Stop drinking couple years ago.   Denies any other substance use.      Medical cannabis use daily.    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  Mostly virtual work.    Allergy                                 Imitrex [sumatriptan]    Current Medications                                                                                                       Current Outpatient Medications   Medication Sig Dispense Refill    albuterol (PROAIR HFA/PROVENTIL HFA/VENTOLIN HFA) 108 (90 Base) MCG/ACT inhaler Inhale 2 puffs into the lungs every 6 hours 1 Inhaler 0    benzoyl peroxide 5 % external liquid Apply topically daily 226 g 11    CANNABIDIOL PO       clindamycin (CLEOCIN T) 1 % external lotion Apply topically 2 times daily 60 mL 11    gabapentin (NEURONTIN) 600 MG tablet Take 1 tablet (600 mg) by mouth 3 times daily as needed (anxiety and sleep) 270 tablet 4    lamoTRIgine (LAMICTAL) 200 MG tablet TAKE 1 1/2 TABLETS BY MOUTH DAILY ALONG WITH 25 MG TABLET FOR TOTAL  MG DAILY. 135 tablet 1    lamoTRIgine (LAMICTAL) 25 MG tablet TAKE 1 TABLET BY MOUTH DAILY. TOGETHER WITH 1.5 TABLET OF 200MG TO MAKE TOTAL  MG DAILY 90 tablet 1    naloxone (NARCAN) 4 MG/0.1ML nasal spray Spray 1 spray (4 mg) into one nostril alternating nostrils as needed for opioid reversal every 2-3 minutes until assistance arrives 0.2 mL 11    ondansetron (ZOFRAN) 4 MG tablet Take 1 tablet (4 mg) by mouth every 8 hours as needed for nausea 12 tablet 0    testosterone (TESTOPEL) 75 MG subcutaneous implant pellet 825 mg by Subdermal route every 30 days      tretinoin (RETIN-A) 0.025 % external cream Apply topically At Bedtime Pea-sized amount to whole face 45 g 3    venlafaxine (EFFEXOR XR) 150 MG 24 hr capsule Take 1 capsule (150 mg) by mouth daily 90 capsule 1         Vitals                                                                                                                       3, 3   There were no vitals taken for this visit.        Mental Status Exam                                                                                   9, 14 cog      Alertness: alert  and oriented   Appearance: casually and appropriately  "groomed  Behavior/Demeanor: cooperative, pleasant and calm with good eye contact  Speech: regular rate and rhythm  Mood :  \"good \"  Affect:  euthymic ; was congruent to mood; was congruent to content  Thought Process (Associations):  Logical, Linear and Goal directed  Thought process (Rate):  Normal  Thought content:  no overt psychosis, denies suicidal ideation, intent or thoughts and patient does not appear to be responding to internal stimuli  Perception:  Reports none;  Denies auditory hallucinations and visual hallucinations  Attention/Concentration:  Normal  Memory:  Immediate recall intact and Short-term memory intact  Language: intact  Fund of Knowledge/Intelligence:  Average  Abstraction:  Normal  Insight:  Good  Judgment:  Good  Cognition: (6) does  appear grossly intact; formal cognitive testing was not done      Physical Exam     Motor activity/EPS:  normal  Psychomotor: normal or unremarkable      Labs and Results      Pertinent findings on review include: Review of records with relevant information reported in the HPI.  Reviewed pt's past medical record and obtained collateral information.      [place] PRESCRIPTION MONITORING PROGRAM [] was checked today:  Gabapentin 3/27, 12/28.    Answers submitted by the patient for this visit:  Patient Health Questionnaire (Submitted on 6/25/2024)  If you checked off any problems, how difficult have these problems made it for you to do your work, take care of things at home, or get along with other people?: Somewhat difficult  PHQ9 TOTAL SCORE: 5          6/15/2023     9:23 AM 9/6/2023     3:29 PM 12/28/2023    11:32 AM   PHQ   PHQ-9 Total Score 4 6 7   Q9: Thoughts of better off dead/self-harm past 2 weeks Not at all Not at all Not at all       AKIN 7 Today: N/A      7/8/2020     8:39 AM 2/3/2021     8:51 AM 6/7/2022     8:51 AM   AKIN-7 SCORE   Total Score  7 (mild anxiety) 12 (moderate anxiety)   Total Score 13 7 12       Recent Labs   Lab Test 04/22/22  1322 " 04/12/21  1329   CR 1.06 1.10*   GFRESTIMATED >90 68     6/26/2023: Cr 1.03, eGFR 100    Recent Labs   Lab Test 04/22/22  1322 05/10/21  1307 04/12/21  1329   AST 31 36 45   ALT 55* 92* 107*   ALKPHOS 80  --  102     3/21/2024: ALT 79  6/6/2023: AST 38, ALT 88  3/31/2021: ALT 89,   6/8/2020: ALT 82,   6/1/2020: ALT 81,   11/19/2020 ALT 63,   1/13/2019: ALT 22     Vitamin D 24 (4/12/2021)     PSYCHOTROPIC DRUG INTERACTIONS:    no     MANAGEMENT: N/A    Impression/Assessment      Omar Kaplan is a 34 year old adult  who presents for med management follow up.  Pt appears mostly stable in his mood and anxiety, denies SI, SIB or HI during the appointment. PHQ 9 mildly elevated today. Pt noted restart of Strattera has been tolerated, but feels no longer effective and wants to increase the dose. Reviewed most recent lab, ALT continues to be elevated, but better than before. Pt is following up with hepatology. Since doing well, does not want to change Effexor dose now. Most recent /78 on 5/22/2024. Will increase Strattera to 20 mg daily while continuing all the medications for now.    Diagnosis                                                                   Bipolar 2 disorder  PTSD  AKIN  ADHD    Treatment Recommendation & Plan       Medication Ordered/Consults/Labs/tests Ordered:     Medication:   -Increase Strattera to 20 mg daily for ADHD. Monitor for yellowness of skin, white part of the eyes or itching.  -Continue all other medication regimen for now.  OTC Recommendations: none  Lab Orders:  none  Referrals: none  Release of Information: none  Future Treatment Considerations: Per symptoms.   Return for Follow Up: in 6 weeks    -Discussed safety plan for suicidal thoughts  -Discussed plan for suicidality  -Discussed available emergency services  -Patient agrees with the treatment plan  -Encouraged to continue outpatient therapy to gain more coping mechanism for stress.    Treatment Risk Statement: Discussed with  the patient my impressions, as well as recommended studies. I educated patient on the differential diagnosis and prognosis. I discussed with the patient the risks and benefits of medications versus no interventions, including efficacy, dose, possible side effects and length of treatment and the importance of medication compliance.  The patient understands the risks, benefits, adverse effects and alternatives. Agrees to treatment with the capacity to do so. No medical contraindications to treatment. The patient also understands the risks of using street drugs or alcohol.    CRISIS NUMBERS:   Provided routinely in AVS.    Diagnosis or treatment significantly limited by social determinants of health.      The longitudinal plan of care for the diagnosis(es)/condition(s) as documented were addressed during this visit. Due to the added complexity in care, I will continue to support Miles in the subsequent management and with ongoing continuity of care.    Dayan Blackmon CNP,  06/25/2024

## 2024-06-25 NOTE — PATIENT INSTRUCTIONS
-Increase Strattera to 20 mg daily for ADHD. Monitor for yellowness of skin, white part of the eyes or itching.  -Continue all other medication regimen for now.    Your next appointment is scheduled on 8/6/2024 (Tue) at 3pm.      **For crisis resources, please see the information at the end of this document**   Patient Education    Thank you for coming to the [facility] [place] [facility].     Lab Testing:  If you had lab testing today and your results are reassuring or normal they will be mailed to you or sent through [employer] within 7 days. If the lab tests need quick action we will call you with the results. The phone number we will call with results is # [phone]. If this is not the best number please call our clinic and change the number.     Medication Refills:  If you need any refills please call your pharmacy and they will contact us. Our fax number for refills is [phone].   Three business days of notice are needed for general medication refill requests.   Five business days of notice are needed for controlled substance refill requests.   If you need to change to a different pharmacy, please contact the new pharmacy directly. The new pharmacy will help you get your medications transferred.     Contact Us:  Please call [phone] during business hours (8-5:00 M-F).   If you have medication related questions after clinic hours, or on the weekend, please call [phone].     Financial Assistance [phone]   Medical Records [phone]       MENTAL HEALTH CRISIS RESOURCES:  For a emergency help, please call 911 or go to the nearest Emergency Department.     Emergency Walk-In Options:   EmPATH Unit @ [place] ([place]): [phone] - Specialized mental health emergency area designed to be calming  [facility] ([place]): [phone]  [facility] ([place]): [phone]  [facility] ([employer]):  [phone]    [facility] Crisis Information:   [place]: [phone]  [place]: [phone]  [place]) - Adult: [phone]     Child: [phone]  [place] - Adult: [phone]     Child: [phone]  [place]: [phone]  List of all [facility] resources:   https://[place].gov/dhs/people-we-serve/adults/health-care/mental-health/resources/crisis-contacts.jsp    National Crisis Information:   Crisis Text Line: Text  [place]  to 305139  Suicide & Crisis Lifeline: 988  National Suicide Prevention Lifeline: 6-921-013-TALK (1-423.138.5792)       For online chat options, visit https://suicidepreventionlifeline.org/chat/  Poison Control Center: 1-519.158.6976  Trans Lifeline: 1-313.336.5306 - Hotline for transgender people of all ages  The [employer]: 7-411-575-8822 - Hotline for LGBT youth     For Non-Emergency Support:   Fast Tracker: Mental Health & Substance Use Disorder Resources -   https://www.[employer].org/

## 2024-08-06 ENCOUNTER — VIRTUAL VISIT (OUTPATIENT)
Dept: PSYCHIATRY | Facility: CLINIC | Age: 34
End: 2024-08-06
Attending: NURSE PRACTITIONER
Payer: COMMERCIAL

## 2024-08-06 VITALS — BODY MASS INDEX: 29.35 KG/M2 | HEIGHT: 70 IN | WEIGHT: 205 LBS

## 2024-08-06 DIAGNOSIS — F41.0 PANIC ATTACKS: ICD-10-CM

## 2024-08-06 DIAGNOSIS — F31.31 BIPOLAR AFFECTIVE DISORDER, CURRENTLY DEPRESSED, MILD (H): ICD-10-CM

## 2024-08-06 DIAGNOSIS — F90.2 ATTENTION DEFICIT HYPERACTIVITY DISORDER (ADHD), COMBINED TYPE: ICD-10-CM

## 2024-08-06 PROCEDURE — G2211 COMPLEX E/M VISIT ADD ON: HCPCS | Mod: 95 | Performed by: NURSE PRACTITIONER

## 2024-08-06 PROCEDURE — 99214 OFFICE O/P EST MOD 30 MIN: CPT | Mod: 95 | Performed by: NURSE PRACTITIONER

## 2024-08-06 RX ORDER — LAMOTRIGINE 25 MG/1
TABLET ORAL
Qty: 90 TABLET | Refills: 1 | Status: SHIPPED | OUTPATIENT
Start: 2024-08-06

## 2024-08-06 RX ORDER — ATOMOXETINE 10 MG/1
20 CAPSULE ORAL DAILY
Qty: 180 CAPSULE | Refills: 0 | Status: SHIPPED | OUTPATIENT
Start: 2024-08-06

## 2024-08-06 RX ORDER — GABAPENTIN 600 MG/1
600 TABLET ORAL 3 TIMES DAILY PRN
Qty: 270 TABLET | Refills: 4 | Status: SHIPPED | OUTPATIENT
Start: 2024-08-06

## 2024-08-06 RX ORDER — VENLAFAXINE HYDROCHLORIDE 150 MG/1
150 CAPSULE, EXTENDED RELEASE ORAL DAILY
Qty: 90 CAPSULE | Refills: 1 | Status: SHIPPED | OUTPATIENT
Start: 2024-08-06

## 2024-08-06 RX ORDER — LAMOTRIGINE 200 MG/1
TABLET ORAL
Qty: 135 TABLET | Refills: 1 | Status: SHIPPED | OUTPATIENT
Start: 2024-08-06

## 2024-08-06 ASSESSMENT — PATIENT HEALTH QUESTIONNAIRE - PHQ9
10. IF YOU CHECKED OFF ANY PROBLEMS, HOW DIFFICULT HAVE THESE PROBLEMS MADE IT FOR YOU TO DO YOUR WORK, TAKE CARE OF THINGS AT HOME, OR GET ALONG WITH OTHER PEOPLE: SOMEWHAT DIFFICULT
SUM OF ALL RESPONSES TO PHQ QUESTIONS 1-9: 7
SUM OF ALL RESPONSES TO PHQ QUESTIONS 1-9: 7

## 2024-08-06 ASSESSMENT — PAIN SCALES - GENERAL: PAINLEVEL: NO PAIN (0)

## 2024-08-06 NOTE — PATIENT INSTRUCTIONS
-Continue on current medication regimen. Monitor blood pressure and if the readings are consistently over 140/90, please follow up with primary care.    Your next appointment is scheduled on 11/5/2024 (Tue) at 3:30pm.      **For crisis resources, please see the information at the end of this document**   Patient Education    Thank you for coming to the [facility] [place] [facility].     Lab Testing:  If you had lab testing today and your results are reassuring or normal they will be mailed to you or sent through [employer] within 7 days. If the lab tests need quick action we will call you with the results. The phone number we will call with results is # [phone]. If this is not the best number please call our clinic and change the number.     Medication Refills:  If you need any refills please call your pharmacy and they will contact us. Our fax number for refills is [phone].   Three business days of notice are needed for general medication refill requests.   Five business days of notice are needed for controlled substance refill requests.   If you need to change to a different pharmacy, please contact the new pharmacy directly. The new pharmacy will help you get your medications transferred.     Contact Us:  Please call [phone] during business hours (8-5:00 M-F).   If you have medication related questions after clinic hours, or on the weekend, please call [phone].     Financial Assistance [phone]   Medical Records [phone]       MENTAL HEALTH CRISIS RESOURCES:  For a emergency help, please call 911 or go to the nearest Emergency Department.     Emergency Walk-In Options:   EmPATH Unit @ [place] ([place]): [phone] - Specialized mental health emergency area designed to be calming  [facility] ([place]): [phone]  [facility] ([place]): [phone]  [facility]):  [phone]    [facility] Crisis Information:   [place]: [phone]  [place]: [phone]  [place] - Adult: [phone]     Child: [phone]  [place] - Adult: [phone]     Child: [phone]  [place]: [phone]  List of all [facility] resources:   https://[place].gov/dhs/people-we-serve/adults/health-care/mental-health/resources/crisis-contacts.jsp    National Crisis Information:   Crisis Text Line: Text  [place]  to 013960  Suicide & Crisis Lifeline: 988  National Suicide Prevention Lifeline: 5-496-828-TALK (1-668.364.2812)       For online chat options, visit https://suicidepreventionlifeline.org/chat/  Poison Control Center: 1-712.747.6042  Trans Lifeline: 1-239.503.6906 - Hotline for transgender people of all ages  The [employer] Project: 0-610-979-2214 - Hotline for LGBT youth     For Non-Emergency Support:   Fast Tracker: Mental Health & Substance Use Disorder Resources -   https://www.[employer]atn.org/

## 2024-08-06 NOTE — NURSING NOTE
Current patient location: [address] [place] [address]    Is the patient currently in the state of [place]? YES    Visit mode:VIDEO    If the visit is dropped, the patient can be reconnected by: VIDEO VISIT: Text to cell phone:   Telephone Information:   Mobile [phone]       Will anyone else be joining the visit? NO  (If patient encounters technical issues they should call [phone])    How would you like to obtain your AVS? MyChart    Are changes needed to the allergy or medication list? No    Are refills needed on medications prescribed by this physician? YES    Rooming Documentation:  Patient will complete questionnaire(s) in MyChart      Reason for visit: NINA TORRESF

## 2024-08-06 NOTE — PROGRESS NOTES
"Virtual Visit Details    Type of service:  Video Visit     Originating Location (pt. Location): Home  Distant Location (provider location):  Off-site  Platform used for Video Visit: [facility]    Psychiatry Clinic Progress Note                                                                  Patient Name: Parveen Kaplan  YOB: 1990  MRN: [id number]  Date of Service:  08/06/2024  Last Seen: 6/25/2024    Parveen Kaplan is a 34 year old person assigned female at birth, identifies as male who uses the name Omar and pronoun he.       Omar Kaplan is a 34 year old year old adult who is being evaluated via a billable telepsychiatry visit for ongoing psychiatric care. Omar Kaplan was last seen on 6/25/2024.    At that time,     Medication Ordered/Consults/Labs/tests Ordered:     Medication:   -Increase Strattera to 20 mg daily for ADHD. Monitor for yellowness of skin, white part of the eyes or itching.  -Continue all other medication regimen for now.  OTC Recommendations: none  Lab Orders:  none  Referrals: none  Release of Information: none  Future Treatment Considerations: Per symptoms.   Return for Follow Up: in 6 weeks      Pertinent Background:  Diagnoses include bipolar disorder, PTSD and AKIN.  Psych critical item history includes suicidal ideation, SIB [burning during HS], trauma hx and psych hosp (<3).      Previous medication trial: Strattera (10 mg effective, cost prohibitive), Buspar, Ativan, Clonidine (tremor), Seroquel (oversedation at 25mg, nausea, dizziness), Gabapentin (higher than 300 mg TID oversedation), Paxil (increased SI), Prazosin (oversedation), Remeron (not effective), Wellbutrin ('bad\"), Guanfacine (oversedation)     [All pronouns should read as \"he\"]     Hormone Care: BALAJI Reed  Therapist: [facility] (every other week)    Interim History                                                                                                        4, 4     Since the last " visit,  -Reports mood and anxiety continues to be well managed, denies SI, SIB or HI.  -Continues to take Gabapentin 600 mg in AM only with infrequent BID or TID use.  -Has not noticed much differences with increased Strattera for concentration. But wants to stay here longer while monitoring BP. Has not monitored BP, but has BP cuff at home.  -Continues to sleep well.    Denies any symptoms suggestive of hypomania or psychosis.    Current Suicidality/Hx of Suicide Attempts: Denies both  CoCominent Medical concerns:Denies    Medication Side Effects: The patient denies all medication side effects.      Medical Review of Systems     Apart from the symptoms mentioned int he HPI, the 14 point review of systems, including constitutional, HEENT, cardiovascular, respiratory, gastrointestinal, genitourinary, musculoskeletal, integumentary, endocrine, neurological, hematologic and allergic is entirely negative.    Pregnant: None. Nursing: None, Contraception: partner not producing sperm    Substance Use     -Stop drinking couple years ago.   -Denies any other substance use.      -Medical cannabis use daily.    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  Mostly virtual work.    Allergy                                Imitrex [sumatriptan]    Current Medications                                                                                                       Current Outpatient Medications   Medication Sig Dispense Refill    albuterol (PROAIR HFA/PROVENTIL HFA/VENTOLIN HFA) 108 (90 Base) MCG/ACT inhaler Inhale 2 puffs into the lungs every 6 hours 1 Inhaler 0    atomoxetine (STRATTERA) 10 MG capsule Take 2 capsules (20 mg) by  "mouth daily 60 capsule 1    benzoyl peroxide 5 % external liquid Apply topically daily 226 g 11    CANNABIDIOL PO       clindamycin (CLEOCIN T) 1 % external lotion Apply topically 2 times daily 60 mL 11    gabapentin (NEURONTIN) 600 MG tablet Take 1 tablet (600 mg) by mouth 3 times daily as needed (anxiety and sleep) 270 tablet 4    lamoTRIgine (LAMICTAL) 200 MG tablet TAKE 1 1/2 TABLETS BY MOUTH DAILY ALONG WITH 25 MG TABLET FOR TOTAL  MG DAILY. 135 tablet 1    lamoTRIgine (LAMICTAL) 25 MG tablet TAKE 1 TABLET BY MOUTH DAILY. TOGETHER WITH 1.5 TABLET OF 200MG TO MAKE TOTAL  MG DAILY 90 tablet 1    naloxone (NARCAN) 4 MG/0.1ML nasal spray Spray 1 spray (4 mg) into one nostril alternating nostrils as needed for opioid reversal every 2-3 minutes until assistance arrives 0.2 mL 11    ondansetron (ZOFRAN) 4 MG tablet Take 1 tablet (4 mg) by mouth every 8 hours as needed for nausea 12 tablet 0    testosterone (TESTOPEL) 75 MG subcutaneous implant pellet 825 mg by Subdermal route every 30 days      tretinoin (RETIN-A) 0.025 % external cream Apply topically At Bedtime Pea-sized amount to whole face 45 g 3    venlafaxine (EFFEXOR XR) 150 MG 24 hr capsule Take 1 capsule (150 mg) by mouth daily 90 capsule 1         Vitals                                                                                                                       3, 3   There were no vitals taken for this visit.        Mental Status Exam                                                                                   9, 14 cog      Alertness: alert  and oriented   Appearance: casually and appropriately groomed  Behavior/Demeanor: cooperative, pleasant and calm with good eye contact  Speech: regular rate and rhythm  Mood :  \"good \"  Affect:  euthymic ; was congruent to mood; was congruent to content  Thought Process (Associations):  Logical, Linear and Goal directed  Thought process (Rate):  Normal  Thought content:  no overt psychosis, " denies suicidal ideation, intent or thoughts and patient does not appear to be responding to internal stimuli  Perception:  Reports none;  Denies auditory hallucinations and visual hallucinations  Attention/Concentration:  Normal  Memory:  Immediate recall intact and Short-term memory intact  Language: intact  Fund of Knowledge/Intelligence:  Average  Abstraction:  Normal  Insight:  Good  Judgment:  Good  Cognition: (6) does  appear grossly intact; formal cognitive testing was not done      Physical Exam     Motor activity/EPS:  normal  Psychomotor: normal or unremarkable      Labs and Results      Pertinent findings on review include: Review of records with relevant information reported in the HPI.  Reviewed pt's past medical record and obtained collateral information.      [place] PRESCRIPTION MONITORING PROGRAM [] was checked today:  No refills since last seen.          9/6/2023     3:29 PM 12/28/2023    11:32 AM 6/25/2024    11:50 AM   PHQ   PHQ-9 Total Score 6 7 5   Q9: Thoughts of better off dead/self-harm past 2 weeks Not at all Not at all Not at all       AKIN 7 Today: N/A      7/8/2020     8:39 AM 2/3/2021     8:51 AM 6/7/2022     8:51 AM   AKIN-7 SCORE   Total Score  7 (mild anxiety) 12 (moderate anxiety)   Total Score 13 7 12       Recent Labs   Lab Test 04/22/22  1322 04/12/21  1329   CR 1.06 1.10*   GFRESTIMATED >90 68     6/26/2023: Cr 1.03, eGFR 100    Recent Labs   Lab Test 04/22/22  1322 05/10/21  1307 04/12/21  1329   AST 31 36 45   ALT 55* 92* 107*   ALKPHOS 80  --  102     3/21/2024: ALT 79  6/6/2023: AST 38, ALT 88  3/31/2021: ALT 89,   6/8/2020: ALT 82,   6/1/2020: ALT 81,   11/19/2020 ALT 63,   1/13/2019: ALT 22     Vitamin D 24 (4/12/2021)     PSYCHOTROPIC DRUG INTERACTIONS:    no     MANAGEMENT: N/A    Impression/Assessment      Omar Kaplan is a 34 year old adult  who presents for med management follow up.  Pt appears mostly stable in his mood and anxiety, denies SI, SIB or HI during the  appointment. Pt has not noticed significant improvement in ADHD sxs with increased Strattera, but wants to stay on current medication regimen while monitoring BP closely. Pt is also following up with hepatology, denies any yellowness of skin, sclera or itching. OK to continue on current medication regimen.    Diagnosis                                                                   Bipolar 2 disorder  PTSD  AKIN  ADHD    Treatment Recommendation & Plan       Medication Ordered/Consults/Labs/tests Ordered:     Medication: Continue on current medication regimen. Monitor blood pressure and if the readings are consistently over 140/90, please follow up with primary care.  OTC Recommendations: none  Lab Orders:  none  Referrals: none  Release of Information: none  Future Treatment Considerations: Per symptoms.   Return for Follow Up: in 3 months per pt's request    -Discussed safety plan for suicidal thoughts  -Discussed plan for suicidality  -Discussed available emergency services  -Patient agrees with the treatment plan  -Encouraged to continue outpatient therapy to gain more coping mechanism for stress.    Treatment Risk Statement: Discussed with the patient my impressions, as well as recommended studies. I educated patient on the differential diagnosis and prognosis. I discussed with the patient the risks and benefits of medications versus no interventions, including efficacy, dose, possible side effects and length of treatment and the importance of medication compliance.  The patient understands the risks, benefits, adverse effects and alternatives. Agrees to treatment with the capacity to do so. No medical contraindications to treatment. The patient also understands the risks of using street drugs or alcohol.    CRISIS NUMBERS:   Provided routinely in AVS.    Diagnosis or treatment significantly limited by social determinants of health.      The longitudinal plan of care for the diagnosis(es)/condition(s) as  documented were addressed during this visit. Due to the added complexity in care, I will continue to support Miles in the subsequent management and with ongoing continuity of care.    Dayan Blackmon CNP,  08/06/2024

## 2024-10-23 NOTE — PATIENT INSTRUCTIONS
-Decrease Effexor to 225 mg daily. Monitor anxiety and mood closely and if it's getting worse, please contact luis as soon as possible.  -Continue all other medication regimen.    Your next appointment is scheduled on 9/6/2023 (Wed) at 3:30pm.      **For crisis resources, please see the information at the end of this document**   Patient Education    Thank you for coming to the [facility] [place] [facility].     Lab Testing:  If you had lab testing today and your results are reassuring or normal they will be mailed to you or sent through [employer] within 7 days. If the lab tests need quick action we will call you with the results. The phone number we will call with results is # [phone]. If this is not the best number please call our clinic and change the number.     Medication Refills:  If you need any refills please call your pharmacy and they will contact us. Our fax number for refills is [phone].   Three business days of notice are needed for general medication refill requests.   Five business days of notice are needed for controlled substance refill requests.   If you need to change to a different pharmacy, please contact the new pharmacy directly. The new pharmacy will help you get your medications transferred.     Contact Us:  Please call [phone] during business hours (8-5:00 M-F).   If you have medication related questions after clinic hours, or on the weekend, please call [phone].     Financial Assistance [phone]   Medical Records [phone]       MENTAL HEALTH CRISIS RESOURCES:  For a emergency help, please call 911 or go to the nearest Emergency Department.     Emergency Walk-In Options:   EmPATH Unit @ [place] ([place]): [phone] - Specialized mental health emergency area designed to be calming  [facility] ([place]): [phone]  [facility] ([place]): [phone]  [facility]  TRANSFER - OUT REPORT:    Verbal report given to BLANCA Choi on Mani Estrella  being transferred to Bed 563/01 for routine progression of patient care       Report consisted of patient's Situation, Background, Assessment and   Recommendations(SBAR).     Information from the following report(s) Nurse Handoff Report, Index, ED Encounter Summary, ED SBAR, Adult Overview, MAR, Recent Results, Neuro Assessment, and Event Log was reviewed with the receiving nurse.    [place] Fall Assessment:    Presents to emergency department  because of falls (Syncope, seizure, or loss of consciousness): No  Age > 70: Yes  Altered Mental Status, Intoxication with alcohol or substance confusion (Disorientation, impaired judgment, poor safety awaremess, or inability to follow instructions): Yes  Impaired Mobility: Ambulates or transfers with assistive devices or assistance; Unable to ambulate or transer.: No  Nursing Judgement: Yes          Lines:   Peripheral IV 10/22/24 Left Antecubital (Active)        Opportunity for questions and clarification was provided.      Patient transported with:  Tech and PD        Center ([place]): [phone]    [facility] Crisis Information:   [place]: [phone]  [place]: [phone]  [place] ([place]) - Adult: [phone]     Child: [phone]  [place] - Adult: [phone]     Child: [phone]  [place]: [phone]  List of all [facility] resources:   https://[place].[facility]/dhs/people-we-serve/adults/health-care/mental-health/resources/crisis-contacts.jsp    National Crisis Information:   Crisis Text Line: Text  [place]  to 996410  Suicide & Crisis Lifeline: 988  National Suicide Prevention Lifeline: 0-623-970-SYAO (1-423.660.6897)       For online chat options, visit https://suicidepreventionlifeline.org/chat/  Poison Control Center: 1-444.269.2865  Trans Lifeline: 1-820.806.2217 - Hotline for transgender people of all ages  The [employer] Project: 0-247-874-0621 - Hotline for LGBT youth     For Non-Emergency Support:   Fast Tracker: Mental Health & Substance Use Disorder Resources -   https://www.[employer].org/

## 2024-11-13 ENCOUNTER — VIRTUAL VISIT (OUTPATIENT)
Dept: PSYCHIATRY | Facility: CLINIC | Age: 34
End: 2024-11-13
Attending: NURSE PRACTITIONER
Payer: COMMERCIAL

## 2024-11-13 DIAGNOSIS — F41.0 PANIC ATTACKS: ICD-10-CM

## 2024-11-13 DIAGNOSIS — F31.31 BIPOLAR AFFECTIVE DISORDER, CURRENTLY DEPRESSED, MILD (H): Primary | ICD-10-CM

## 2024-11-13 RX ORDER — LAMOTRIGINE 25 MG/1
TABLET ORAL
Qty: 90 TABLET | Refills: 1 | Status: SHIPPED | OUTPATIENT
Start: 2024-11-13

## 2024-11-13 RX ORDER — LAMOTRIGINE 200 MG/1
TABLET ORAL
Qty: 135 TABLET | Refills: 1 | Status: SHIPPED | OUTPATIENT
Start: 2024-11-13

## 2024-11-13 RX ORDER — GABAPENTIN 600 MG/1
600 TABLET ORAL 3 TIMES DAILY PRN
Qty: 270 TABLET | Refills: 5 | Status: SHIPPED | OUTPATIENT
Start: 2024-11-13

## 2024-11-13 RX ORDER — VENLAFAXINE HYDROCHLORIDE 150 MG/1
150 CAPSULE, EXTENDED RELEASE ORAL DAILY
Qty: 90 CAPSULE | Refills: 1 | Status: SHIPPED | OUTPATIENT
Start: 2024-11-13

## 2024-11-13 ASSESSMENT — PAIN SCALES - GENERAL: PAINLEVEL_OUTOF10: MODERATE PAIN (4)

## 2024-11-13 NOTE — PATIENT INSTRUCTIONS
-Strattera is discontinued as you are not taking the medication.  -Continue all other medication regimen.    Your next appointment is scheduled on 5/14/2025 (Wed) at 3:30pm.      **For crisis resources, please see the information at the end of this document**   Patient Education    Thank you for coming to the [facility] [place] [facility].     Lab Testing:  If you had lab testing today and your results are reassuring or normal they will be mailed to you or sent through [employer] within 7 days. If the lab tests need quick action we will call you with the results. The phone number we will call with results is # [phone]. If this is not the best number please call our clinic and change the number.     Medication Refills:  If you need any refills please call your pharmacy and they will contact us. Our fax number for refills is [phone].   Three business days of notice are needed for general medication refill requests.   Five business days of notice are needed for controlled substance refill requests.   If you need to change to a different pharmacy, please contact the new pharmacy directly. The new pharmacy will help you get your medications transferred.     Contact Us:  Please call [phone] during business hours (8-5:00 M-F).   If you have medication related questions after clinic hours, or on the weekend, please call [phone].     Financial Assistance [phone]   Medical Records [phone]       MENTAL HEALTH CRISIS RESOURCES:  For a emergency help, please call 911 or go to the nearest Emergency Department.     Emergency Walk-In Options:   EmPATH Unit @ [place] ([place]): [phone] - Specialized mental health emergency area designed to be calming  [facility] ([place]): [phone]  [facility] ([place]): [phone]  [facility] ([place]): [phone]    [facility] Crisis Information:   [place]:  [phone]  [place]: [phone]  [place] ([place]) - Adult: [phone]     Child: [phone]  [place] - Adult: [phone]     Child: [phone]  [place]: [phone]  List of all [facility] resources:   [place]://[place].gov[place]dhs/people-we-serve/adults/health-care/mental-health/resources/crisis-contacts.jsp    National Crisis Information:   Crisis Text Line: Text  [place]  to 391177  Suicide & Crisis Lifeline: 988  National Suicide Prevention Lifeline: 1-625-203-TALK (1-444.609.5392)       For online chat options, visit https://suicidepreventionlifeline.org/chat/  Poison Control Center: 8-517-614-3076  Trans Lifeline: 1-804.441.2075 - Hotline for transgender people of all ages  The [employer] Project: 6-312-901-2439 - Hotline for LGBT youth     For Non-Emergency Support:   Fast Tracker: Mental Health & Substance Use Disorder Resources -   https://www.[employer].org/

## 2024-11-13 NOTE — NURSING NOTE
Current patient location: [address] [place] [address]    Is the patient currently in the state of [place]? YES    Visit mode:VIDEO    If the visit is dropped, the patient can be reconnected by:VIDEO VISIT: Text to cell phone:   Telephone Information:   Mobile [phone]       Will anyone else be joining the visit? NO  (If patient encounters technical issues they should call [phone])    Are changes needed to the allergy or medication list? No    Are refills needed on medications prescribed by this physician? Discuss with provider    Rooming Documentation:  Questionnaire(s) completed    Reason for visit: RECHECK    Suzi NAGY
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
## 2025-05-09 DIAGNOSIS — F41.0 PANIC ATTACKS: ICD-10-CM

## 2025-05-09 DIAGNOSIS — F31.31 BIPOLAR AFFECTIVE DISORDER, CURRENTLY DEPRESSED, MILD (H): ICD-10-CM

## 2025-05-12 RX ORDER — VENLAFAXINE HYDROCHLORIDE 150 MG/1
150 CAPSULE, EXTENDED RELEASE ORAL DAILY
Qty: 30 CAPSULE | Refills: 0 | Status: SHIPPED | OUTPATIENT
Start: 2025-05-12 | End: 2025-05-14

## 2025-05-12 RX ORDER — LAMOTRIGINE 200 MG/1
TABLET ORAL
Qty: 45 TABLET | Refills: 0 | Status: SHIPPED | OUTPATIENT
Start: 2025-05-12 | End: 2025-05-14

## 2025-05-12 RX ORDER — LAMOTRIGINE 25 MG/1
TABLET ORAL
Qty: 30 TABLET | Refills: 0 | Status: SHIPPED | OUTPATIENT
Start: 2025-05-12 | End: 2025-05-14

## 2025-05-12 NOTE — TELEPHONE ENCOUNTER
"Date of Last Office Visit: 11/13/24  RTC: 5/14/2025 (Wed) at 3:30pm.   No shows: 0  Cancellations: 0  Date of Next Office Visit: 5/14/2025 (Wed) at 3:30pm.    ------------------------------    Last Script Details::    venlafaxine (EFFEXOR XR) 150 MG 24 hr capsule 90 capsule 1 11/13/2024     lamoTRIgine (LAMICTAL) 200 MG tablet 135 tablet 1 11/13/2024     lamoTRIgine (LAMICTAL) 25 MG tablet 90 tablet 1 11/13/2024     ------------------------------  From last visit note:   \"-Continue all other medication regimen. \"      Refill Decision:    [x]Medication refilled per  Medication Refill in Ambulatory Care  policy.             Request from pharmacy:  Requested Prescriptions   Pending Prescriptions Disp Refills    lamoTRIgine (LAMICTAL) 25 MG tablet [Pharmacy Med Name: LAMOTRIGINE 25MG TABLETS] 90 tablet 1     Sig: TAKE 1 TABLET BY MOUTH DAILY. TOGETHER WITH 1.5 TABLET  MG TO. MAKE TOTAL  MG DAILY       Anti-Seizure Meds Protocol  Failed - 5/12/2025 12:57 PM        Failed - PHQ-9 score less than 5 in past 6 months.     Please review last PHQ-9 score.       6/25/2024    11:50 AM 8/6/2024     2:57 PM 11/13/2024     2:10 PM   PHQ-9 SCORE   PHQ-9 Total Score MyChart 5 (Mild depression) 7 (Mild depression) 5 (Mild depression)   PHQ-9 Total Score 5 7 5        Patient-reported             Failed - Review Authorizing provider's last note.      Refer to last progress notes: confirm request is for original authorizing provider (cannot be through other providers).          Failed - Medication is active on med list and the sig matches. RN to manually verify dose and sig if red X/fail.     If the protocol passes (green check), you do not need to verify med dose and sig.    A prescription matches if they are the same clinical intention.    For Example: once daily and every morning are the same.    The protocol can not identify upper and lower case letters as matching and will fail.     For Example: Take 1 tablet (50 mg) by " mouth daily     TAKE 1 TABLET (50 MG) BY MOUTH DAILY    For all fails (red x), verify dose and sig.    If the refill does match what is on file, the RN can still proceed to approve the refill request.       If they do not match, route to the appropriate provider.             Failed - AKIN-7 score of less than 5 in past 6 months.     Please review last AKIN-7 score.       7/8/2020     8:39 AM 2/3/2021     8:51 AM 6/7/2022     8:51 AM   AKIN-7 SCORE   Total Score  7 (mild anxiety) 12 (moderate anxiety)   Total Score 13 7 12             Failed - Has GFR on file in past 12 months and most recent value is normal        Passed - Normal ALT or AST on file in past 26 months     Recent Labs   Lab Test 04/22/22  1322   ALT 55*     Recent Labs   Lab Test 04/22/22  1322   AST 31             Passed - Recent (12 mo) or future (90 days) visit within the authorizing provider's specialty     The patient must have completed an in-person or virtual visit within the past 12 months or has a future visit scheduled within the next 90 days with the authorizing provider s specialty.  Urgent care and e-visits do not qualify as an office visit for this protocol.          Passed - Medication indicated for associated diagnosis     Medication is associated with one or more of the following diagnoses:     Bipolar   Dementia   Depression   Epilepsy   Migraine   Seizure   Trigeminal Neuralgia   Cyclothymia            venlafaxine (EFFEXOR XR) 150 MG 24 hr capsule [Pharmacy Med Name: VENLAFAXINE ER 150MG CAPSULES] 90 capsule 1     Sig: TAKE 1 CAPSULE(150 MG) BY MOUTH DAILY       Serotonin-Norepinephrine Reuptake Inhibitors  Failed - 5/12/2025 12:57 PM        Failed - Most recent blood pressure under 140/90 in past 12 months     BP Readings from Last 3 Encounters:   04/22/22 137/65   05/13/20 111/78   02/28/20 122/80       No data recorded            Failed - PHQ-9 score of less than 5 in past 6 months     Please review last PHQ-9 score.       6/25/2024     11:50 AM 8/6/2024     2:57 PM 11/13/2024     2:10 PM   PHQ-9 SCORE   PHQ-9 Total Score MyChart 5 (Mild depression) 7 (Mild depression) 5 (Mild depression)   PHQ-9 Total Score 5 7 5        Patient-reported             Failed - AKIN-7 score on file during last 12 months        Passed - Medication is active on med list and the sig matches. RN to manually verify dose and sig if red X/fail.     If the protocol passes (green check), you do not need to verify med dose and sig.    A prescription matches if they are the same clinical intention.    For Example: once daily and every morning are the same.    The protocol can not identify upper and lower case letters as matching and will fail.     For Example: Take 1 tablet (50 mg) by mouth daily     TAKE 1 TABLET (50 MG) BY MOUTH DAILY    For all fails (red x), verify dose and sig.    If the refill does match what is on file, the RN can still proceed to approve the refill request.       If they do not match, route to the appropriate provider.             Passed - Recent (12 mo) or future (90 days) visit within the authorizing provider's specialty     The patient must have completed an in-person or virtual visit within the past 12 months or has a future visit scheduled within the next 90 days with the authorizing provider s specialty.  Urgent care and e-visits do not qualify as an office visit for this protocol.          Passed - Medication indicated for associated diagnosis     Medication is associated with one or more of the following diagnoses:  Anxiety  Bipolar  Chronic musculoskeletal pain  Depression  Fibromyalgia  Headache  Migraine  Neuropathy  Obsessive compulsive disorder  Panic disorder  Social phobia  Mood disorder  Menopause  Hot flashes/Menopausal flushing  Fibromyitis  Flushing          Passed - Patient is age 18 or older          lamoTRIgine (LAMICTAL) 200 MG tablet [Pharmacy Med Name: LAMOTRIGINE 200MG TABLETS] 135 tablet 1     Sig: TAKE 1 AND ONE-HALF TABLET  BY MOUTH DAILY ALONG WITH 25 MG TABLET FOR TOTAL DAILY DOSE  MG       Anti-Seizure Meds Protocol  Failed - 5/12/2025 12:57 PM        Failed - PHQ-9 score less than 5 in past 6 months.     Please review last PHQ-9 score.       6/25/2024    11:50 AM 8/6/2024     2:57 PM 11/13/2024     2:10 PM   PHQ-9 SCORE   PHQ-9 Total Score MyChart 5 (Mild depression) 7 (Mild depression) 5 (Mild depression)   PHQ-9 Total Score 5 7 5        Patient-reported             Failed - Review Authorizing provider's last note.      Refer to last progress notes: confirm request is for original authorizing provider (cannot be through other providers).          Failed - Medication is active on med list and the sig matches. RN to manually verify dose and sig if red X/fail.     If the protocol passes (green check), you do not need to verify med dose and sig.    A prescription matches if they are the same clinical intention.    For Example: once daily and every morning are the same.    The protocol can not identify upper and lower case letters as matching and will fail.     For Example: Take 1 tablet (50 mg) by mouth daily     TAKE 1 TABLET (50 MG) BY MOUTH DAILY    For all fails (red x), verify dose and sig.    If the refill does match what is on file, the RN can still proceed to approve the refill request.       If they do not match, route to the appropriate provider.             Failed - AKIN-7 score of less than 5 in past 6 months.     Please review last AKIN-7 score.       7/8/2020     8:39 AM 2/3/2021     8:51 AM 6/7/2022     8:51 AM   AKIN-7 SCORE   Total Score  7 (mild anxiety) 12 (moderate anxiety)   Total Score 13 7 12             Failed - Has GFR on file in past 12 months and most recent value is normal        Passed - Normal ALT or AST on file in past 26 months     Recent Labs   Lab Test 04/22/22  1322   ALT 55*     Recent Labs   Lab Test 04/22/22  1322   AST 31             Passed - Recent (12 mo) or future (90 days) visit within the  authorizing provider's specialty     The patient must have completed an in-person or virtual visit within the past 12 months or has a future visit scheduled within the next 90 days with the authorizing provider s specialty.  Urgent care and e-visits do not qualify as an office visit for this protocol.          Passed - Medication indicated for associated diagnosis     Medication is associated with one or more of the following diagnoses:     Bipolar   Dementia   Depression   Epilepsy   Migraine   Seizure   Trigeminal Neuralgia   Cyclothymia

## 2025-05-13 ASSESSMENT — ANXIETY QUESTIONNAIRES
8. IF YOU CHECKED OFF ANY PROBLEMS, HOW DIFFICULT HAVE THESE MADE IT FOR YOU TO DO YOUR WORK, TAKE CARE OF THINGS AT HOME, OR GET ALONG WITH OTHER PEOPLE?: VERY DIFFICULT
1. FEELING NERVOUS, ANXIOUS, OR ON EDGE: NEARLY EVERY DAY
5. BEING SO RESTLESS THAT IT IS HARD TO SIT STILL: MORE THAN HALF THE DAYS
IF YOU CHECKED OFF ANY PROBLEMS ON THIS QUESTIONNAIRE, HOW DIFFICULT HAVE THESE PROBLEMS MADE IT FOR YOU TO DO YOUR WORK, TAKE CARE OF THINGS AT HOME, OR GET ALONG WITH OTHER PEOPLE: VERY DIFFICULT
GAD7 TOTAL SCORE: 16
7. FEELING AFRAID AS IF SOMETHING AWFUL MIGHT HAPPEN: SEVERAL DAYS
3. WORRYING TOO MUCH ABOUT DIFFERENT THINGS: MORE THAN HALF THE DAYS
4. TROUBLE RELAXING: NEARLY EVERY DAY
6. BECOMING EASILY ANNOYED OR IRRITABLE: NEARLY EVERY DAY
2. NOT BEING ABLE TO STOP OR CONTROL WORRYING: MORE THAN HALF THE DAYS
GAD7 TOTAL SCORE: 16
GAD7 TOTAL SCORE: 16
7. FEELING AFRAID AS IF SOMETHING AWFUL MIGHT HAPPEN: SEVERAL DAYS

## 2025-05-14 ENCOUNTER — VIRTUAL VISIT (OUTPATIENT)
Dept: PSYCHIATRY | Facility: CLINIC | Age: 35
End: 2025-05-14
Attending: NURSE PRACTITIONER
Payer: COMMERCIAL

## 2025-05-14 DIAGNOSIS — F41.0 PANIC ATTACKS: ICD-10-CM

## 2025-05-14 DIAGNOSIS — F31.31 BIPOLAR AFFECTIVE DISORDER, CURRENTLY DEPRESSED, MILD (H): ICD-10-CM

## 2025-05-14 DIAGNOSIS — F41.1 GAD (GENERALIZED ANXIETY DISORDER): Primary | ICD-10-CM

## 2025-05-14 RX ORDER — LAMOTRIGINE 25 MG/1
TABLET ORAL
Qty: 90 TABLET | Refills: 1 | Status: SHIPPED | OUTPATIENT
Start: 2025-05-14

## 2025-05-14 RX ORDER — LAMOTRIGINE 200 MG/1
TABLET ORAL
Qty: 135 TABLET | Refills: 1 | Status: SHIPPED | OUTPATIENT
Start: 2025-05-14

## 2025-05-14 RX ORDER — VENLAFAXINE HYDROCHLORIDE 150 MG/1
150 CAPSULE, EXTENDED RELEASE ORAL DAILY
Qty: 90 CAPSULE | Refills: 1 | Status: SHIPPED | OUTPATIENT
Start: 2025-05-14

## 2025-05-14 RX ORDER — GABAPENTIN 600 MG/1
600 TABLET ORAL 3 TIMES DAILY PRN
Qty: 270 TABLET | Refills: 5 | Status: SHIPPED | OUTPATIENT
Start: 2025-05-14

## 2025-05-14 ASSESSMENT — PAIN SCALES - GENERAL: PAINLEVEL_OUTOF10: NO PAIN (0)

## 2025-05-14 NOTE — PATIENT INSTRUCTIONS
-Continue on current medication regimen. Monitor your mood and sleep closely. If not feeling well, please contact luis.    POTS  -Compression gear (Torso and legs, if possible, also get arm compression gear as well)  -Discuss possible trial of Metoprolol with cardiologist to help with POTS, migraine prevention. Inform them you have asthma and likely can't try Propranolol.    Pls send the list and Santana's father's address via [employer].    Your next appointment is scheduled on 7/23/2025 (wed) at 3pm.      **For crisis resources, please see the information at the end of this document**   Patient Education    Thank you for coming to the [facility] [place] [facility].     Lab Testing:  If you had lab testing today and your results are reassuring or normal they will be mailed to you or sent through [employer] within 7 days. If the lab tests need quick action we will call you with the results. The phone number we will call with results is # [phone]. If this is not the best number please call our clinic and change the number.     Medication Refills:  If you need any refills please call your pharmacy and they will contact us. Our fax number for refills is [phone].   Three business days of notice are needed for general medication refill requests.   Five business days of notice are needed for controlled substance refill requests.   If you need to change to a different pharmacy, please contact the new pharmacy directly. The new pharmacy will help you get your medications transferred.     Contact Us:  Please call [phone] during business hours (8-5:00 M-F).   If you have medication related questions after clinic hours, or on the weekend, please call [phone].     Financial Assistance [phone]   Medical Records [phone]       MENTAL HEALTH CRISIS RESOURCES:  For a emergency help, please call 911 or go to the nearest Emergency Department.     Emergency Walk-In Options:    [facility] ([place]): [phone] - Specialized mental health emergency area designed to be calming  [facility] ([place]): [phone]  [facility] ([place]): [phone]  [facility] ([place]): [phone]    County Crisis Information:   [place]: [phone]  [place]: [phone]  [place] - Adult: [phone]     [place]: [phone]  [place] - Adult: [phone]     Child: [phone]  [place]: [phone]  List of all [facility] resources:   [email or web address]-[email or web address]/resources/crisis-contacts.jsp    National Crisis Information:   Crisis Text Line: Text  [place]  to 939653  Suicide & Crisis Lifeline: 988  National Suicide Prevention Lifeline: 6-482-458-DJVV (1-408.607.6979)       For online chat options, visit https://suicidepreventionlifeline.org/chat/  Poison Control Center: 1-989.505.4487  Trans Lifeline: 1-510.363.5631 - Hotline for transgender people of all ages  [employer]: 7-867-453-7498 - Hotline for LGBT youth     For Non-Emergency Support:   Fast Tracker: Mental Health & Substance Use Disorder Resources -   https://www.[employer].org/

## 2025-05-14 NOTE — PROGRESS NOTES
"Virtual Visit Details    Type of service:  Video Visit     Originating Location (pt. Location): Home  Distant Location (provider location):  Off-site  Platform used for Video Visit: [facility]    Psychiatry Clinic Progress Note                                                                  Patient Name: Parveen Kaplan  YOB: 1990  MRN: [id number]  Date of Service:  2025  Last Seen: 2024    Parveen Kaplan is a 34 year old person assigned female at birth, identifies as male who uses the name Omar and pronoun he.       Omar Kaplan is a 34 year old year old adult who is being evaluated via a billable telepsychiatry visit for ongoing psychiatric care. Omar Kaplan was last seen on 2024.    At that time,     Medication Ordered/Consults/Labs/tests Ordered:     Medication:   -Strattera is discontinued as you are not taking the medication.  -Continue all other medication regimen.  OTC Recommendations: none  Lab Orders:  none  Referrals: none  Release of Information: none  Future Treatment Considerations: Per symptoms.   Return for Follow Up: in 6 months per pt's request    Pertinent Background:  Diagnoses include bipolar disorder, PTSD and AKIN.  Psych critical item history includes suicidal ideation, SIB [burning during HS], trauma hx and psych hosp (<3).      Previous medication trial: Strattera (10 mg effective, cost prohibitive), Buspar, Ativan, Clonidine (tremor), Seroquel (oversedation at 25mg, nausea, dizziness), Gabapentin (higher than 300 mg TID oversedation), Paxil (increased SI), Prazosin (oversedation), Remeron (not effective), Wellbutrin ('bad\"), Guanfacine (oversedation)     [All pronouns should read as \"he\"]     Therapist: [facility] (every other week)    Interim History                                                                                                        4, 4     Since the last visit,  -Mother  on 2025. She was not doing well, but health " deteriorated, was in hospital and hospice. Pt was able to spend time with her during her final days. Feels supported by Santana, but 2 other people who he thought was going to be a support wasn't able to support him during the process and disappointed.   -Also uncle, father figure to pt, a sibling of mother is not doing well. Is out of the hospital, but was hospitalized 1 week after mom's hospitalization.  -It will be difficult May as this is the 1st birthday without her and also her memorial is scheduled on 5/24.  -Moving at the beginning of July. Found a great apartment. But also plan to purchase a house 7/2026. Have saved enough money to purchase a house with [employer]. Excited about this, but sad that mom would not be able to see a new apartment nor house. Was planning to have ceremony with Santana when purchased a house. Feels more than what budgeted, but feels able to eat out significantly less now that he would have an access to kitchen.  -Also moving to a new team at work. Not taking class this semester.   -Has been dissociative even prior to mother's passing/illness. This delayed getting medical attention to his likely POTS sxs. Just completed 2 weeks of cardiac monitoring before proceed towards tilt test. Having salt in coffee is very helpful. Currently has not started compression garment.  -Started Effexor at age 19 originally for migraine prevention, but dizziness started early teen. Unsure if Strattera discontinuation made difference in POTS sxs.  -Has asthma.   -Sleep fluctuates after mom's death.  -Mood and anxiety are manageable enough for now, denies SI, SIB or HI.   -wants to continue on current medication regimen and reassess in July after the move. Knows that May would be difficult.    Denies any symptoms suggestive of hypomania or psychosis.    Current Suicidality/Hx of Suicide Attempts: Denies both  CoCominent Medical concerns: dizziness    Medication Side Effects: The patient  denies all medication side effects.      Medical Review of Systems     Apart from the symptoms mentioned int he HPI, the 14 point review of systems, including constitutional, HEENT, cardiovascular, respiratory, gastrointestinal, genitourinary, musculoskeletal, integumentary, endocrine, neurological, hematologic and allergic is entirely negative except dizziness.    Pregnant: None. Nursing: None, Contraception: hysterectomy with salpingectomy (11/2024),  partner not producing sperm    Substance Use     -Stop drinking couple years ago.   -Denies any other substance use.      -Medical cannabis use daily.    Social/ Family History                                  [per patient report]                                 1ea,1ea     -Living arrangements: living with partner in his parent's home and feels safe. Will move to apartment with Santana in July 2025. Plan to purchase a house in July 2026.  -Social Support: fiancee, family  -Access to gun: denies  -Working FT at insurance company, providing quality improvement.  Mostly virtual work. Not taking class this semester.    Allergy                                Imitrex [sumatriptan]    Current Medications                                                                                                       Current Outpatient Medications   Medication Sig Dispense Refill    albuterol (PROAIR HFA/PROVENTIL HFA/VENTOLIN HFA) 108 (90 Base) MCG/ACT inhaler Inhale 2 puffs into the lungs every 6 hours 1 Inhaler 0    benzoyl peroxide 5 % external liquid Apply topically daily 226 g 11    CANNABIDIOL PO       clindamycin (CLEOCIN T) 1 % external lotion Apply topically 2 times daily 60 mL 11    gabapentin (NEURONTIN) 600 MG tablet Take 1 tablet (600 mg) by mouth 3 times daily as needed (anxiety and sleep). 270 tablet 5    lamoTRIgine (LAMICTAL) 200 MG tablet TAKE 1 AND ONE-HALF TABLET BY MOUTH DAILY ALONG WITH 25 MG TABLET FOR TOTAL DAILY DOSE  MG 45 tablet 0    lamoTRIgine  "(LAMICTAL) 25 MG tablet TAKE 1 TABLET BY MOUTH DAILY. TOGETHER WITH 1.5 TABLET  MG TO. MAKE TOTAL  MG DAILY 30 tablet 0    naloxone (NARCAN) 4 MG/0.1ML nasal spray Spray 1 spray (4 mg) into one nostril alternating nostrils as needed for opioid reversal every 2-3 minutes until assistance arrives 0.2 mL 11    ondansetron (ZOFRAN) 4 MG tablet Take 1 tablet (4 mg) by mouth every 8 hours as needed for nausea 12 tablet 0    testosterone (TESTOPEL) 75 MG subcutaneous implant pellet 825 mg by Subdermal route every 30 days      tretinoin (RETIN-A) 0.025 % external cream Apply topically At Bedtime Pea-sized amount to whole face 45 g 3    venlafaxine (EFFEXOR XR) 150 MG 24 hr capsule Take 1 capsule (150 mg) by mouth daily. 30 capsule 0         Vitals                                                                                                                       3, 3   There were no vitals taken for this visit.        Mental Status Exam                                                                                   9, 14 cog      Alertness: alert  and oriented   Appearance: casually and appropriately groomed  Behavior/Demeanor: cooperative, pleasant and calm with good eye contact, tearful when discussing loss of mother.  Speech: regular rate and rhythm  Mood :  \"ok \"  Affect: somewhat euthymic, tearful when discussing loss of mother; was congruent to mood; was congruent to content  Thought Process (Associations):  Logical, Linear and Goal directed  Thought process (Rate):  Normal  Thought content:  no overt psychosis, denies suicidal ideation, intent or thoughts and patient does not appear to be responding to internal stimuli  Perception:  Reports none;  Denies auditory hallucinations and visual hallucinations  Attention/Concentration:  Normal  Memory:  Immediate recall intact and Short-term memory intact  Language: intact  Fund of Knowledge/Intelligence:  Average  Abstraction:  Normal  Insight:  " Good  Judgment:  Good  Cognition: (6) does  appear grossly intact; formal cognitive testing was not done      Physical Exam     Motor activity/EPS:  normal  Psychomotor: normal or unremarkable      Labs and Results      Pertinent findings on review include: Review of records with relevant information reported in the HPI.  Reviewed pt's past medical record and obtained collateral information.      [place] PRESCRIPTION MONITORING PROGRAM [] was checked today:        Answers submitted by the patient for this visit:  Patient Health Questionnaire (Submitted on 5/13/2025)  If you checked off any problems, how difficult have these problems made it for you to do your work, take care of things at home, or get along with other people?: Somewhat difficult  PHQ9 TOTAL SCORE: 7  Patient Health Questionnaire (G7) (Submitted on 5/13/2025)  AKIN 7 TOTAL SCORE: 16          8/6/2024     2:57 PM 11/13/2024     2:10 PM 5/13/2025     3:58 PM   PHQ   PHQ-9 Total Score 7 5  7    Q9: Thoughts of better off dead/self-harm past 2 weeks Not at all  Not at all Not at all       Patient-reported    Proxy-reported       AKIN 7 Today: N/A      2/3/2021     8:51 AM 6/7/2022     8:51 AM 5/13/2025     3:59 PM   AKIN-7 SCORE   Total Score 7 (mild anxiety) 12 (moderate anxiety) 16 (severe anxiety)   Total Score 7 12 16        Patient-reported       Recent Labs   Lab Test 04/22/22  1322 04/12/21  1329   CR 1.06 1.10*   GFRESTIMATED >90 68     Cr 1.02, eGFR> 60 (4/17/2025)  Cr 1.10, eGFR >60 (11/4/2024)  Cr 1.03, eGFR 100 (6/26/2023)      Recent Labs   Lab Test 04/22/22  1322 05/10/21  1307 04/12/21  1329   AST 31 36 45   ALT 55* 92* 107*   ALKPHOS 80  --  102     4/17/2025: AST 32, ALT 72   3/21/2024: ALT 79  6/6/2023: AST 38, ALT 88  3/31/2021: ALT 89,   6/8/2020: ALT 82,   6/1/2020: ALT 81,   11/19/2020 ALT 63,   1/13/2019: ALT 22     Vitamin D 24 (4/12/2021)  TSH 3.28 (4/17/2025)     PSYCHOTROPIC DRUG INTERACTIONS:    no     MANAGEMENT:  N/A    Impression/Assessment      Omar Kaplan is a 34 year old adult  who presents for med management follow up.  Pt appears mostly stable in his mood and anxiety, denies SI, SIB or HI during the appointment. Tearful when discussing loss of mother. PHQ 9 mildly and AKIN 7 significantly elevated today. Pt lost mother in 2/2025, was able to spend time with her, but the loss has been very difficult. Expecting May would be difficult as this would be the 1st birthday without her and her memorial is set on 5/24. Feels well supported by partner though was disappointed 2 other people who he counted on for support could not offer support. Pt is also expecting changes at work and also moving. This is excited change. Typically depression worsens in July or August. For now, wants to continue on current medication regimen and reassess in July. Most  recent labs and VS from 4/17/2025 reviewed. OK to continue on current medication regimen.    But discussed Effexor possibly contributing to worsening POTS sxs. Reports Effexor was initially prescribed for migraine prevention. Pt has asthma, so not a good candidate for Propranolol trial if Effexor was tapered down/off in the future. Also Effexor taper may improve LFT. Recommended to discuss possible trial of Metoprolol in the future with cardiology for POTS/migraine prevention while monitoring mood closely. May consider increase in Lamictal if mood worsens with or without Effexor in the future.    Diagnosis                                                                   Bipolar 2 disorder  PTSD  AKIN  ADHD    Treatment Recommendation & Plan       Medication Ordered/Consults/Labs/tests Ordered:     Medication: Continue on current medication regimen. Monitor your mood and sleep closely. If not feeling well, please contact luis.  OTC Recommendations: none  Lab Orders:  none  Referrals:   POTS  -Compression gear (Torso and legs, if possible, also get arm compression gear as  well)  -Discuss possible trial of Metoprolol with cardiologist to help with POTS, migraine prevention. Inform them you have asthma and likely can't try Propranolol.  Release of Information: none  Future Treatment Considerations: Per symptoms.   Return for Follow Up: in 2 months    -Discussed safety plan for suicidal thoughts  -Discussed plan for suicidality  -Discussed available emergency services  -Patient agrees with the treatment plan  -Encouraged to continue outpatient therapy to gain more coping mechanism for stress.    Treatment Risk Statement: Discussed with the patient my impressions, as well as recommended studies. I educated patient on the differential diagnosis and prognosis. I discussed with the patient the risks and benefits of medications versus no interventions, including efficacy, dose, possible side effects and length of treatment and the importance of medication compliance.  The patient understands the risks, benefits, adverse effects and alternatives. Agrees to treatment with the capacity to do so. No medical contraindications to treatment. The patient also understands the risks of using street drugs or alcohol.    CRISIS NUMBERS:   Provided routinely in AVS.    Diagnosis or treatment significantly limited by social determinants of health.      The longitudinal plan of care for the diagnosis(es)/condition(s) as documented were addressed during this visit. Due to the added complexity in care, I will continue to support Miles in the subsequent management and with ongoing continuity of care.        Psychiatry Clinic Individual Psychotherapy Note                                                                     [16]     Start time - 1530      End time - 1609  Date last reviewed - N/A       Date next due - N/A     Subjective: This supportive psychotherapy session addressed issues related to current stressors consisting of grief and loss  Patient's reaction: Preparatory and Action in the context of  mental status appropriate for ambulatory setting.  Progress: good  Plan: RTC in 2 months  Psychotherapy services during this visit included myself and the patient.     Treatment Plan      SYMPTOMS; PROBLEMS   MEASURABLE GOALS;    FUNCTIONAL IMPROVEMENT INTERVENTIONS;   GAINS MADE DISCHARGE CRITERIA   Psychosocial: grief and loss   take steps to improve support network strength focus marked symptom improvement         Dayan Blackmon CNP,  05/14/2025

## 2025-05-14 NOTE — NURSING NOTE
Current patient location: [address] [place] [address]    Is the patient currently in the state of [place]? YES    Visit mode: VIDEO    If the visit is dropped, the patient can be reconnected by:VIDEO VISIT: Text to cell phone:   Telephone Information:   Mobile [phone]       Will anyone else be joining the visit? NO  (If patient encounters technical issues they should call [phone])    Are changes needed to the allergy or medication list? No    Are refills needed on medications prescribed by this physician? Discuss with provider    Rooming Documentation:  Questionnaire(s) completed    Reason for visit: RECHECK    Suzi NAGY

## 2025-05-21 ENCOUNTER — TRANSCRIBE ORDERS (OUTPATIENT)
Dept: OTHER | Age: 35
End: 2025-05-21

## 2025-05-21 DIAGNOSIS — R42 LIGHTHEADEDNESS: Primary | ICD-10-CM

## 2025-05-22 ENCOUNTER — PATIENT OUTREACH (OUTPATIENT)
Dept: CARE COORDINATION | Facility: CLINIC | Age: 35
End: 2025-05-22

## 2025-05-26 ENCOUNTER — PATIENT OUTREACH (OUTPATIENT)
Dept: CARE COORDINATION | Facility: CLINIC | Age: 35
End: 2025-05-26

## 2025-06-14 ENCOUNTER — HEALTH MAINTENANCE LETTER (OUTPATIENT)
Age: 35
End: 2025-06-14